# Patient Record
Sex: MALE | Race: WHITE | Employment: OTHER | URBAN - METROPOLITAN AREA
[De-identification: names, ages, dates, MRNs, and addresses within clinical notes are randomized per-mention and may not be internally consistent; named-entity substitution may affect disease eponyms.]

---

## 2017-02-10 RX ORDER — LORAZEPAM 0.5 MG/1
TABLET ORAL
Qty: 30 TABLET | Refills: 5 | OUTPATIENT
Start: 2017-02-10 | End: 2017-08-09

## 2017-02-10 NOTE — TELEPHONE ENCOUNTER
Noted. Patient has been on this medication for a while. Please call in a refill for this patient's medication as prescribed. It is prescribed as Ativan/lorazepam 0.5 mg  #30  Sig-1 tablet orally twice a day as necessary. 5 refills.   Will route this to

## 2017-03-01 ENCOUNTER — TELEPHONE (OUTPATIENT)
Dept: INTERNAL MEDICINE CLINIC | Facility: CLINIC | Age: 77
End: 2017-03-01

## 2017-03-01 ENCOUNTER — OFFICE VISIT (OUTPATIENT)
Dept: INTERNAL MEDICINE CLINIC | Facility: CLINIC | Age: 77
End: 2017-03-01

## 2017-03-01 VITALS
SYSTOLIC BLOOD PRESSURE: 110 MMHG | TEMPERATURE: 98 F | BODY MASS INDEX: 32.05 KG/M2 | HEART RATE: 64 BPM | DIASTOLIC BLOOD PRESSURE: 60 MMHG | WEIGHT: 201.81 LBS | HEIGHT: 66.5 IN | OXYGEN SATURATION: 96 %

## 2017-03-01 DIAGNOSIS — I10 ESSENTIAL HYPERTENSION: ICD-10-CM

## 2017-03-01 DIAGNOSIS — J40 BRONCHITIS: ICD-10-CM

## 2017-03-01 DIAGNOSIS — J01.90 ACUTE NON-RECURRENT SINUSITIS, UNSPECIFIED LOCATION: Primary | ICD-10-CM

## 2017-03-01 PROCEDURE — G0463 HOSPITAL OUTPT CLINIC VISIT: HCPCS | Performed by: INTERNAL MEDICINE

## 2017-03-01 PROCEDURE — 99213 OFFICE O/P EST LOW 20 MIN: CPT | Performed by: INTERNAL MEDICINE

## 2017-03-01 RX ORDER — AZITHROMYCIN 250 MG/1
TABLET, FILM COATED ORAL
Qty: 6 TABLET | Refills: 1 | Status: SHIPPED | OUTPATIENT
Start: 2017-03-01 | End: 2017-03-06

## 2017-03-01 NOTE — PROGRESS NOTES
Gunnar Solano is a 67year old male. Patient presents with:  Cough: Symptoms first started last Thursday. Cough with difficulty bringing up mucus, headaches, fatigue, felt like he had fevers a couple nights ago.  Started taking tylenol yesterday and OTC d Take 1 tablet (100 mg total) by mouth once daily.  Disp: 90 tablet Rfl: 3   Pantoprazole Sodium (PROTONIX) 40 MG Oral Tab EC Take 1 tablet (40 mg total) by mouth every morning before breakfast. Disp: 90 tablet Rfl: 3   tamsulosin HCl (FLOMAX) 0.4 MG Oral Ca S1S2, without murmur   GI:Protuberant, BS are present, no organomegaly or palpable masses  EXTREMITIES: no edema  NEURO: alert and oriented  ASSESSMENT AND PLAN:   There are no diagnoses linked to this encounter.     Acute non-recurrent sinusitis, unspecifi

## 2017-03-01 NOTE — PATIENT INSTRUCTIONS
1.  Patient is to continue his current diet, medications and activity. 2.  Patient is to push fluids. 3.  Patient to take Zithromax as prescribed. He will be given one refill on the prescription to use as necessary.   4.  Patient can use Robitussin or Mu

## 2017-03-01 NOTE — TELEPHONE ENCOUNTER
Pt stopped in office, not feeling well  Congestion for about a week, seems to be getting worse  No fever  Can RX be called in for pt? Does he need to be seen?   Tasked to Dr Clay Ritchie nurse

## 2017-03-06 ENCOUNTER — TELEPHONE (OUTPATIENT)
Dept: INTERNAL MEDICINE CLINIC | Facility: CLINIC | Age: 77
End: 2017-03-06

## 2017-03-06 NOTE — TELEPHONE ENCOUNTER
Discussed with patient. Patient still has much head congestion and drainage. I have recommended to the patient that it is okay for him to refill his Z-Binh. However he should also  Zyrtec and take 1 tablet daily.   I feel that a lot of his head con

## 2017-03-06 NOTE — TELEPHONE ENCOUNTER
Please advise - called patient who stated he finished a z-jm , still runny nose, very congested , cough , specially at night , sometimes yellowish , denies fever. He wants to know if he should start the second z-jm he has at home - to DR. TATUM

## 2017-03-10 RX ORDER — CELECOXIB 200 MG/1
200 CAPSULE ORAL DAILY PRN
Qty: 90 CAPSULE | Refills: 1 | Status: SHIPPED | OUTPATIENT
Start: 2017-03-10 | End: 2017-08-17

## 2017-03-14 ENCOUNTER — APPOINTMENT (OUTPATIENT)
Dept: LAB | Age: 77
End: 2017-03-14
Attending: INTERNAL MEDICINE
Payer: MEDICARE

## 2017-03-14 DIAGNOSIS — E78.1 HYPERTRIGLYCERIDEMIA: ICD-10-CM

## 2017-03-14 DIAGNOSIS — E11.9 TYPE 2 DIABETES MELLITUS WITHOUT COMPLICATION, WITHOUT LONG-TERM CURRENT USE OF INSULIN (HCC): ICD-10-CM

## 2017-03-14 DIAGNOSIS — E78.00 HYPERCHOLESTEREMIA: ICD-10-CM

## 2017-03-14 LAB
ALT SERPL-CCNC: 27 U/L (ref 17–63)
AST SERPL-CCNC: 25 U/L (ref 15–41)
CHOLEST SERPL-MCNC: 144 MG/DL (ref 110–200)
GLUCOSE SERPL-MCNC: 173 MG/DL (ref 70–99)
HBA1C MFR BLD: 7.5 % (ref 4–6)
HDLC SERPL-MCNC: 23 MG/DL
LDLC SERPL CALC-MCNC: 98 MG/DL (ref 0–99)
NONHDLC SERPL-MCNC: 121 MG/DL
TRIGL SERPL-MCNC: 116 MG/DL (ref 1–149)

## 2017-03-14 PROCEDURE — 83036 HEMOGLOBIN GLYCOSYLATED A1C: CPT

## 2017-03-14 PROCEDURE — 80061 LIPID PANEL: CPT

## 2017-03-14 PROCEDURE — 84450 TRANSFERASE (AST) (SGOT): CPT

## 2017-03-14 PROCEDURE — 84460 ALANINE AMINO (ALT) (SGPT): CPT

## 2017-03-14 PROCEDURE — 82947 ASSAY GLUCOSE BLOOD QUANT: CPT

## 2017-03-14 PROCEDURE — 36415 COLL VENOUS BLD VENIPUNCTURE: CPT

## 2017-03-20 ENCOUNTER — OFFICE VISIT (OUTPATIENT)
Dept: INTERNAL MEDICINE CLINIC | Facility: CLINIC | Age: 77
End: 2017-03-20

## 2017-03-20 VITALS
WEIGHT: 202 LBS | TEMPERATURE: 98 F | HEART RATE: 60 BPM | SYSTOLIC BLOOD PRESSURE: 100 MMHG | HEIGHT: 66.5 IN | DIASTOLIC BLOOD PRESSURE: 70 MMHG | BODY MASS INDEX: 32.08 KG/M2

## 2017-03-20 DIAGNOSIS — K21.9 GASTROESOPHAGEAL REFLUX DISEASE, ESOPHAGITIS PRESENCE NOT SPECIFIED: ICD-10-CM

## 2017-03-20 DIAGNOSIS — I25.10 ASHD (ARTERIOSCLEROTIC HEART DISEASE): Primary | ICD-10-CM

## 2017-03-20 DIAGNOSIS — K58.9 IRRITABLE BOWEL SYNDROME WITHOUT DIARRHEA: ICD-10-CM

## 2017-03-20 DIAGNOSIS — E11.9 TYPE 2 DIABETES MELLITUS WITHOUT COMPLICATION, WITHOUT LONG-TERM CURRENT USE OF INSULIN (HCC): ICD-10-CM

## 2017-03-20 DIAGNOSIS — E78.00 HYPERCHOLESTEREMIA: ICD-10-CM

## 2017-03-20 DIAGNOSIS — G62.9 PERIPHERAL POLYNEUROPATHY: ICD-10-CM

## 2017-03-20 DIAGNOSIS — N40.1 BENIGN NON-NODULAR PROSTATIC HYPERPLASIA WITH LOWER URINARY TRACT SYMPTOMS: ICD-10-CM

## 2017-03-20 DIAGNOSIS — E78.1 HYPERTRIGLYCERIDEMIA: ICD-10-CM

## 2017-03-20 DIAGNOSIS — R97.20 ELEVATED PSA: ICD-10-CM

## 2017-03-20 DIAGNOSIS — R53.83 FATIGUE, UNSPECIFIED TYPE: ICD-10-CM

## 2017-03-20 DIAGNOSIS — I10 ESSENTIAL HYPERTENSION: ICD-10-CM

## 2017-03-20 DIAGNOSIS — M15.9 PRIMARY OSTEOARTHRITIS INVOLVING MULTIPLE JOINTS: ICD-10-CM

## 2017-03-20 PROCEDURE — 99214 OFFICE O/P EST MOD 30 MIN: CPT | Performed by: INTERNAL MEDICINE

## 2017-03-20 PROCEDURE — G0463 HOSPITAL OUTPT CLINIC VISIT: HCPCS | Performed by: INTERNAL MEDICINE

## 2017-03-20 RX ORDER — GLIMEPIRIDE 2 MG/1
2 TABLET ORAL
Qty: 90 TABLET | Refills: 3 | Status: SHIPPED | OUTPATIENT
Start: 2017-03-20 | End: 2018-02-26

## 2017-03-20 NOTE — PATIENT INSTRUCTIONS
1.  Patient is to continue his current diet, medications and activity. 2.  I will start the patient on glimepiride 2 mg orally daily in addition to his metformin to help control his diabetes mellitus.   3.  I will plan to see the patient back in 3 months w

## 2017-03-20 NOTE — PROGRESS NOTES
Orelia Osgood is a 67year old male. Patient presents with:  Checkup: Patient is here today for a 3 month checkup. In general he has been feeling good lately.    Ashd  Hypertension  Diabetes  Hyperlipidemia  Arthritis    HPI:   Patient presents with:  Irais Acharya Disp:  Rfl:    Dutasteride (AVODART) 0.5 MG Oral Cap Take 0.5 mg by mouth daily. Disp:  Rfl:    aspirin (ASPIRIN ADULT LOW STRENGTH) 81 MG Oral Tab EC Take 1 tablet by mouth daily.  Disp:  Rfl:    Psyllium (METAMUCIL) 0.52 G Oral Cap Take 1 packet by mouth current diet, medications and activity. I will see him back in 3 months of blood tests which will include an FBS, hemoglobin A1c, lipid panel, AST and ALT. I will see the patient back sooner as necessary.     2. Essential hypertension  Doing well.  CPM.

## 2017-03-21 RX ORDER — ZOLPIDEM TARTRATE 10 MG/1
TABLET ORAL
Qty: 90 TABLET | Refills: 1 | OUTPATIENT
Start: 2017-03-21 | End: 2017-09-14

## 2017-03-21 NOTE — TELEPHONE ENCOUNTER
Noted.  Chart reviewed. Patient has been on this medication for the past few years. I have renewed the patient's prescription with 1 refill. Please call the prescription into the patient's pharmacy as requested. I will route this to nursing.   Thank you

## 2017-04-01 RX ORDER — PANTOPRAZOLE SODIUM 40 MG/1
TABLET, DELAYED RELEASE ORAL
Qty: 90 TABLET | Refills: 3 | Status: SHIPPED | OUTPATIENT
Start: 2017-04-01 | End: 2017-10-25 | Stop reason: ALTCHOICE

## 2017-04-19 RX ORDER — TAMSULOSIN HYDROCHLORIDE 0.4 MG/1
0.4 CAPSULE ORAL DAILY
Qty: 90 CAPSULE | Refills: 3 | Status: SHIPPED | OUTPATIENT
Start: 2017-04-19 | End: 2018-04-04

## 2017-04-19 RX ORDER — DUTASTERIDE 0.5 MG/1
0.5 CAPSULE, LIQUID FILLED ORAL DAILY
Qty: 90 CAPSULE | Refills: 3 | Status: SHIPPED | OUTPATIENT
Start: 2017-04-19 | End: 2018-04-04

## 2017-05-18 ENCOUNTER — OFFICE VISIT (OUTPATIENT)
Dept: OPHTHALMOLOGY | Facility: CLINIC | Age: 77
End: 2017-05-18

## 2017-05-18 DIAGNOSIS — H43.392 FLOATER, VITREOUS, LEFT: ICD-10-CM

## 2017-05-18 DIAGNOSIS — H25.13 AGE-RELATED NUCLEAR CATARACT OF BOTH EYES: ICD-10-CM

## 2017-05-18 DIAGNOSIS — E11.9 DIABETES MELLITUS TYPE 2 WITHOUT RETINOPATHY (HCC): Primary | ICD-10-CM

## 2017-05-18 PROBLEM — H43.399 FLOATER, VITREOUS: Status: ACTIVE | Noted: 2017-05-18

## 2017-05-18 PROCEDURE — 92004 COMPRE OPH EXAM NEW PT 1/>: CPT | Performed by: OPHTHALMOLOGY

## 2017-05-18 NOTE — PATIENT INSTRUCTIONS
Diabetes mellitus type 2 without retinopathy ([facility] [address]  Diabetes type II: no background of retinopathy, no signs of neovascularization noted. Discussed ocular and systemic benefits of blood sugar control.   Diagnosis and treatment discussed in detail with clive

## 2017-05-18 NOTE — ASSESSMENT & PLAN NOTE
Discussed diagnosis of cataracts in detail with patient. Cataract surgery not indicated at this time due to vision level. Will continue to monitor yearly. Patient will call sooner than 1 year recall if they notice a change in vision.

## 2017-05-18 NOTE — PROGRESS NOTES
Fausto Peyton is a 67year old male.     HPI:     HPI     Diabetic Eye Exam    Additional comments: Pt has been a diabetic for 12 years  12 years on pills/  0 years on Insulin   Pt does not check his BS at home  Last HA1C was 7.5 on 3/14/17           Com Rfl: 3   ZOLPIDEM TARTRATE 10 MG Oral Tab TAKE ONE TABLET BY MOUTH AT BEDTIME AS NEEDED Disp: 90 tablet Rfl: 1   glimepiride 2 MG Oral Tab Take 1 tablet (2 mg total) by mouth daily with breakfast. Disp: 90 tablet Rfl: 3   celecoxib 200 MG Oral Cap Take 1 c Dermatochalasis, Meibomian gland dysfunction Dermatochalasis, Meibomian gland dysfunction    Conjunctiva/Sclera Normal Normal    Cornea Clear Clear    Anterior Chamber Deep and quiet Deep and quiet    Iris Normal Normal    Lens 2+ Nuclear sclerosis 2+ Nucl (3) no apparent problem

## 2017-06-12 ENCOUNTER — PRIOR ORIGINAL RECORDS (OUTPATIENT)
Dept: OTHER | Age: 77
End: 2017-06-12

## 2017-06-12 ENCOUNTER — APPOINTMENT (OUTPATIENT)
Dept: LAB | Age: 77
End: 2017-06-12
Attending: INTERNAL MEDICINE
Payer: MEDICARE

## 2017-06-12 DIAGNOSIS — E78.00 HYPERCHOLESTEREMIA: ICD-10-CM

## 2017-06-12 DIAGNOSIS — E11.9 TYPE 2 DIABETES MELLITUS WITHOUT COMPLICATION, WITHOUT LONG-TERM CURRENT USE OF INSULIN (HCC): ICD-10-CM

## 2017-06-12 DIAGNOSIS — E78.1 HYPERTRIGLYCERIDEMIA: ICD-10-CM

## 2017-06-12 PROCEDURE — 83036 HEMOGLOBIN GLYCOSYLATED A1C: CPT

## 2017-06-12 PROCEDURE — 80061 LIPID PANEL: CPT

## 2017-06-12 PROCEDURE — 82947 ASSAY GLUCOSE BLOOD QUANT: CPT

## 2017-06-12 PROCEDURE — 36415 COLL VENOUS BLD VENIPUNCTURE: CPT

## 2017-06-12 PROCEDURE — 84460 ALANINE AMINO (ALT) (SGPT): CPT

## 2017-06-12 PROCEDURE — 84450 TRANSFERASE (AST) (SGOT): CPT

## 2017-06-20 LAB
ALT (SGPT): 28 U/L
AST (SGOT): 27 U/L
CHOLESTEROL, TOTAL: 132 MG/DL
GLUCOSE: 104 MG/DL
HDL CHOLESTEROL: 26 MG/DL
LDL CHOLESTEROL: 91 MG/DL
TRIGLYCERIDES: 73 MG/DL

## 2017-06-21 ENCOUNTER — PRIOR ORIGINAL RECORDS (OUTPATIENT)
Dept: OTHER | Age: 77
End: 2017-06-21

## 2017-06-22 ENCOUNTER — OFFICE VISIT (OUTPATIENT)
Dept: INTERNAL MEDICINE CLINIC | Facility: CLINIC | Age: 77
End: 2017-06-22

## 2017-06-22 VITALS
TEMPERATURE: 98 F | DIASTOLIC BLOOD PRESSURE: 70 MMHG | BODY MASS INDEX: 32.24 KG/M2 | WEIGHT: 203 LBS | SYSTOLIC BLOOD PRESSURE: 116 MMHG | HEART RATE: 64 BPM | HEIGHT: 66.5 IN

## 2017-06-22 DIAGNOSIS — I25.10 ASHD (ARTERIOSCLEROTIC HEART DISEASE): Primary | ICD-10-CM

## 2017-06-22 DIAGNOSIS — I10 ESSENTIAL HYPERTENSION: ICD-10-CM

## 2017-06-22 DIAGNOSIS — R53.83 FATIGUE, UNSPECIFIED TYPE: ICD-10-CM

## 2017-06-22 DIAGNOSIS — M15.9 PRIMARY OSTEOARTHRITIS INVOLVING MULTIPLE JOINTS: ICD-10-CM

## 2017-06-22 DIAGNOSIS — Z00.00 ANNUAL PHYSICAL EXAM: ICD-10-CM

## 2017-06-22 DIAGNOSIS — E78.1 HYPERTRIGLYCERIDEMIA: ICD-10-CM

## 2017-06-22 DIAGNOSIS — E11.9 TYPE 2 DIABETES MELLITUS WITHOUT COMPLICATION, WITHOUT LONG-TERM CURRENT USE OF INSULIN (HCC): ICD-10-CM

## 2017-06-22 DIAGNOSIS — N40.1 BENIGN NON-NODULAR PROSTATIC HYPERPLASIA WITH LOWER URINARY TRACT SYMPTOMS: ICD-10-CM

## 2017-06-22 DIAGNOSIS — G62.9 PERIPHERAL POLYNEUROPATHY: ICD-10-CM

## 2017-06-22 DIAGNOSIS — K21.9 GASTROESOPHAGEAL REFLUX DISEASE, ESOPHAGITIS PRESENCE NOT SPECIFIED: ICD-10-CM

## 2017-06-22 DIAGNOSIS — L98.9 SKIN LESION: ICD-10-CM

## 2017-06-22 DIAGNOSIS — K58.9 IRRITABLE BOWEL SYNDROME WITHOUT DIARRHEA: ICD-10-CM

## 2017-06-22 DIAGNOSIS — R97.20 ELEVATED PSA: ICD-10-CM

## 2017-06-22 DIAGNOSIS — E78.00 HYPERCHOLESTEREMIA: ICD-10-CM

## 2017-06-22 PROCEDURE — 90471 IMMUNIZATION ADMIN: CPT | Performed by: INTERNAL MEDICINE

## 2017-06-22 PROCEDURE — 99214 OFFICE O/P EST MOD 30 MIN: CPT | Performed by: INTERNAL MEDICINE

## 2017-06-22 PROCEDURE — G0463 HOSPITAL OUTPT CLINIC VISIT: HCPCS | Performed by: INTERNAL MEDICINE

## 2017-06-22 PROCEDURE — 90715 TDAP VACCINE 7 YRS/> IM: CPT | Performed by: INTERNAL MEDICINE

## 2017-06-22 NOTE — PROGRESS NOTES
Ricka Collet is a 67year old male. Patient presents with:  Checkup: 3 months  Ashd  Hypertension  Diabetes  Hyperlipidemia  Arthritis    HPI:   Patient presents with:  Checkup: 3 months  Ashd  Hypertension  Diabetes  Hyperlipidemia  Arthritis    Pt fee Tablet 24 Hr TAKE 1 TABLET DAILY Disp: 90 tablet Rfl: 3   AMLODIPINE BESYLATE 10 MG Oral Tab TAKE 1 TABLET DAILY Disp: 90 tablet Rfl: 3   SPIRONOLACTONE 25 MG Oral Tab TAKE 1 TABLET DAILY Disp: 90 tablet Rfl: 3   Losartan Potassium 100 MG Oral Tab Take 1 t Patient has some small sores over his lower legs which appear clean but which have not healed. NEURO: alert and oriented  ASSESSMENT AND PLAN:   1. ASHD (arteriosclerotic heart disease)  Doing well.  CPM.  Patient to continue to be active.   He is going to the plan. The patient is asked to return in 3 months with blood tests and urinalysis for his annual physical examination. Vicky Linder MD  6/22/2017  10:22 AM

## 2017-06-22 NOTE — PATIENT INSTRUCTIONS
1.  Patient is to continue his current diet, medications and activity. 2.  I will plan to see the patient back in about 3 months with blood tests, urinalysis and EKG for his annual physical examination. .  I will see the patient back sooner as necessary.

## 2017-06-26 RX ORDER — LOSARTAN POTASSIUM 100 MG/1
TABLET ORAL
Qty: 90 TABLET | Refills: 3 | Status: SHIPPED | OUTPATIENT
Start: 2017-06-26 | End: 2017-11-20

## 2017-07-05 ENCOUNTER — TELEPHONE (OUTPATIENT)
Dept: INTERNAL MEDICINE CLINIC | Facility: CLINIC | Age: 77
End: 2017-07-05

## 2017-07-05 NOTE — TELEPHONE ENCOUNTER
PA needed for RX Celecoxib  Per [facility] PA is about to   Ph [phone]  No ID given  To Rx and in purple

## 2017-08-10 RX ORDER — LORAZEPAM 0.5 MG/1
TABLET ORAL
Qty: 30 TABLET | Refills: 5 | OUTPATIENT
Start: 2017-08-10 | End: 2017-11-20 | Stop reason: CLARIF

## 2017-08-10 NOTE — TELEPHONE ENCOUNTER
Noted.  I have approved the medication refill as requested. I have also given the patient 5 additional refills. Please call in this prescription and refills to the patient's pharmacy as requested. Also notify the patient this is been done.   I will route

## 2017-08-18 RX ORDER — CELECOXIB 200 MG/1
CAPSULE ORAL
Qty: 90 CAPSULE | Refills: 3 | Status: SHIPPED | OUTPATIENT
Start: 2017-08-18 | End: 2017-11-20 | Stop reason: DRUGHIGH

## 2017-09-05 ENCOUNTER — TELEPHONE (OUTPATIENT)
Dept: INTERNAL MEDICINE CLINIC | Facility: CLINIC | Age: 77
End: 2017-09-05

## 2017-09-05 ENCOUNTER — HOSPITAL ENCOUNTER (OUTPATIENT)
Dept: ULTRASOUND IMAGING | Facility: HOSPITAL | Age: 77
Discharge: HOME | End: 2017-09-05
Attending: INTERNAL MEDICINE | Admitting: INTERNAL MEDICINE
Payer: MEDICARE

## 2017-09-05 ENCOUNTER — OFFICE VISIT (OUTPATIENT)
Dept: INTERNAL MEDICINE CLINIC | Facility: CLINIC | Age: 77
End: 2017-09-05

## 2017-09-05 VITALS
DIASTOLIC BLOOD PRESSURE: 58 MMHG | TEMPERATURE: 98 F | OXYGEN SATURATION: 98 % | HEART RATE: 63 BPM | WEIGHT: 206.38 LBS | BODY MASS INDEX: 32.78 KG/M2 | SYSTOLIC BLOOD PRESSURE: 120 MMHG | HEIGHT: 66.5 IN

## 2017-09-05 DIAGNOSIS — M79.662 PAIN AND SWELLING OF LEFT LOWER LEG: Primary | ICD-10-CM

## 2017-09-05 DIAGNOSIS — I25.10 ASHD (ARTERIOSCLEROTIC HEART DISEASE): ICD-10-CM

## 2017-09-05 DIAGNOSIS — M79.89 PAIN AND SWELLING OF LEFT LOWER LEG: ICD-10-CM

## 2017-09-05 DIAGNOSIS — E11.9 TYPE 2 DIABETES MELLITUS WITHOUT COMPLICATION, WITHOUT LONG-TERM CURRENT USE OF INSULIN (HCC): ICD-10-CM

## 2017-09-05 DIAGNOSIS — I73.9 CLAUDICATION (HCC): Primary | ICD-10-CM

## 2017-09-05 DIAGNOSIS — I10 ESSENTIAL HYPERTENSION: ICD-10-CM

## 2017-09-05 DIAGNOSIS — M79.89 PAIN AND SWELLING OF LEFT LOWER LEG: Primary | ICD-10-CM

## 2017-09-05 DIAGNOSIS — M79.662 PAIN AND SWELLING OF LEFT LOWER LEG: ICD-10-CM

## 2017-09-05 PROCEDURE — G0463 HOSPITAL OUTPT CLINIC VISIT: HCPCS | Performed by: INTERNAL MEDICINE

## 2017-09-05 PROCEDURE — 93971 EXTREMITY STUDY: CPT | Performed by: INTERNAL MEDICINE

## 2017-09-05 PROCEDURE — 99214 OFFICE O/P EST MOD 30 MIN: CPT | Performed by: INTERNAL MEDICINE

## 2017-09-05 NOTE — PROGRESS NOTES
Sravan Magaña is a 65year old male   HPI:   Pt.presents for the following problems. For about 6 weeks patient has been having a painful left calf. No pain at rest but patient takes a few steps and he has sharp shooting pains left calf area.   He is TABLET EVERY MORNINGBEFORE BREAKFAST Disp: 90 tablet Rfl: 3   ZOLPIDEM TARTRATE 10 MG Oral Tab TAKE ONE TABLET BY MOUTH AT BEDTIME AS NEEDED Disp: 90 tablet Rfl: 1   glimepiride 2 MG Oral Tab Take 1 tablet (2 mg total) by mouth daily with breakfast. Disp: breath or cough  CARDIOVASCULAR :  denies chest pain or palpitations  GI:  denies abdominal pain, blood in stool or changes in bowel movements.   : denies blood in urine or changes in stream  MUSCULOSKELETAL: Patient does not have any acute knee pain or a dorsal pedal pulse. - US VENOUS DOPPLER LEG LEFT - DIAG IMG ([id number]); Future    2. Essential hypertension  Blood pressure under adequate control.     3. Type 2 diabetes mellitus without complication, without long-term current use of insulin ([facility] [address]  Azul

## 2017-09-05 NOTE — TELEPHONE ENCOUNTER
Please let patient know that his venous Doppler did not show any blood clot. He already got this information from ultrasound. I did place an order for his artery test that he can call and schedule.   This will make sure that his circulation is adequate go

## 2017-09-05 NOTE — TELEPHONE ENCOUNTER
Relayed MD message to patient. Phone number given for [employer]. Patient would like to know If he should continue to exersize or hold off.

## 2017-09-05 NOTE — TELEPHONE ENCOUNTER
Please notify patient I would hold off for now not knowing the exact reason for the pain. I am hoping that we will not find any narrowing of his arteries since that usually gives more of the ache in the calf rather than the sharp pain he is experiencing.

## 2017-09-05 NOTE — TELEPHONE ENCOUNTER
Called patient , spoke with spouse and relayed DR. CELIS message - she verbalized understanding and will relay message to her spouse

## 2017-09-08 ENCOUNTER — TELEPHONE (OUTPATIENT)
Dept: INTERNAL MEDICINE CLINIC | Facility: CLINIC | Age: 77
End: 2017-09-08

## 2017-09-08 ENCOUNTER — HOSPITAL ENCOUNTER (OUTPATIENT)
Dept: ULTRASOUND IMAGING | Facility: HOSPITAL | Age: 77
Discharge: HOME | End: 2017-09-08
Attending: INTERNAL MEDICINE
Payer: MEDICARE

## 2017-09-08 DIAGNOSIS — I73.9 CLAUDICATION (HCC): ICD-10-CM

## 2017-09-08 PROCEDURE — 93924 LWR XTR VASC STDY BILAT: CPT | Performed by: INTERNAL MEDICINE

## 2017-09-08 NOTE — TELEPHONE ENCOUNTER
Please notify patient that his peripheral arterial study is good. No evidence of peripheral arterial disease or circulation problems either in the veins or arteries. He can follow-up with Dr. Harpal Salazar.   Not quite sure of the pain he has in his calf with

## 2017-09-14 NOTE — TELEPHONE ENCOUNTER
Dr. Adam Boswell message relayed to pt who verbalized understanding. Pt states pain is getting better, every day better and better.   To Dr. Angelica Hart, [facility]

## 2017-09-15 RX ORDER — ZOLPIDEM TARTRATE 10 MG/1
TABLET ORAL
Qty: 90 TABLET | Refills: 1 | Status: SHIPPED
Start: 2017-09-15 | End: 2017-11-20 | Stop reason: CLARIF

## 2017-09-15 NOTE — TELEPHONE ENCOUNTER
To  (on call) to please advise in 's absence: The above refill request is for a controlled substance. Please indicate yes or no to refill 30 days supply plus one refill.   If more refills are appropriate, please indicate quantity

## 2017-09-19 ENCOUNTER — LAB ENCOUNTER (OUTPATIENT)
Dept: LAB | Age: 77
End: 2017-09-19
Attending: INTERNAL MEDICINE
Payer: MEDICARE

## 2017-09-19 ENCOUNTER — TELEPHONE (OUTPATIENT)
Dept: INTERNAL MEDICINE CLINIC | Facility: CLINIC | Age: 77
End: 2017-09-19

## 2017-09-19 ENCOUNTER — PRIOR ORIGINAL RECORDS (OUTPATIENT)
Dept: OTHER | Age: 77
End: 2017-09-19

## 2017-09-19 DIAGNOSIS — R53.83 FATIGUE, UNSPECIFIED TYPE: ICD-10-CM

## 2017-09-19 DIAGNOSIS — R97.20 ELEVATED PSA: ICD-10-CM

## 2017-09-19 DIAGNOSIS — E11.9 TYPE 2 DIABETES MELLITUS WITHOUT COMPLICATION, WITHOUT LONG-TERM CURRENT USE OF INSULIN (HCC): ICD-10-CM

## 2017-09-19 DIAGNOSIS — E78.00 HYPERCHOLESTEREMIA: ICD-10-CM

## 2017-09-19 DIAGNOSIS — Z00.00 ANNUAL PHYSICAL EXAM: ICD-10-CM

## 2017-09-19 LAB
ALBUMIN SERPL BCP-MCNC: 4.5 G/DL (ref 3.5–4.8)
ALBUMIN/GLOB SERPL: 1.8 {RATIO} (ref 1–2)
ALP SERPL-CCNC: 42 U/L (ref 32–100)
ALT SERPL-CCNC: 31 U/L (ref 17–63)
ANION GAP SERPL CALC-SCNC: 8 MMOL/L (ref 0–18)
AST SERPL-CCNC: 29 U/L (ref 15–41)
BACTERIA UR QL AUTO: NEGATIVE /HPF
BASOPHILS # BLD: 0 K/UL (ref 0–0.2)
BASOPHILS NFR BLD: 0 %
BILIRUB SERPL-MCNC: 0.9 MG/DL (ref 0.3–1.2)
BILIRUB UR QL: NEGATIVE
BUN SERPL-MCNC: 17 MG/DL (ref 8–20)
BUN/CREAT SERPL: 14.3 (ref 10–20)
CALCIUM SERPL-MCNC: 9.5 MG/DL (ref 8.5–10.5)
CHLORIDE SERPL-SCNC: 98 MMOL/L (ref 95–110)
CHOLEST SERPL-MCNC: 132 MG/DL (ref 110–200)
CLARITY UR: CLEAR
CO2 SERPL-SCNC: 25 MMOL/L (ref 22–32)
COLOR UR: YELLOW
CREAT SERPL-MCNC: 1.19 MG/DL (ref 0.5–1.5)
EOSINOPHIL # BLD: 0.2 K/UL (ref 0–0.7)
EOSINOPHIL NFR BLD: 2 %
ERYTHROCYTE [DISTWIDTH] IN BLOOD BY AUTOMATED COUNT: 13.3 % (ref 11–15)
GLOBULIN PLAS-MCNC: 2.5 G/DL (ref 2.5–3.7)
GLUCOSE SERPL-MCNC: 125 MG/DL (ref 70–99)
GLUCOSE UR-MCNC: NEGATIVE MG/DL
HBA1C MFR BLD: 6.4 % (ref 4–6)
HCT VFR BLD AUTO: 44.4 % (ref 41–52)
HDLC SERPL-MCNC: 29 MG/DL
HGB BLD-MCNC: 15.1 G/DL (ref 13.5–17.5)
HGB UR QL STRIP.AUTO: NEGATIVE
KETONES UR-MCNC: NEGATIVE MG/DL
LDLC SERPL CALC-MCNC: 83 MG/DL (ref 0–99)
LEUKOCYTE ESTERASE UR QL STRIP.AUTO: NEGATIVE
LYMPHOCYTES # BLD: 1.9 K/UL (ref 1–4)
LYMPHOCYTES NFR BLD: 25 %
MCH RBC QN AUTO: 31.1 PG (ref 27–32)
MCHC RBC AUTO-ENTMCNC: 34 G/DL (ref 32–37)
MCV RBC AUTO: 91.6 FL (ref 80–100)
MONOCYTES # BLD: 0.6 K/UL (ref 0–1)
MONOCYTES NFR BLD: 8 %
NEUTROPHILS # BLD AUTO: 5.1 K/UL (ref 1.8–7.7)
NEUTROPHILS NFR BLD: 65 %
NITRITE UR QL STRIP.AUTO: NEGATIVE
NONHDLC SERPL-MCNC: 103 MG/DL
OSMOLALITY UR CALC.SUM OF ELEC: 275 MOSM/KG (ref 275–295)
PH UR: 7 [PH] (ref 5–8)
PLATELET # BLD AUTO: 174 K/UL (ref 140–400)
PMV BLD AUTO: 9.6 FL (ref 7.4–10.3)
POTASSIUM SERPL-SCNC: 5.2 MMOL/L (ref 3.3–5.1)
PROT SERPL-MCNC: 7 G/DL (ref 5.9–8.4)
PROT UR-MCNC: NEGATIVE MG/DL
PSA SERPL-MCNC: 4 NG/ML (ref 0–4)
RBC # BLD AUTO: 4.85 M/UL (ref 4.5–5.9)
RBC #/AREA URNS AUTO: 0 /HPF
SODIUM SERPL-SCNC: 131 MMOL/L (ref 136–144)
SP GR UR STRIP: 1.01 (ref 1–1.03)
TRIGL SERPL-MCNC: 101 MG/DL (ref 1–149)
TSH SERPL-ACNC: 1.56 UIU/ML (ref 0.45–5.33)
UROBILINOGEN UR STRIP-ACNC: <2
VIT C UR-MCNC: 20 MG/DL
WBC # BLD AUTO: 7.8 K/UL (ref 4–11)
WBC #/AREA URNS AUTO: <1 /HPF

## 2017-09-19 PROCEDURE — 83036 HEMOGLOBIN GLYCOSYLATED A1C: CPT

## 2017-09-19 PROCEDURE — 84443 ASSAY THYROID STIM HORMONE: CPT

## 2017-09-19 PROCEDURE — 81003 URINALYSIS AUTO W/O SCOPE: CPT

## 2017-09-19 PROCEDURE — 80053 COMPREHEN METABOLIC PANEL: CPT

## 2017-09-19 PROCEDURE — 85025 COMPLETE CBC W/AUTO DIFF WBC: CPT

## 2017-09-19 PROCEDURE — 80061 LIPID PANEL: CPT

## 2017-09-19 PROCEDURE — 36415 COLL VENOUS BLD VENIPUNCTURE: CPT

## 2017-09-19 NOTE — TELEPHONE ENCOUNTER
I reviewed 9/19/17 labs–CBC CMP TSH lipid PSA A1c UA–results can wait for Dr. Edilma Marcano. This message routed to Dr. France Files.

## 2017-09-27 NOTE — TELEPHONE ENCOUNTER
Pt has OV tomorrow and last K+ level was 5.2 - to DR. TATUM to review/change at [address] [facility]

## 2017-09-28 ENCOUNTER — OFFICE VISIT (OUTPATIENT)
Dept: INTERNAL MEDICINE CLINIC | Facility: CLINIC | Age: 77
End: 2017-09-28

## 2017-09-28 VITALS
BODY MASS INDEX: 33.03 KG/M2 | TEMPERATURE: 98 F | OXYGEN SATURATION: 96 % | WEIGHT: 208 LBS | HEART RATE: 56 BPM | DIASTOLIC BLOOD PRESSURE: 70 MMHG | HEIGHT: 66.5 IN | SYSTOLIC BLOOD PRESSURE: 124 MMHG

## 2017-09-28 DIAGNOSIS — K21.9 GASTROESOPHAGEAL REFLUX DISEASE, ESOPHAGITIS PRESENCE NOT SPECIFIED: ICD-10-CM

## 2017-09-28 DIAGNOSIS — M15.9 PRIMARY OSTEOARTHRITIS INVOLVING MULTIPLE JOINTS: ICD-10-CM

## 2017-09-28 DIAGNOSIS — I10 ESSENTIAL HYPERTENSION: ICD-10-CM

## 2017-09-28 DIAGNOSIS — G62.9 PERIPHERAL POLYNEUROPATHY: ICD-10-CM

## 2017-09-28 DIAGNOSIS — I25.10 ASHD (ARTERIOSCLEROTIC HEART DISEASE): ICD-10-CM

## 2017-09-28 DIAGNOSIS — E78.00 HYPERCHOLESTEREMIA: ICD-10-CM

## 2017-09-28 DIAGNOSIS — Z00.00 ANNUAL PHYSICAL EXAM: Primary | ICD-10-CM

## 2017-09-28 DIAGNOSIS — K58.9 IRRITABLE BOWEL SYNDROME WITHOUT DIARRHEA: ICD-10-CM

## 2017-09-28 DIAGNOSIS — R97.20 ELEVATED PSA: ICD-10-CM

## 2017-09-28 DIAGNOSIS — E78.1 HYPERTRIGLYCERIDEMIA: ICD-10-CM

## 2017-09-28 DIAGNOSIS — R53.83 FATIGUE, UNSPECIFIED TYPE: ICD-10-CM

## 2017-09-28 DIAGNOSIS — N40.1 BENIGN NON-NODULAR PROSTATIC HYPERPLASIA WITH LOWER URINARY TRACT SYMPTOMS: ICD-10-CM

## 2017-09-28 DIAGNOSIS — R94.31 ABNORMAL EKG: ICD-10-CM

## 2017-09-28 DIAGNOSIS — I34.0 MITRAL VALVE INSUFFICIENCY, UNSPECIFIED ETIOLOGY: ICD-10-CM

## 2017-09-28 DIAGNOSIS — E11.9 TYPE 2 DIABETES MELLITUS WITHOUT COMPLICATION, WITHOUT LONG-TERM CURRENT USE OF INSULIN (HCC): ICD-10-CM

## 2017-09-28 DIAGNOSIS — L72.9 CYST OF SKIN: ICD-10-CM

## 2017-09-28 LAB
OCCULT BLOOD, STOOL 1: NEGATIVE
PERFORMANCE MONITORS CORRECT (YES/NO): YES YES/NO

## 2017-09-28 PROCEDURE — 93005 ELECTROCARDIOGRAM TRACING: CPT | Performed by: INTERNAL MEDICINE

## 2017-09-28 PROCEDURE — 99214 OFFICE O/P EST MOD 30 MIN: CPT | Performed by: INTERNAL MEDICINE

## 2017-09-28 PROCEDURE — 82272 OCCULT BLD FECES 1-3 TESTS: CPT | Performed by: INTERNAL MEDICINE

## 2017-09-28 PROCEDURE — G0439 PPPS, SUBSEQ VISIT: HCPCS | Performed by: INTERNAL MEDICINE

## 2017-09-28 PROCEDURE — 93010 ELECTROCARDIOGRAM REPORT: CPT | Performed by: INTERNAL MEDICINE

## 2017-09-28 NOTE — PATIENT INSTRUCTIONS
1.  Patient's continue his current diet, medications and activity. 2.  I will give the patient order to have a 2D echocardiogram to evaluate his heart murmur. 3.  Patient to see Dr. Hayden Palacio concerning the skin lesion on his right lower leg.   4.  Azul

## 2017-09-29 NOTE — PROGRESS NOTES
Loan Gonsalez is a 65year old male who presents for a complete physical exam.   HPI:   Mr. Aubrey Wilkinson is a 68-MGMM-HDV white male who was seen by me on September 28, 2017 for his Medicare annual physical examination.   At the time of the examination kg)  12/12/16 : 200 lb (90.7 kg)    Body mass index is 33.07 kg/m².        Current Outpatient Prescriptions:  ZOLPIDEM TARTRATE 10 MG Oral Tab TAKE ONE TABLET BY MOUTH AT BEDTIME AS NEEDED Disp: 90 tablet Rfl: 1   METFORMIN HCL 1000 MG Oral Tab TAKE 1 TABLE uncontrolled    • Unspecified essential hypertension 1988   • Vitreous floaters of both eyes 2014      Past Surgical History:  2011: ANGIOPLASTY (CORONARY)      Comment: + stent(s)   Family History   Problem Relation Age of Onset   • Heart Disease Father groin region. The area is not painful or red. RECTAL:  Stool is brown and is negative for Occult blood. Prostate is normal with no palpable nodules  MUSCULOSKELETAL: back is not tender. No pain or swelling of legs  EXTREMITIES:No edema.   All peripheral current medications. He is to continue his current diet and activity also. 4. Type 2 diabetes mellitus without complication, without long-term current use of insulin ([facility] [address]  Patient's diabetes mellitus is doing fairly well.   His recent FBS was noted to patient obtain a 2D echocardiogram.  I will plan to the patient follow-up with me in 1 month. - CARD ECHO 2D DOPPLER (CPT=93306); Future    16. Cyst of skin  Patient is a cyst just lateral to the scrotum in the right groin region.   It appears to be a cy 0-No    Do you have any difficulty walking or getting up?: 0-No    Do you have any tripping hazards?: 0-No    Are you on multiple medications?: 1-Yes    Does pain affect your day to day activities?: 0-No     Have you had any memory issues?: 0-No    Fall/Ri Value   09/19/2017 83   08/01/2016 109 (H)        EKG One Time Pt had his EKG today. Colorectal Cancer Screening      Colonoscopy Screen every 10 years There are no preventive care reminders to display for this patient.  Update Health Maintenance if appl No flowsheet data found. Diabetes      HgbA1C  Annually GLYCOHEMOGLOBIN (HgA1c) (L) (%)   Date Value   08/01/2016 7.7 (H)     Glycohemoglobin (HgA1c) (%)   Date Value   09/19/2017 6.4 (H)    No flowsheet data found.     Creat/alb ratio  Annually      LDL

## 2017-09-30 RX ORDER — SPIRONOLACTONE 25 MG/1
TABLET ORAL
Qty: 90 TABLET | Refills: 3 | Status: SHIPPED | OUTPATIENT
Start: 2017-09-30 | End: 2017-11-20 | Stop reason: CLARIF

## 2017-10-01 NOTE — TELEPHONE ENCOUNTER
NOted.  I have reviewed pt's chart, lab tests and meds. I have renewed pt's med as requested.   Thank you!!

## 2017-10-09 RX ORDER — AMLODIPINE BESYLATE 10 MG/1
TABLET ORAL
Qty: 90 TABLET | Refills: 3 | Status: SHIPPED | OUTPATIENT
Start: 2017-10-09 | End: 2017-11-20 | Stop reason: CLARIF

## 2017-10-15 RX ORDER — METOPROLOL SUCCINATE 100 MG/1
TABLET, EXTENDED RELEASE ORAL
Qty: 90 TABLET | Refills: 3 | Status: SHIPPED | OUTPATIENT
Start: 2017-10-15 | End: 2017-11-20

## 2017-10-17 ENCOUNTER — HOSPITAL ENCOUNTER (OUTPATIENT)
Dept: CV DIAGNOSTICS | Facility: HOSPITAL | Age: 77
Discharge: HOME | End: 2017-10-17
Attending: INTERNAL MEDICINE
Payer: MEDICARE

## 2017-10-17 DIAGNOSIS — I34.0 MITRAL VALVE INSUFFICIENCY, UNSPECIFIED ETIOLOGY: ICD-10-CM

## 2017-10-17 PROCEDURE — 93306 TTE W/DOPPLER COMPLETE: CPT | Performed by: INTERNAL MEDICINE

## 2017-10-25 ENCOUNTER — OFFICE VISIT (OUTPATIENT)
Dept: INTERNAL MEDICINE CLINIC | Facility: CLINIC | Age: 77
End: 2017-10-25

## 2017-10-25 VITALS
HEART RATE: 60 BPM | OXYGEN SATURATION: 97 % | WEIGHT: 206 LBS | SYSTOLIC BLOOD PRESSURE: 100 MMHG | DIASTOLIC BLOOD PRESSURE: 60 MMHG | TEMPERATURE: 98 F | BODY MASS INDEX: 32.72 KG/M2 | HEIGHT: 66.5 IN

## 2017-10-25 DIAGNOSIS — I10 ESSENTIAL HYPERTENSION: ICD-10-CM

## 2017-10-25 DIAGNOSIS — I25.10 ASHD (ARTERIOSCLEROTIC HEART DISEASE): Primary | ICD-10-CM

## 2017-10-25 DIAGNOSIS — E11.9 TYPE 2 DIABETES MELLITUS WITHOUT COMPLICATION, WITHOUT LONG-TERM CURRENT USE OF INSULIN (HCC): ICD-10-CM

## 2017-10-25 DIAGNOSIS — E78.00 HYPERCHOLESTEROLEMIA: ICD-10-CM

## 2017-10-25 DIAGNOSIS — E78.1 HYPERTRIGLYCERIDEMIA: ICD-10-CM

## 2017-10-25 DIAGNOSIS — I34.0 MITRAL VALVE INSUFFICIENCY, UNSPECIFIED ETIOLOGY: ICD-10-CM

## 2017-10-25 PROCEDURE — G0463 HOSPITAL OUTPT CLINIC VISIT: HCPCS | Performed by: INTERNAL MEDICINE

## 2017-10-25 PROCEDURE — 99214 OFFICE O/P EST MOD 30 MIN: CPT | Performed by: INTERNAL MEDICINE

## 2017-10-25 RX ORDER — NICOTINE POLACRILEX 4 MG/1
20 GUM, CHEWING ORAL
Qty: 90 TABLET | Refills: 3 | Status: ON HOLD | OUTPATIENT
Start: 2017-10-25 | End: 2017-11-21 | Stop reason: CLARIF

## 2017-10-25 NOTE — PROGRESS NOTES
Dai Ware is a 65year old male. Patient presents with:  Checkup: 1 mo f/u  Ashd  Hypertension  Heart Murmur    HPI:   Patient presents with:  Checkup: 1 mo f/u  Ashd  Hypertension  Heart Murmur    Patient feels well.   Patient notes that he still get mouth daily.  Disp:  Rfl:       Past Medical History:   Diagnosis Date   • Allergic rhinitis    • Cataract of both eyes 2014   • Cholelithiasis    • Depression    • Elevated PSA    • Hemorrhoids    • High triglycerides    • IBS (irritable bowel syndrome) unspecified etiology  Patient had a 2D echocardiogram which is shown him to have moderate mitral insufficiency. Patient has been complaining of fatigue and shortness of breath. He has had no complaints of chest pain.   He notes the fatigue is caused him t

## 2017-10-25 NOTE — PATIENT INSTRUCTIONS
1.  Patient is to continue his current diet, medications and activity. 2.  I will refer the patient see Dr. Viry Parkinson, patient's cardiologist, for evaluation of his mitral insufficiency.   3.  I will plan to see the patient back in 3 months with blood evelyn

## 2017-11-07 ENCOUNTER — PRIOR ORIGINAL RECORDS (OUTPATIENT)
Dept: OTHER | Age: 77
End: 2017-11-07

## 2017-11-09 ENCOUNTER — LAB ENCOUNTER (OUTPATIENT)
Dept: LAB | Age: 77
End: 2017-11-09
Attending: INTERNAL MEDICINE
Payer: MEDICARE

## 2017-11-09 ENCOUNTER — PRIOR ORIGINAL RECORDS (OUTPATIENT)
Dept: OTHER | Age: 77
End: 2017-11-09

## 2017-11-09 ENCOUNTER — HOSPITAL ENCOUNTER (OUTPATIENT)
Dept: GENERAL RADIOLOGY | Age: 77
Discharge: HOME | End: 2017-11-09
Attending: INTERNAL MEDICINE
Payer: MEDICARE

## 2017-11-09 DIAGNOSIS — R07.9 CHEST PAIN: ICD-10-CM

## 2017-11-09 DIAGNOSIS — I25.10 CAD (CORONARY ARTERY DISEASE): Primary | ICD-10-CM

## 2017-11-09 PROCEDURE — 71020 XR CHEST PA + LAT CHEST (CPT=71020): CPT | Performed by: INTERNAL MEDICINE

## 2017-11-09 PROCEDURE — 80048 BASIC METABOLIC PNL TOTAL CA: CPT

## 2017-11-09 PROCEDURE — 36415 COLL VENOUS BLD VENIPUNCTURE: CPT

## 2017-11-09 PROCEDURE — 85025 COMPLETE CBC W/AUTO DIFF WBC: CPT

## 2017-11-15 ENCOUNTER — TELEPHONE (OUTPATIENT)
Dept: INTERNAL MEDICINE CLINIC | Facility: CLINIC | Age: 77
End: 2017-11-15

## 2017-11-15 NOTE — TELEPHONE ENCOUNTER
Pt stopped in office, he rec'd letter from [facility]  Pantoprazole will not be covered in 2018 under pt plan. Pt changed to Pantoprazole when he was told Omeprazole would no longer be covered.   Pt is NOT short on medication, this is just an Larry Platt  Tasked

## 2017-11-15 NOTE — TELEPHONE ENCOUNTER
Pt stopped in office  FYI for Dr Steph Beebe, pt is scheduled for angioplasty on 11/21/17 with Dr Bella Pandey  Tasked to Dr Steph Beebe

## 2017-11-16 NOTE — TELEPHONE ENCOUNTER
Telephone call to patient. Discussed with patient. I told him that I anticipate that if the pantoprazole is not covered that omeprazole will be covered. Patient insists that omeprazole will not be covered.   Patient will drop me off a copy of the letter

## 2017-11-17 RX ORDER — PRAVASTATIN SODIUM 40 MG
TABLET ORAL
Qty: 90 TABLET | Refills: 3 | Status: SHIPPED | OUTPATIENT
Start: 2017-11-17 | End: 2017-11-20 | Stop reason: CLARIF

## 2017-11-20 RX ORDER — CELECOXIB 200 MG/1
200 CAPSULE ORAL DAILY
COMMUNITY
End: 2018-09-09

## 2017-11-20 RX ORDER — SODIUM CHLORIDE 9 MG/ML
INJECTION, SOLUTION INTRAVENOUS ONCE
Status: COMPLETED | OUTPATIENT
Start: 2017-11-21 | End: 2017-11-21

## 2017-11-20 RX ORDER — PANTOPRAZOLE SODIUM 40 MG/1
40 TABLET, DELAYED RELEASE ORAL
COMMUNITY
End: 2019-11-22 | Stop reason: ALTCHOICE

## 2017-11-20 RX ORDER — AMLODIPINE BESYLATE 10 MG/1
10 TABLET ORAL DAILY
COMMUNITY
End: 2018-11-08

## 2017-11-20 RX ORDER — LORAZEPAM 0.5 MG/1
0.5 TABLET ORAL 2 TIMES DAILY PRN
COMMUNITY
End: 2018-02-07

## 2017-11-20 RX ORDER — SPIRONOLACTONE 25 MG/1
25 TABLET ORAL DAILY
COMMUNITY
End: 2018-11-08

## 2017-11-20 RX ORDER — METOPROLOL SUCCINATE 100 MG/1
100 TABLET, EXTENDED RELEASE ORAL DAILY
COMMUNITY
End: 2018-10-31

## 2017-11-20 RX ORDER — LOSARTAN POTASSIUM 100 MG/1
100 TABLET ORAL DAILY
COMMUNITY
End: 2018-07-09

## 2017-11-20 RX ORDER — ZOLPIDEM TARTRATE 10 MG/1
10 TABLET ORAL NIGHTLY PRN
COMMUNITY
End: 2018-03-12

## 2017-11-20 RX ORDER — PRAVASTATIN SODIUM 20 MG
20 TABLET ORAL NIGHTLY
COMMUNITY
End: 2018-08-08 | Stop reason: DRUGHIGH

## 2017-11-21 ENCOUNTER — HOSPITAL ENCOUNTER (OUTPATIENT)
Dept: INTERVENTIONAL RADIOLOGY/VASCULAR | Facility: HOSPITAL | Age: 77
Discharge: HOME | End: 2017-11-21
Attending: INTERNAL MEDICINE | Admitting: INTERNAL MEDICINE
Payer: MEDICARE

## 2017-11-21 VITALS
HEART RATE: 69 BPM | OXYGEN SATURATION: 95 % | RESPIRATION RATE: 20 BRPM | WEIGHT: 205 LBS | DIASTOLIC BLOOD PRESSURE: 68 MMHG | BODY MASS INDEX: 33 KG/M2 | SYSTOLIC BLOOD PRESSURE: 125 MMHG

## 2017-11-21 DIAGNOSIS — I25.10 CAD (CORONARY ARTERY DISEASE): ICD-10-CM

## 2017-11-21 PROCEDURE — 99152 MOD SED SAME PHYS/QHP 5/>YRS: CPT

## 2017-11-21 PROCEDURE — 99153 MOD SED SAME PHYS/QHP EA: CPT

## 2017-11-21 PROCEDURE — 93571 IV DOP VEL&/PRESS C FLO 1ST: CPT

## 2017-11-21 PROCEDURE — 4A033BC MEASUREMENT OF ARTERIAL PRESSURE, CORONARY, PERCUTANEOUS APPROACH: ICD-10-PCS | Performed by: INTERNAL MEDICINE

## 2017-11-21 PROCEDURE — B2111ZZ FLUOROSCOPY OF MULTIPLE CORONARY ARTERIES USING LOW OSMOLAR CONTRAST: ICD-10-PCS | Performed by: INTERNAL MEDICINE

## 2017-11-21 PROCEDURE — 4A023N7 MEASUREMENT OF CARDIAC SAMPLING AND PRESSURE, LEFT HEART, PERCUTANEOUS APPROACH: ICD-10-PCS | Performed by: INTERNAL MEDICINE

## 2017-11-21 PROCEDURE — B2151ZZ FLUOROSCOPY OF LEFT HEART USING LOW OSMOLAR CONTRAST: ICD-10-PCS | Performed by: INTERNAL MEDICINE

## 2017-11-21 PROCEDURE — 93458 L HRT ARTERY/VENTRICLE ANGIO: CPT

## 2017-11-21 RX ORDER — HEPARIN SODIUM 1000 [USP'U]/ML
INJECTION, SOLUTION INTRAVENOUS; SUBCUTANEOUS
Status: COMPLETED
Start: 2017-11-21 | End: 2017-11-21

## 2017-11-21 RX ORDER — SODIUM CHLORIDE 9 MG/ML
INJECTION, SOLUTION INTRAVENOUS
Status: DISCONTINUED
Start: 2017-11-21 | End: 2017-11-21

## 2017-11-21 RX ORDER — LIDOCAINE HYDROCHLORIDE 20 MG/ML
INJECTION, SOLUTION EPIDURAL; INFILTRATION; INTRACAUDAL; PERINEURAL
Status: COMPLETED
Start: 2017-11-21 | End: 2017-11-21

## 2017-11-21 RX ORDER — MIDAZOLAM HYDROCHLORIDE 1 MG/ML
INJECTION INTRAMUSCULAR; INTRAVENOUS
Status: COMPLETED
Start: 2017-11-21 | End: 2017-11-21

## 2017-11-21 RX ORDER — SODIUM CHLORIDE 9 MG/ML
INJECTION, SOLUTION INTRAVENOUS CONTINUOUS
Status: ACTIVE | OUTPATIENT
Start: 2017-11-21 | End: 2017-11-21

## 2017-11-21 RX ORDER — ASPIRIN 81 MG/1
TABLET, CHEWABLE ORAL
Status: DISCONTINUED
Start: 2017-11-21 | End: 2017-11-21

## 2017-11-21 RX ORDER — FUROSEMIDE 40 MG/1
40 TABLET ORAL DAILY
Status: DISCONTINUED | OUTPATIENT
Start: 2017-11-22 | End: 2017-11-21

## 2017-11-21 RX ORDER — ASPIRIN 81 MG/1
324 TABLET, CHEWABLE ORAL ONCE
Status: COMPLETED | OUTPATIENT
Start: 2017-11-21 | End: 2017-11-21

## 2017-11-21 RX ORDER — FUROSEMIDE 40 MG/1
40 TABLET ORAL DAILY
Qty: 30 TABLET | Refills: 0 | Status: SHIPPED | OUTPATIENT
Start: 2017-11-22 | End: 2018-08-08 | Stop reason: DRUGHIGH

## 2017-11-21 RX ADMIN — ASPIRIN 243 MG: 81 TABLET, CHEWABLE ORAL at 10:00:00

## 2017-11-21 RX ADMIN — SODIUM CHLORIDE 21 ML/HR: 9 INJECTION, SOLUTION INTRAVENOUS at 10:14:00

## 2017-11-21 NOTE — PROCEDURES
[facility]    PATIENT'S NAME: Eh Vinicio   ATTENDING PHYSICIAN: Isaac Coyne MD   OPERATING PHYSICIAN: Babita Coyne MD   PATIENT ACCOUNT#:   [id number]    LOCATION:  [address]  MEDICAL RECORD #:   [id number] ventricular contractility with elevated end-diastolic pressure and moderate mitral regurgitation. Dictated By Abbey Foster MD  d: 11/21/2017 12:45:18  t: 11/21/2017 12:56:18  [facility] [id number]  LAB/

## 2017-11-21 NOTE — PROGRESS NOTES
[facility]    Brief Cardiac Cath Note  Alda Cavanaugh Patient Status:  Outpatient in a Bed    1940 MRN [id number]   Location [facility] Attending Martha Lazcano, [address] [facility] Day # 0 PCP Nano Cole

## 2017-11-26 NOTE — TELEPHONE ENCOUNTER
I showed this to [address] [place] [address] her t check on this. I will forward this to Yana Macias to let me know if she has an information about this.   Thank you!!

## 2017-11-28 ENCOUNTER — APPOINTMENT (OUTPATIENT)
Dept: LAB | Age: 77
End: 2017-11-28
Attending: INTERNAL MEDICINE
Payer: MEDICARE

## 2017-11-28 ENCOUNTER — PRIOR ORIGINAL RECORDS (OUTPATIENT)
Dept: OTHER | Age: 77
End: 2017-11-28

## 2017-11-28 DIAGNOSIS — I50.20 SYSTOLIC HEART FAILURE, UNSPECIFIED HEART FAILURE CHRONICITY: ICD-10-CM

## 2017-11-28 PROCEDURE — 36415 COLL VENOUS BLD VENIPUNCTURE: CPT

## 2017-11-28 PROCEDURE — 80048 BASIC METABOLIC PNL TOTAL CA: CPT

## 2017-11-28 PROCEDURE — 83880 ASSAY OF NATRIURETIC PEPTIDE: CPT

## 2017-11-29 LAB
BNP: 14 PMOL/L
BUN: 35 MG/DL
CALCIUM: 9.8 MG/DL
CHLORIDE: 100 MEQ/L
CREATININE, SERUM: 1.7 MG/DL
GLUCOSE: 137 MG/DL
POTASSIUM, SERUM: 4.4 MEQ/L
SODIUM: 136 MEQ/L

## 2017-11-30 ENCOUNTER — TELEPHONE (OUTPATIENT)
Dept: INTERNAL MEDICINE CLINIC | Facility: CLINIC | Age: 77
End: 2017-11-30

## 2017-11-30 LAB
CHOLESTEROL, TOTAL: 132 MG/DL
HDL CHOLESTEROL: 29 MG/DL
HEMATOCRIT: 44.3 %
HEMOGLOBIN: 15.2 G/DL
LDL CHOLESTEROL: 83 MG/DL
NON-HDL CHOLESTEROL: 103 MG/DL
PLATELETS: 170 K/UL
RED BLOOD COUNT: 4.86 X 10-6/U
TRIGLYCERIDES: 101 MG/DL
WHITE BLOOD COUNT: 9 X 10-3/U

## 2017-11-30 NOTE — TELEPHONE ENCOUNTER
Prior authorization form received for Celebrex 200 mg cap  In purple folder    Please send completed form to fax# [phone]

## 2017-11-30 NOTE — TELEPHONE ENCOUNTER
sPoke to pt - --we discussed pt has decided to purchase OTC as insurance does not cover any PPI - we talked about he can start at Omeprazole 20 mg daily and increase to 40 mg daily if symptoms are not controlled; Pt repeated;    He will not be starting th

## 2017-12-01 ENCOUNTER — PRIOR ORIGINAL RECORDS (OUTPATIENT)
Dept: OTHER | Age: 77
End: 2017-12-01

## 2017-12-08 ENCOUNTER — LAB ENCOUNTER (OUTPATIENT)
Dept: LAB | Age: 77
End: 2017-12-08
Attending: INTERNAL MEDICINE
Payer: MEDICARE

## 2017-12-08 ENCOUNTER — PRIOR ORIGINAL RECORDS (OUTPATIENT)
Dept: OTHER | Age: 77
End: 2017-12-08

## 2017-12-08 DIAGNOSIS — I10 ESSENTIAL HYPERTENSION, MALIGNANT: Primary | ICD-10-CM

## 2017-12-08 PROCEDURE — 80048 BASIC METABOLIC PNL TOTAL CA: CPT

## 2017-12-08 PROCEDURE — 83880 ASSAY OF NATRIURETIC PEPTIDE: CPT

## 2017-12-08 PROCEDURE — 36415 COLL VENOUS BLD VENIPUNCTURE: CPT

## 2017-12-11 ENCOUNTER — TELEPHONE (OUTPATIENT)
Dept: INTERNAL MEDICINE CLINIC | Facility: CLINIC | Age: 77
End: 2017-12-11

## 2017-12-11 ENCOUNTER — PRIOR ORIGINAL RECORDS (OUTPATIENT)
Dept: OTHER | Age: 77
End: 2017-12-11

## 2017-12-11 LAB
BNP: 28 PMOL/L
BUN: 25 MG/DL
CALCIUM: 9.4 MG/DL
CHLORIDE: 104 MEQ/L
CREATININE, SERUM: 1.22 MG/DL
GLUCOSE: 130 MG/DL
POTASSIUM, SERUM: 4.6 MEQ/L
SODIUM: 136 MEQ/L

## 2017-12-11 NOTE — TELEPHONE ENCOUNTER
Patient called back. Symptoms started 3-4 months ago. Was at Dr Lisa River office about a month ago and was recommended to see his heart doctor right away, which he did. He had angiogram done about 3 weeks ago.   Angiogram did not show any problems wi

## 2017-12-11 NOTE — TELEPHONE ENCOUNTER
[phone] Sherrill Holstein spouse  Both legs are swollen and they would like to speak to Dr JEFRY conner?    To clinical

## 2017-12-13 ENCOUNTER — PRIOR ORIGINAL RECORDS (OUTPATIENT)
Dept: OTHER | Age: 77
End: 2017-12-13

## 2017-12-15 NOTE — TELEPHONE ENCOUNTER
As FYI to DR. TATUM - called patient who stateshe is feeling ok, still same SX . With any activity he gets SOB.  Started Lasix 20 mg 2 days ago and is also on spironolactone

## 2017-12-15 NOTE — TELEPHONE ENCOUNTER
Telephone call to patient and message left. I reviewed the lab data for the last few weeks that is here with Dr. Virgene Hashimoto. The patient's recent BMP was improved from before and the kidney function had returned almost to where it had been before.   The patient

## 2017-12-22 NOTE — TELEPHONE ENCOUNTER
Telephone call to patient and discussed with patient. Patient feels that he is slowly improving. He has an appointment to see me in January which she will keep.

## 2017-12-28 ENCOUNTER — OFFICE VISIT (OUTPATIENT)
Dept: INTERNAL MEDICINE CLINIC | Facility: CLINIC | Age: 77
End: 2017-12-28

## 2017-12-28 VITALS
DIASTOLIC BLOOD PRESSURE: 70 MMHG | BODY MASS INDEX: 32.24 KG/M2 | WEIGHT: 203 LBS | TEMPERATURE: 98 F | SYSTOLIC BLOOD PRESSURE: 124 MMHG | OXYGEN SATURATION: 93 % | HEART RATE: 71 BPM | HEIGHT: 66.5 IN

## 2017-12-28 DIAGNOSIS — J06.9 URI, ACUTE: Primary | ICD-10-CM

## 2017-12-28 DIAGNOSIS — I34.0 MITRAL VALVE INSUFFICIENCY, UNSPECIFIED ETIOLOGY: ICD-10-CM

## 2017-12-28 DIAGNOSIS — I10 ESSENTIAL HYPERTENSION: ICD-10-CM

## 2017-12-28 DIAGNOSIS — E11.9 TYPE 2 DIABETES MELLITUS WITHOUT COMPLICATION, WITHOUT LONG-TERM CURRENT USE OF INSULIN (HCC): ICD-10-CM

## 2017-12-28 DIAGNOSIS — E78.00 HYPERCHOLESTEROLEMIA: ICD-10-CM

## 2017-12-28 DIAGNOSIS — I25.10 ASHD (ARTERIOSCLEROTIC HEART DISEASE): ICD-10-CM

## 2017-12-28 PROCEDURE — G0463 HOSPITAL OUTPT CLINIC VISIT: HCPCS | Performed by: INTERNAL MEDICINE

## 2017-12-28 PROCEDURE — 99214 OFFICE O/P EST MOD 30 MIN: CPT | Performed by: INTERNAL MEDICINE

## 2017-12-28 RX ORDER — AZITHROMYCIN 250 MG/1
TABLET, FILM COATED ORAL
Qty: 6 TABLET | Refills: 1 | Status: SHIPPED | OUTPATIENT
Start: 2017-12-28 | End: 2018-01-24 | Stop reason: ALTCHOICE

## 2017-12-28 NOTE — PROGRESS NOTES
Loan Gonsalez is a 65year old male.     HPI:   Patient presents with:  Checkup: Colds, cough, headache, stuffiness x 1 week; had fever at the onset of symptoms; generalized body weakness; SOB       64 y/o M with 5 d h/o +cough; +sinus and nasal congesti Take 0.5 mg by mouth 2 (two) times daily as needed for Anxiety. Disp:  Rfl:    losartan 100 MG Oral Tab Take 100 mg by mouth daily. Disp:  Rfl:    MetFORMIN HCl 1000 MG Oral Tab Take 1,000 mg by mouth 2 (two) times daily with meals.  Disp:  Rfl:    Metoprol non-tender and non-distended  Extremities: no clubbing, cyanosis or edema           ASSESSMENT/PLAN:   URI  Zithromax (Z-jm) as directed x5d with 1RF if needed    CAD  CATH on 11/21/17 with Dr Jarred Doan showed widely patent stents in the mid LAD and mid and [facility]

## 2018-01-04 ENCOUNTER — PRIOR ORIGINAL RECORDS (OUTPATIENT)
Dept: OTHER | Age: 78
End: 2018-01-04

## 2018-01-04 ENCOUNTER — LAB ENCOUNTER (OUTPATIENT)
Dept: LAB | Age: 78
End: 2018-01-04
Attending: INTERNAL MEDICINE
Payer: MEDICARE

## 2018-01-04 DIAGNOSIS — I50.20 SYSTOLIC HEART FAILURE (HCC): Primary | ICD-10-CM

## 2018-01-04 LAB
ANION GAP SERPL CALC-SCNC: 8 MMOL/L (ref 0–18)
BNP SERPL-MCNC: 32 PG/ML (ref 0–100)
BUN SERPL-MCNC: 26 MG/DL (ref 8–20)
BUN/CREAT SERPL: 18.7 (ref 10–20)
CALCIUM SERPL-MCNC: 9 MG/DL (ref 8.5–10.5)
CHLORIDE SERPL-SCNC: 104 MMOL/L (ref 95–110)
CO2 SERPL-SCNC: 22 MMOL/L (ref 22–32)
CREAT SERPL-MCNC: 1.39 MG/DL (ref 0.5–1.5)
GLUCOSE SERPL-MCNC: 249 MG/DL (ref 70–99)
OSMOLALITY UR CALC.SUM OF ELEC: 291 MOSM/KG (ref 275–295)
POTASSIUM SERPL-SCNC: 4.3 MMOL/L (ref 3.3–5.1)
SODIUM SERPL-SCNC: 134 MMOL/L (ref 136–144)

## 2018-01-04 PROCEDURE — 83880 ASSAY OF NATRIURETIC PEPTIDE: CPT

## 2018-01-04 PROCEDURE — 36415 COLL VENOUS BLD VENIPUNCTURE: CPT

## 2018-01-04 PROCEDURE — 80048 BASIC METABOLIC PNL TOTAL CA: CPT

## 2018-01-05 LAB
BNP: 32 PMOL/L
BUN: 26 MG/DL
CALCIUM: 9 MG/DL
CHLORIDE: 104 MEQ/L
CREATININE, SERUM: 1.39 MG/DL
GLUCOSE: 249 MG/DL
POTASSIUM, SERUM: 4.3 MEQ/L
SODIUM: 134 MEQ/L

## 2018-01-08 ENCOUNTER — PRIOR ORIGINAL RECORDS (OUTPATIENT)
Dept: OTHER | Age: 78
End: 2018-01-08

## 2018-01-17 ENCOUNTER — APPOINTMENT (OUTPATIENT)
Dept: LAB | Age: 78
End: 2018-01-17
Attending: INTERNAL MEDICINE
Payer: MEDICARE

## 2018-01-17 ENCOUNTER — PRIOR ORIGINAL RECORDS (OUTPATIENT)
Dept: OTHER | Age: 78
End: 2018-01-17

## 2018-01-17 DIAGNOSIS — E78.00 HYPERCHOLESTEROLEMIA: ICD-10-CM

## 2018-01-17 DIAGNOSIS — E78.1 HYPERTRIGLYCERIDEMIA: ICD-10-CM

## 2018-01-17 DIAGNOSIS — E11.9 TYPE 2 DIABETES MELLITUS WITHOUT COMPLICATION, WITHOUT LONG-TERM CURRENT USE OF INSULIN (HCC): ICD-10-CM

## 2018-01-17 LAB
ALT SERPL-CCNC: 27 U/L (ref 17–63)
ANION GAP SERPL CALC-SCNC: 10 MMOL/L (ref 0–18)
AST SERPL-CCNC: 27 U/L (ref 15–41)
BUN SERPL-MCNC: 24 MG/DL (ref 8–20)
BUN/CREAT SERPL: 18.2 (ref 10–20)
CALCIUM SERPL-MCNC: 9.4 MG/DL (ref 8.5–10.5)
CHLORIDE SERPL-SCNC: 100 MMOL/L (ref 95–110)
CHOLEST SERPL-MCNC: 115 MG/DL (ref 110–200)
CO2 SERPL-SCNC: 23 MMOL/L (ref 22–32)
CREAT SERPL-MCNC: 1.32 MG/DL (ref 0.5–1.5)
GLUCOSE SERPL-MCNC: 124 MG/DL (ref 70–99)
HBA1C MFR BLD: 6.6 % (ref 4–6)
HDLC SERPL-MCNC: 22 MG/DL
LDLC SERPL CALC-MCNC: 71 MG/DL (ref 0–99)
NONHDLC SERPL-MCNC: 93 MG/DL
OSMOLALITY UR CALC.SUM OF ELEC: 281 MOSM/KG (ref 275–295)
POTASSIUM SERPL-SCNC: 4.6 MMOL/L (ref 3.3–5.1)
SODIUM SERPL-SCNC: 133 MMOL/L (ref 136–144)
TRIGL SERPL-MCNC: 111 MG/DL (ref 1–149)

## 2018-01-17 PROCEDURE — 83036 HEMOGLOBIN GLYCOSYLATED A1C: CPT

## 2018-01-17 PROCEDURE — 84450 TRANSFERASE (AST) (SGOT): CPT

## 2018-01-17 PROCEDURE — 80061 LIPID PANEL: CPT

## 2018-01-17 PROCEDURE — 36415 COLL VENOUS BLD VENIPUNCTURE: CPT

## 2018-01-17 PROCEDURE — 80048 BASIC METABOLIC PNL TOTAL CA: CPT

## 2018-01-17 PROCEDURE — 84460 ALANINE AMINO (ALT) (SGPT): CPT

## 2018-01-24 ENCOUNTER — OFFICE VISIT (OUTPATIENT)
Dept: INTERNAL MEDICINE CLINIC | Facility: CLINIC | Age: 78
End: 2018-01-24

## 2018-01-24 ENCOUNTER — HOSPITAL ENCOUNTER (OUTPATIENT)
Dept: GENERAL RADIOLOGY | Age: 78
Discharge: HOME | End: 2018-01-24
Attending: INTERNAL MEDICINE | Admitting: INTERNAL MEDICINE
Payer: MEDICARE

## 2018-01-24 VITALS
HEART RATE: 60 BPM | TEMPERATURE: 98 F | DIASTOLIC BLOOD PRESSURE: 60 MMHG | HEIGHT: 66.5 IN | OXYGEN SATURATION: 97 % | SYSTOLIC BLOOD PRESSURE: 100 MMHG | BODY MASS INDEX: 32.21 KG/M2 | WEIGHT: 202.81 LBS

## 2018-01-24 DIAGNOSIS — E78.00 HYPERCHOLESTEREMIA: ICD-10-CM

## 2018-01-24 DIAGNOSIS — I10 ESSENTIAL HYPERTENSION: ICD-10-CM

## 2018-01-24 DIAGNOSIS — R53.83 FATIGUE, UNSPECIFIED TYPE: ICD-10-CM

## 2018-01-24 DIAGNOSIS — R06.02 SOB (SHORTNESS OF BREATH): ICD-10-CM

## 2018-01-24 DIAGNOSIS — N40.1 BENIGN NON-NODULAR PROSTATIC HYPERPLASIA WITH LOWER URINARY TRACT SYMPTOMS: ICD-10-CM

## 2018-01-24 DIAGNOSIS — I34.0 MITRAL VALVE INSUFFICIENCY, UNSPECIFIED ETIOLOGY: ICD-10-CM

## 2018-01-24 DIAGNOSIS — R06.00 DOE (DYSPNEA ON EXERTION): ICD-10-CM

## 2018-01-24 DIAGNOSIS — I25.10 ASHD (ARTERIOSCLEROTIC HEART DISEASE): Primary | ICD-10-CM

## 2018-01-24 DIAGNOSIS — K58.9 IRRITABLE BOWEL SYNDROME WITHOUT DIARRHEA: ICD-10-CM

## 2018-01-24 DIAGNOSIS — E78.1 HYPERTRIGLYCERIDEMIA: ICD-10-CM

## 2018-01-24 DIAGNOSIS — E11.9 TYPE 2 DIABETES MELLITUS WITHOUT COMPLICATION, WITHOUT LONG-TERM CURRENT USE OF INSULIN (HCC): ICD-10-CM

## 2018-01-24 PROCEDURE — 99214 OFFICE O/P EST MOD 30 MIN: CPT | Performed by: INTERNAL MEDICINE

## 2018-01-24 PROCEDURE — 71046 X-RAY EXAM CHEST 2 VIEWS: CPT | Performed by: INTERNAL MEDICINE

## 2018-01-24 PROCEDURE — G0463 HOSPITAL OUTPT CLINIC VISIT: HCPCS | Performed by: INTERNAL MEDICINE

## 2018-01-24 NOTE — PROGRESS NOTES
Dai Ware is a 65year old male. Patient presents with:  Checkup: 3 month. Ashd  Hypertension    HPI:   Patient presents with:  Checkup: 3 month. Ashd  Hypertension    Patient feels okay.   Patient notes that he still gets short of breath with exe 1 capsule (0.4 mg total) by mouth daily.  Disp: 90 capsule Rfl: 3   glimepiride 2 MG Oral Tab Take 1 tablet (2 mg total) by mouth daily with breakfast. Disp: 90 tablet Rfl: 3   aspirin (ASPIRIN ADULT LOW STRENGTH) 81 MG Oral Tab EC Take 1 tablet by mouth da axilla. GI:Protuberant, BS are present, no organomegaly or palpable masses  EXTREMITIES: no edema  NEURO: alert and oriented  ASSESSMENT AND PLAN:   1. ASHD (arteriosclerotic heart disease)  Stable.   CPM.  I will plan see the patient back in 3 months with noted above. I will discuss the results of the patient's chest x-ray and pulmonary function studies when they become available.     Timothy Zhang MD  1/24/2018  10:47 AM

## 2018-01-31 ENCOUNTER — HOSPITAL ENCOUNTER (OUTPATIENT)
Dept: RESPIRATORY THERAPY | Facility: HOSPITAL | Age: 78
Discharge: HOME | End: 2018-01-31
Attending: INTERNAL MEDICINE
Payer: MEDICARE

## 2018-01-31 DIAGNOSIS — R06.00 DOE (DYSPNEA ON EXERTION): ICD-10-CM

## 2018-01-31 DIAGNOSIS — R06.02 SOB (SHORTNESS OF BREATH): ICD-10-CM

## 2018-01-31 PROCEDURE — 94726 PLETHYSMOGRAPHY LUNG VOLUMES: CPT

## 2018-01-31 PROCEDURE — 94060 EVALUATION OF WHEEZING: CPT

## 2018-01-31 PROCEDURE — 94729 DIFFUSING CAPACITY: CPT

## 2018-01-31 NOTE — PROCEDURES
Pulmonary Function Test Results     Impression: Mild obstructive ventilatory defect based on flow volume loop. There is a bronchodilator response (FEV1 2.85L --> 3.24L, +14%). There is air trapping and hyperinflation.  The DLCO is normal.

## 2018-02-01 ENCOUNTER — TELEPHONE (OUTPATIENT)
Dept: INTERNAL MEDICINE CLINIC | Facility: CLINIC | Age: 78
End: 2018-02-01

## 2018-02-02 NOTE — TELEPHONE ENCOUNTER
Telephone call to patient and recent test results reviewed. Patient's chest x-ray had some mild scarring in the right lower lung. There also is some elevation of the right hemidiaphragm.   The chest x-ray is otherwise normal.  Heart size is normal and cory

## 2018-02-08 RX ORDER — LORAZEPAM 0.5 MG/1
TABLET ORAL
Qty: 30 TABLET | Refills: 5 | OUTPATIENT
Start: 2018-02-08 | End: 2018-02-09

## 2018-02-09 RX ORDER — LORAZEPAM 0.5 MG/1
TABLET ORAL
Qty: 30 TABLET | Refills: 5 | COMMUNITY
Start: 2018-02-09 | End: 2018-08-22

## 2018-02-09 NOTE — TELEPHONE ENCOUNTER
Noted.  I have approved a refill of this patient's medication with 5 additional refills. Please call the refill into the patient's pharmacy as requested. I will route this to nursing.   Thank you!!

## 2018-02-26 RX ORDER — GLIMEPIRIDE 2 MG/1
TABLET ORAL
Qty: 90 TABLET | Refills: 3 | Status: SHIPPED | OUTPATIENT
Start: 2018-02-26 | End: 2019-02-25

## 2018-03-13 NOTE — TELEPHONE ENCOUNTER
To MD:  The above refill request is for a controlled substance. Please indicate yes or no to refill 30 days supply plus one refill.   If more refills are appropriate, please indicate quantity    Last refilled 09/15/2017 #90/1

## 2018-03-14 LAB
ALT (SGPT): 27 U/L
AST (SGOT): 27 U/L
BUN: 24 MG/DL
CALCIUM: 9.4 MG/DL
CHLORIDE: 100 MEQ/L
CHOLESTEROL, TOTAL: 115 MG/DL
CREATININE, SERUM: 1.32 MG/DL
GLUCOSE: 124 MG/DL
GLUCOSE: 124 MG/DL
HDL CHOLESTEROL: 22 MG/DL
LDL CHOLESTEROL: 71 MG/DL
NON-HDL CHOLESTEROL: 93 MG/DL
POTASSIUM, SERUM: 4.6 MEQ/L
SODIUM: 133 MEQ/L
TRIGLYCERIDES: 111 MG/DL

## 2018-03-14 RX ORDER — ZOLPIDEM TARTRATE 10 MG/1
TABLET ORAL
Qty: 90 TABLET | Refills: 1 | OUTPATIENT
Start: 2018-03-14 | End: 2018-03-14

## 2018-03-14 RX ORDER — ZOLPIDEM TARTRATE 10 MG/1
TABLET ORAL
Qty: 90 TABLET | Refills: 1 | OUTPATIENT
Start: 2018-03-14 | End: 2018-09-11

## 2018-03-14 RX ORDER — ZOLPIDEM TARTRATE 10 MG/1
TABLET ORAL
Qty: 90 TABLET | Refills: 1 | Status: SHIPPED | OUTPATIENT
Start: 2018-03-14 | End: 2018-03-14

## 2018-03-14 NOTE — TELEPHONE ENCOUNTER
Noted.  I have approved the patient's refill for his medication as requested. Please phoned in the prescription to his pharmacy with 1 refill as requested. I will route this to nursing.   Thank you!!

## 2018-03-28 ENCOUNTER — PRIOR ORIGINAL RECORDS (OUTPATIENT)
Dept: OTHER | Age: 78
End: 2018-03-28

## 2018-04-05 RX ORDER — DUTASTERIDE 0.5 MG/1
CAPSULE, LIQUID FILLED ORAL
Qty: 90 CAPSULE | Refills: 3 | Status: SHIPPED | OUTPATIENT
Start: 2018-04-05 | End: 2019-04-02

## 2018-04-05 RX ORDER — TAMSULOSIN HYDROCHLORIDE 0.4 MG/1
CAPSULE ORAL
Qty: 90 CAPSULE | Refills: 3 | Status: SHIPPED | OUTPATIENT
Start: 2018-04-05 | End: 2019-04-02

## 2018-04-11 ENCOUNTER — MYAURORA ACCOUNT LINK (OUTPATIENT)
Dept: OTHER | Age: 78
End: 2018-04-11

## 2018-04-11 ENCOUNTER — PRIOR ORIGINAL RECORDS (OUTPATIENT)
Dept: OTHER | Age: 78
End: 2018-04-11

## 2018-04-18 RX ORDER — PANTOPRAZOLE SODIUM 40 MG/1
TABLET, DELAYED RELEASE ORAL
Qty: 90 TABLET | Refills: 3 | OUTPATIENT
Start: 2018-04-18

## 2018-05-02 ENCOUNTER — TELEPHONE (OUTPATIENT)
Dept: INTERNAL MEDICINE CLINIC | Facility: CLINIC | Age: 78
End: 2018-05-02

## 2018-05-02 ENCOUNTER — LAB ENCOUNTER (OUTPATIENT)
Dept: LAB | Age: 78
End: 2018-05-02
Attending: INTERNAL MEDICINE
Payer: MEDICARE

## 2018-05-02 DIAGNOSIS — E11.9 TYPE 2 DIABETES MELLITUS WITHOUT COMPLICATION, WITHOUT LONG-TERM CURRENT USE OF INSULIN (HCC): ICD-10-CM

## 2018-05-02 DIAGNOSIS — E78.1 HYPERTRIGLYCERIDEMIA: ICD-10-CM

## 2018-05-02 DIAGNOSIS — R53.83 FATIGUE, UNSPECIFIED TYPE: ICD-10-CM

## 2018-05-02 DIAGNOSIS — E78.00 HYPERCHOLESTEREMIA: ICD-10-CM

## 2018-05-02 PROCEDURE — 80048 BASIC METABOLIC PNL TOTAL CA: CPT

## 2018-05-02 PROCEDURE — 36415 COLL VENOUS BLD VENIPUNCTURE: CPT

## 2018-05-02 PROCEDURE — 84450 TRANSFERASE (AST) (SGOT): CPT

## 2018-05-02 PROCEDURE — 84460 ALANINE AMINO (ALT) (SGPT): CPT

## 2018-05-02 PROCEDURE — 85025 COMPLETE CBC W/AUTO DIFF WBC: CPT

## 2018-05-02 PROCEDURE — 83036 HEMOGLOBIN GLYCOSYLATED A1C: CPT

## 2018-05-02 PROCEDURE — 80061 LIPID PANEL: CPT

## 2018-05-02 NOTE — TELEPHONE ENCOUNTER
Please call pt, having minor/out patient surgery on his leg Tuesday, 5/8/18  Pt wondering if Dr Shannon Torres would want to start him on antibiotic prior to that  Please call pt to advise  [facility] uses [facility] [place] as pharmacy  Tasked to nursing

## 2018-05-02 NOTE — TELEPHONE ENCOUNTER
Called and spoke to patient's spouse per hipaa. She states that on Tuesday, Shalonda Dad is supposed to have a procedure done by Dr Jose M Regan to stitch up an open area on his shin. She states he has basal cell to his shin.  The area looks like a bad scrape that is

## 2018-05-07 ENCOUNTER — OFFICE VISIT (OUTPATIENT)
Dept: INTERNAL MEDICINE CLINIC | Facility: CLINIC | Age: 78
End: 2018-05-07

## 2018-05-07 VITALS
HEART RATE: 80 BPM | WEIGHT: 207.81 LBS | TEMPERATURE: 98 F | HEIGHT: 66.5 IN | SYSTOLIC BLOOD PRESSURE: 130 MMHG | OXYGEN SATURATION: 97 % | DIASTOLIC BLOOD PRESSURE: 66 MMHG | BODY MASS INDEX: 33 KG/M2

## 2018-05-07 DIAGNOSIS — I10 ESSENTIAL HYPERTENSION: ICD-10-CM

## 2018-05-07 DIAGNOSIS — E78.00 HYPERCHOLESTEREMIA: ICD-10-CM

## 2018-05-07 DIAGNOSIS — E11.9 TYPE 2 DIABETES MELLITUS WITHOUT COMPLICATION, WITHOUT LONG-TERM CURRENT USE OF INSULIN (HCC): ICD-10-CM

## 2018-05-07 DIAGNOSIS — E78.1 HYPERTRIGLYCERIDEMIA: ICD-10-CM

## 2018-05-07 DIAGNOSIS — I34.0 MITRAL VALVE INSUFFICIENCY, UNSPECIFIED ETIOLOGY: ICD-10-CM

## 2018-05-07 DIAGNOSIS — N18.30 STAGE 3 CHRONIC KIDNEY DISEASE (HCC): ICD-10-CM

## 2018-05-07 DIAGNOSIS — I25.10 ASHD (ARTERIOSCLEROTIC HEART DISEASE): Primary | ICD-10-CM

## 2018-05-07 DIAGNOSIS — K58.9 IRRITABLE BOWEL SYNDROME WITHOUT DIARRHEA: ICD-10-CM

## 2018-05-07 DIAGNOSIS — R53.83 FATIGUE, UNSPECIFIED TYPE: ICD-10-CM

## 2018-05-07 DIAGNOSIS — R06.00 DOE (DYSPNEA ON EXERTION): ICD-10-CM

## 2018-05-07 DIAGNOSIS — N40.1 BENIGN NON-NODULAR PROSTATIC HYPERPLASIA WITH LOWER URINARY TRACT SYMPTOMS: ICD-10-CM

## 2018-05-07 PROCEDURE — 99214 OFFICE O/P EST MOD 30 MIN: CPT | Performed by: INTERNAL MEDICINE

## 2018-05-07 PROCEDURE — G0463 HOSPITAL OUTPT CLINIC VISIT: HCPCS | Performed by: INTERNAL MEDICINE

## 2018-05-07 RX ORDER — FUROSEMIDE 20 MG/1
20 TABLET ORAL EVERY OTHER DAY
Refills: 3 | COMMUNITY
Start: 2018-03-29 | End: 2018-11-08

## 2018-05-07 NOTE — PROGRESS NOTES
Héctor Shields is a 65year old male. Patient presents with:  Checkup: 3 month. Patient scheduled for derm procedure tomorrow with .  site: [facility]     Medication Follow-Up: patient would like to have lasix d/c. c/o too much urinary freq. states he had have a mild COPD with some reversibility noted. Patient complains of the Lasix gets too much urinary frequency for about 2 or 3 hours every day. Patient is also cut back on his dose of metoprolol to 50 mg orally daily due to bradycardia.   He did this und triglycerides    • IBS (irritable bowel syndrome)    • No diabetic retinopathy OU 2014   • Osteoarthrosis, unspecified whether generalized or localized, unspecified site    • Spinal stenosis    • Type II or unspecified type diabetes mellitus without mentio catheterization and angiogram turned out well in November of last year. They were done by Dr. Armand Elizabeth. I will plan to see the patient back in 3 months with blood tests which will include a CBC, BMP, hemoglobin A1c, lipid panel, AST and ALT.   Patient will co monitor this. The patient indicates understanding of these issues and agrees to the plan. The patient is asked to return in 3 months with blood tests as noted above. Michael Fraire MD  5/7/2018  10:43 AM

## 2018-05-07 NOTE — PATIENT INSTRUCTIONS
1.  Patient is to continue his current diet, medication and activity. 2.  I will refer the patient to see either Dr. Joshua Massey or Dr. Filomena Wray for a pulmonary evaluation concerning his dyspnea on exertion.   3.  Patient is to continue to follow-up with Dr. Radha Quinonez

## 2018-06-20 ENCOUNTER — PRIOR ORIGINAL RECORDS (OUTPATIENT)
Dept: OTHER | Age: 78
End: 2018-06-20

## 2018-07-09 RX ORDER — LOSARTAN POTASSIUM 100 MG/1
TABLET ORAL
Qty: 90 TABLET | Refills: 3 | Status: SHIPPED | OUTPATIENT
Start: 2018-07-09 | End: 2019-07-01

## 2018-08-03 ENCOUNTER — LAB ENCOUNTER (OUTPATIENT)
Dept: LAB | Age: 78
End: 2018-08-03
Attending: INTERNAL MEDICINE
Payer: MEDICARE

## 2018-08-03 DIAGNOSIS — E78.1 HYPERTRIGLYCERIDEMIA: ICD-10-CM

## 2018-08-03 DIAGNOSIS — N18.30 STAGE 3 CHRONIC KIDNEY DISEASE (HCC): ICD-10-CM

## 2018-08-03 DIAGNOSIS — R53.83 FATIGUE, UNSPECIFIED TYPE: ICD-10-CM

## 2018-08-03 DIAGNOSIS — E11.9 TYPE 2 DIABETES MELLITUS WITHOUT COMPLICATION, WITHOUT LONG-TERM CURRENT USE OF INSULIN (HCC): ICD-10-CM

## 2018-08-03 DIAGNOSIS — E78.00 HYPERCHOLESTEREMIA: ICD-10-CM

## 2018-08-03 LAB
ALT SERPL-CCNC: 32 U/L (ref 17–63)
ANION GAP SERPL CALC-SCNC: 8 MMOL/L (ref 0–18)
AST SERPL-CCNC: 30 U/L (ref 15–41)
BASOPHILS # BLD: 0 K/UL (ref 0–0.2)
BASOPHILS NFR BLD: 0 %
BUN SERPL-MCNC: 21 MG/DL (ref 8–20)
BUN/CREAT SERPL: 19.1 (ref 10–20)
CALCIUM SERPL-MCNC: 9.5 MG/DL (ref 8.5–10.5)
CHLORIDE SERPL-SCNC: 101 MMOL/L (ref 95–110)
CHOLEST SERPL-MCNC: 145 MG/DL (ref 110–200)
CO2 SERPL-SCNC: 25 MMOL/L (ref 22–32)
CREAT SERPL-MCNC: 1.1 MG/DL (ref 0.5–1.5)
EOSINOPHIL # BLD: 0.2 K/UL (ref 0–0.7)
EOSINOPHIL NFR BLD: 2 %
ERYTHROCYTE [DISTWIDTH] IN BLOOD BY AUTOMATED COUNT: 13.2 % (ref 11–15)
GLUCOSE SERPL-MCNC: 140 MG/DL (ref 70–99)
HBA1C MFR BLD: 6.4 % (ref 4–6)
HCT VFR BLD AUTO: 43.5 % (ref 41–52)
HDLC SERPL-MCNC: 25 MG/DL
HGB BLD-MCNC: 14.7 G/DL (ref 13.5–17.5)
LDLC SERPL CALC-MCNC: 97 MG/DL (ref 0–99)
LYMPHOCYTES # BLD: 1.8 K/UL (ref 1–4)
LYMPHOCYTES NFR BLD: 23 %
MCH RBC QN AUTO: 31 PG (ref 27–32)
MCHC RBC AUTO-ENTMCNC: 33.8 G/DL (ref 32–37)
MCV RBC AUTO: 91.8 FL (ref 80–100)
MONOCYTES # BLD: 0.6 K/UL (ref 0–1)
MONOCYTES NFR BLD: 8 %
NEUTROPHILS # BLD AUTO: 5 K/UL (ref 1.8–7.7)
NEUTROPHILS NFR BLD: 66 %
NONHDLC SERPL-MCNC: 120 MG/DL
OSMOLALITY UR CALC.SUM OF ELEC: 283 MOSM/KG (ref 275–295)
PLATELET # BLD AUTO: 180 K/UL (ref 140–400)
PMV BLD AUTO: 8.4 FL (ref 7.4–10.3)
POTASSIUM SERPL-SCNC: 4.5 MMOL/L (ref 3.3–5.1)
RBC # BLD AUTO: 4.73 M/UL (ref 4.5–5.9)
SODIUM SERPL-SCNC: 134 MMOL/L (ref 136–144)
TRIGL SERPL-MCNC: 115 MG/DL (ref 1–149)
WBC # BLD AUTO: 7.6 K/UL (ref 4–11)

## 2018-08-03 PROCEDURE — 83036 HEMOGLOBIN GLYCOSYLATED A1C: CPT

## 2018-08-03 PROCEDURE — 84450 TRANSFERASE (AST) (SGOT): CPT

## 2018-08-03 PROCEDURE — 84460 ALANINE AMINO (ALT) (SGPT): CPT

## 2018-08-03 PROCEDURE — 80061 LIPID PANEL: CPT

## 2018-08-03 PROCEDURE — 85025 COMPLETE CBC W/AUTO DIFF WBC: CPT

## 2018-08-03 PROCEDURE — 80048 BASIC METABOLIC PNL TOTAL CA: CPT

## 2018-08-03 PROCEDURE — 36415 COLL VENOUS BLD VENIPUNCTURE: CPT

## 2018-08-08 ENCOUNTER — OFFICE VISIT (OUTPATIENT)
Dept: INTERNAL MEDICINE CLINIC | Facility: CLINIC | Age: 78
End: 2018-08-08
Payer: MEDICARE

## 2018-08-08 VITALS
WEIGHT: 203 LBS | HEART RATE: 68 BPM | SYSTOLIC BLOOD PRESSURE: 120 MMHG | DIASTOLIC BLOOD PRESSURE: 60 MMHG | BODY MASS INDEX: 32 KG/M2 | OXYGEN SATURATION: 95 % | TEMPERATURE: 98 F

## 2018-08-08 DIAGNOSIS — Z00.00 ANNUAL PHYSICAL EXAM: ICD-10-CM

## 2018-08-08 DIAGNOSIS — E78.1 HYPERTRIGLYCERIDEMIA: ICD-10-CM

## 2018-08-08 DIAGNOSIS — R53.83 FATIGUE, UNSPECIFIED TYPE: ICD-10-CM

## 2018-08-08 DIAGNOSIS — I10 ESSENTIAL HYPERTENSION: ICD-10-CM

## 2018-08-08 DIAGNOSIS — Z12.5 PROSTATE CANCER SCREENING: ICD-10-CM

## 2018-08-08 DIAGNOSIS — R06.00 DOE (DYSPNEA ON EXERTION): ICD-10-CM

## 2018-08-08 DIAGNOSIS — K58.9 IRRITABLE BOWEL SYNDROME WITHOUT DIARRHEA: ICD-10-CM

## 2018-08-08 DIAGNOSIS — I34.0 MITRAL VALVE INSUFFICIENCY, UNSPECIFIED ETIOLOGY: ICD-10-CM

## 2018-08-08 DIAGNOSIS — N40.1 BENIGN NON-NODULAR PROSTATIC HYPERPLASIA WITH LOWER URINARY TRACT SYMPTOMS: ICD-10-CM

## 2018-08-08 DIAGNOSIS — N18.30 STAGE 3 CHRONIC KIDNEY DISEASE (HCC): ICD-10-CM

## 2018-08-08 DIAGNOSIS — I25.10 ASHD (ARTERIOSCLEROTIC HEART DISEASE): Primary | ICD-10-CM

## 2018-08-08 DIAGNOSIS — E78.00 HYPERCHOLESTEREMIA: ICD-10-CM

## 2018-08-08 DIAGNOSIS — E11.9 TYPE 2 DIABETES MELLITUS WITHOUT COMPLICATION, WITHOUT LONG-TERM CURRENT USE OF INSULIN (HCC): ICD-10-CM

## 2018-08-08 PROCEDURE — G0463 HOSPITAL OUTPT CLINIC VISIT: HCPCS | Performed by: INTERNAL MEDICINE

## 2018-08-08 PROCEDURE — 99214 OFFICE O/P EST MOD 30 MIN: CPT | Performed by: INTERNAL MEDICINE

## 2018-08-08 RX ORDER — PRAVASTATIN SODIUM 40 MG
40 TABLET ORAL NIGHTLY
Qty: 90 TABLET | Refills: 3 | Status: SHIPPED | OUTPATIENT
Start: 2018-08-08 | End: 2019-10-03

## 2018-08-08 RX ORDER — UBIDECARENONE 75 MG
1 CAPSULE ORAL DAILY
COMMUNITY

## 2018-08-08 NOTE — PROGRESS NOTES
Jun Trivedi is a 77year old male. Patient presents with:  Checkup: 3 mo f/u  Ashd  Hypertension  Hyperlipidemia  Diabetes    HPI:   Patient presents with:  Checkup: 3 mo f/u  Ashd  Hypertension  Hyperlipidemia  Diabetes    Patient feels well.   Patient Metoprolol Succinate  MG Oral Tablet 24 Hr Take 100 mg by mouth daily. Disp:  Rfl:    spironolactone 25 MG Oral Tab Take 25 mg by mouth daily. Disp:  Rfl:    aspirin (ASPIRIN ADULT LOW STRENGTH) 81 MG Oral Tab EC Take 1 tablet by mouth daily.  Disp: oriented  ASSESSMENT AND PLAN:   1. ASHD (arteriosclerotic heart disease)  Doing well.  CPM.  Patient to continue his current diet, medication and activity. I will increase the patient's Pravachol to 40 mg orally daily.   I discussed the patient's Lasix wi greater than 60. CPM.    11. MUSE (dyspnea on exertion)  Stable. CPM.  As above. Patient's dyspnea on exertion appears to be due to his weight and deconditioning. CPM.      The patient indicates understanding of these issues and agrees to the plan.   The

## 2018-08-08 NOTE — PATIENT INSTRUCTIONS
1.  Patient is to continue his current diet, medication and activity. 2.  Patient is to watch his diet and exercise and attempt to lose weight. Patient should attempt to lose at least 10 pounds.   3.  I will plan to see the patient back in 3 months with david

## 2018-08-24 RX ORDER — LORAZEPAM 0.5 MG/1
0.5 TABLET ORAL 2 TIMES DAILY PRN
Qty: 30 TABLET | Refills: 5 | OUTPATIENT
Start: 2018-08-24 | End: 2019-02-18

## 2018-08-24 NOTE — TELEPHONE ENCOUNTER
Noted.  I have approved a refill of this patient's medication as requested +5 additional refills. Please call this prescription into the patient's pharmacy as requested. I will route this to nursing.   Thank you!!

## 2018-09-09 RX ORDER — CELECOXIB 200 MG/1
CAPSULE ORAL
Qty: 90 CAPSULE | Refills: 3 | OUTPATIENT
Start: 2018-09-09

## 2018-09-09 RX ORDER — CELECOXIB 200 MG/1
200 CAPSULE ORAL DAILY
Qty: 90 CAPSULE | Refills: 3 | Status: SHIPPED | OUTPATIENT
Start: 2018-09-09 | End: 2019-09-05

## 2018-09-09 NOTE — TELEPHONE ENCOUNTER
Dr TATUM please review as it appears we were not original prescriber, will need your approval, thanks

## 2018-09-11 NOTE — TELEPHONE ENCOUNTER
To MD:  The above refill request is for a controlled substance. Please indicate yes or no to refill 30 days supply plus one refill.   If more refills are appropriate, please indicate quantity       Last refilled - 3/14/2018 #90/1

## 2018-09-13 RX ORDER — ZOLPIDEM TARTRATE 10 MG/1
TABLET ORAL
Qty: 90 TABLET | Refills: 1 | Status: SHIPPED
Start: 2018-09-13 | End: 2019-03-08

## 2018-09-14 NOTE — TELEPHONE ENCOUNTER
Nurse faxed prescription to pharmacy at [address] [place] [address] in [place] at [phone] as indicated by MD in message below.

## 2018-09-14 NOTE — TELEPHONE ENCOUNTER
Noted..  I have printed out and signed a prescription that can be either called in or faxed to the patient's pharmacist.  I have left the prescription on my nurses desk.   The prescription for zolpidem 10 mg #90 which can be taken as follows 1 tablet at bed

## 2018-09-19 NOTE — TELEPHONE ENCOUNTER
Pharmacist called back regarding Celebrex RX - never received it - was sent 9/9 /2018 for # 90 with 3 refills - take 1 capsule daily - verbalized understanding

## 2018-09-25 RX ORDER — SPIRONOLACTONE 25 MG/1
TABLET ORAL
Qty: 90 TABLET | Refills: 3 | Status: SHIPPED | OUTPATIENT
Start: 2018-09-25 | End: 2019-08-15

## 2018-09-25 RX ORDER — AMLODIPINE BESYLATE 10 MG/1
TABLET ORAL
Qty: 90 TABLET | Refills: 3 | Status: SHIPPED | OUTPATIENT
Start: 2018-09-25 | End: 2019-09-29

## 2018-11-01 RX ORDER — METOPROLOL SUCCINATE 100 MG/1
TABLET, EXTENDED RELEASE ORAL
Qty: 90 TABLET | Refills: 3 | Status: SHIPPED | OUTPATIENT
Start: 2018-11-01 | End: 2018-11-08

## 2018-11-05 ENCOUNTER — LAB ENCOUNTER (OUTPATIENT)
Dept: LAB | Age: 78
End: 2018-11-05
Attending: INTERNAL MEDICINE
Payer: MEDICARE

## 2018-11-05 DIAGNOSIS — E78.1 HYPERTRIGLYCERIDEMIA: ICD-10-CM

## 2018-11-05 DIAGNOSIS — E11.9 TYPE 2 DIABETES MELLITUS WITHOUT COMPLICATION, WITHOUT LONG-TERM CURRENT USE OF INSULIN (HCC): ICD-10-CM

## 2018-11-05 DIAGNOSIS — Z00.00 ANNUAL PHYSICAL EXAM: ICD-10-CM

## 2018-11-05 DIAGNOSIS — Z12.5 PROSTATE CANCER SCREENING: ICD-10-CM

## 2018-11-05 DIAGNOSIS — E78.00 HYPERCHOLESTEREMIA: ICD-10-CM

## 2018-11-05 DIAGNOSIS — R53.83 FATIGUE, UNSPECIFIED TYPE: ICD-10-CM

## 2018-11-05 PROCEDURE — 85025 COMPLETE CBC W/AUTO DIFF WBC: CPT

## 2018-11-05 PROCEDURE — 83036 HEMOGLOBIN GLYCOSYLATED A1C: CPT

## 2018-11-05 PROCEDURE — 80053 COMPREHEN METABOLIC PANEL: CPT

## 2018-11-05 PROCEDURE — 36415 COLL VENOUS BLD VENIPUNCTURE: CPT

## 2018-11-05 PROCEDURE — 81003 URINALYSIS AUTO W/O SCOPE: CPT

## 2018-11-05 PROCEDURE — 84443 ASSAY THYROID STIM HORMONE: CPT

## 2018-11-05 PROCEDURE — 80061 LIPID PANEL: CPT

## 2018-11-08 ENCOUNTER — OFFICE VISIT (OUTPATIENT)
Dept: INTERNAL MEDICINE CLINIC | Facility: CLINIC | Age: 78
End: 2018-11-08
Payer: MEDICARE

## 2018-11-08 VITALS
TEMPERATURE: 98 F | BODY MASS INDEX: 32.56 KG/M2 | SYSTOLIC BLOOD PRESSURE: 124 MMHG | OXYGEN SATURATION: 96 % | HEIGHT: 66.5 IN | DIASTOLIC BLOOD PRESSURE: 70 MMHG | HEART RATE: 60 BPM | WEIGHT: 205 LBS

## 2018-11-08 DIAGNOSIS — K58.9 IRRITABLE BOWEL SYNDROME WITHOUT DIARRHEA: ICD-10-CM

## 2018-11-08 DIAGNOSIS — E78.1 HYPERTRIGLYCERIDEMIA: ICD-10-CM

## 2018-11-08 DIAGNOSIS — N18.30 STAGE 3 CHRONIC KIDNEY DISEASE (HCC): ICD-10-CM

## 2018-11-08 DIAGNOSIS — I10 ESSENTIAL HYPERTENSION: ICD-10-CM

## 2018-11-08 DIAGNOSIS — I34.0 MITRAL VALVE INSUFFICIENCY, UNSPECIFIED ETIOLOGY: ICD-10-CM

## 2018-11-08 DIAGNOSIS — M54.50 ACUTE BILATERAL LOW BACK PAIN WITHOUT SCIATICA: ICD-10-CM

## 2018-11-08 DIAGNOSIS — Z00.00 ANNUAL PHYSICAL EXAM: Primary | ICD-10-CM

## 2018-11-08 DIAGNOSIS — E78.00 HYPERCHOLESTEREMIA: ICD-10-CM

## 2018-11-08 DIAGNOSIS — G62.9 PERIPHERAL POLYNEUROPATHY: ICD-10-CM

## 2018-11-08 DIAGNOSIS — R53.83 FATIGUE, UNSPECIFIED TYPE: ICD-10-CM

## 2018-11-08 DIAGNOSIS — R97.20 ELEVATED PSA: ICD-10-CM

## 2018-11-08 DIAGNOSIS — M15.9 PRIMARY OSTEOARTHRITIS INVOLVING MULTIPLE JOINTS: ICD-10-CM

## 2018-11-08 DIAGNOSIS — I25.10 ASHD (ARTERIOSCLEROTIC HEART DISEASE): ICD-10-CM

## 2018-11-08 DIAGNOSIS — E11.9 TYPE 2 DIABETES MELLITUS WITHOUT COMPLICATION, WITHOUT LONG-TERM CURRENT USE OF INSULIN (HCC): ICD-10-CM

## 2018-11-08 DIAGNOSIS — R94.31 ABNORMAL EKG: ICD-10-CM

## 2018-11-08 DIAGNOSIS — N40.1 BENIGN NON-NODULAR PROSTATIC HYPERPLASIA WITH LOWER URINARY TRACT SYMPTOMS: ICD-10-CM

## 2018-11-08 DIAGNOSIS — K21.9 GASTROESOPHAGEAL REFLUX DISEASE, ESOPHAGITIS PRESENCE NOT SPECIFIED: ICD-10-CM

## 2018-11-08 PROCEDURE — 93005 ELECTROCARDIOGRAM TRACING: CPT | Performed by: INTERNAL MEDICINE

## 2018-11-08 PROCEDURE — G0439 PPPS, SUBSEQ VISIT: HCPCS | Performed by: INTERNAL MEDICINE

## 2018-11-08 PROCEDURE — G0463 HOSPITAL OUTPT CLINIC VISIT: HCPCS | Performed by: INTERNAL MEDICINE

## 2018-11-08 PROCEDURE — 93010 ELECTROCARDIOGRAM REPORT: CPT | Performed by: INTERNAL MEDICINE

## 2018-11-08 PROCEDURE — 82272 OCCULT BLD FECES 1-3 TESTS: CPT | Performed by: INTERNAL MEDICINE

## 2018-11-08 PROCEDURE — 99214 OFFICE O/P EST MOD 30 MIN: CPT | Performed by: INTERNAL MEDICINE

## 2018-11-08 RX ORDER — METOPROLOL SUCCINATE 25 MG/1
25 TABLET, EXTENDED RELEASE ORAL
Refills: 3 | COMMUNITY
Start: 2018-09-18 | End: 2021-06-10

## 2018-11-08 NOTE — PATIENT INSTRUCTIONS
1.  Patient is to continue his current diet, medication and activity. 2.  Patient to follow-up with his orthopedic physician, Dr. Toma Pantoja, regarding his right shoulder pain as he is doing.   3.  I will place an order in the system for the patient get x-rays

## 2018-11-09 NOTE — PROGRESS NOTES
Mirta Robles is a 77year old male who presents for a complete physical exam.   HPI:   Mr. Odie Holstein causes a 50-year-old white male who was seen by me on November 8, 2018 for his Medicare annual physical examination.   At the time of the examination Mr. Chanda Vee MG Oral Tab TAKE 1 TABLET TWICE A DAY  WITH MEALS Disp: 180 tablet Rfl: 3   LORazepam 0.5 MG Oral Tab Take 1 tablet (0.5 mg total) by mouth 2 (two) times daily as needed for Anxiety.  Disp: 30 tablet Rfl: 5   Coenzyme Q10 (CO Q-10) 200 MG Oral Cap Take 1 ta Used    Alcohol use: Yes      Comment: 2-3 drinks/month    Drug use: No          REVIEW OF SYSTEMS:   GENERAL: feels well   EYES:denies blurred vision or double vision  HEENT: denies nasal congestion, sinus pain or ST  LUNGS: denies shortness of breath or Patient's lipid panel had a cholesterol 130, triglycerides are 73, HDL cholesterol was 26 and LDL cholesterol was 90. Patient's TSH was 1.59. His PSA was 4.4. ASSESSMENT AND PLAN:   1.  Annual physical exam  Patient appeared to be doing well other than Future  - ALT (SGPT); Future    8. Irritable bowel syndrome without diarrhea  Stable. CPM.    9. Gastroesophageal reflux disease, esophagitis presence not specified  Stable.   CPM.    10. Benign non-nodular prostatic hyperplasia with lower urinary tract sy 10 pounds without trying?: 2 - No    Has your appetite been poor?: No    Type of Diet: Low Salt(low carb)    How does the patient maintain a good energy level?: Other    How would you describe your daily physical activity?: Light    How would you describe Do you have a healthcare power of ?: Yes    Do you have a living will?: Yes     Hearing Assessment (Required for AWV/SWV)      Hearing Screening    Time taken:  11/8/2018  9:51 AM  Entry User:  David Arrington RN  Screening Method:  Tiffanie Mcdonough your insurance carrier before scheduling to verify coverage.     PREVENTATIVE SERVICES  INDICATIONS AND SCHEDULE Internal Lab or Procedure External Lab or Procedure   Diabetes Screening      HbgA1C   Annually HgbA1C (%)   Date Value   11/05/2018 6.8 (H) MONITORING Internal Lab or Procedure External Lab or Procedure   Annual Monitoring of Persistent     Medications (ACE/ARB, digoxin, diuretics)    Potassium  Annually Potassium (mmol/L)   Date Value   11/05/2018 4.5     POTASSIUM (P) (mmol/L)   Date Value

## 2018-11-14 ENCOUNTER — HOSPITAL ENCOUNTER (OUTPATIENT)
Dept: GENERAL RADIOLOGY | Age: 78
Discharge: HOME | End: 2018-11-14
Attending: INTERNAL MEDICINE
Payer: MEDICARE

## 2018-11-14 DIAGNOSIS — M54.50 ACUTE BILATERAL LOW BACK PAIN WITHOUT SCIATICA: ICD-10-CM

## 2018-11-14 PROCEDURE — 72110 X-RAY EXAM L-2 SPINE 4/>VWS: CPT | Performed by: INTERNAL MEDICINE

## 2018-11-14 PROCEDURE — 72170 X-RAY EXAM OF PELVIS: CPT | Performed by: INTERNAL MEDICINE

## 2018-11-27 ENCOUNTER — TELEPHONE (OUTPATIENT)
Dept: INTERNAL MEDICINE CLINIC | Facility: CLINIC | Age: 78
End: 2018-11-27

## 2018-11-27 NOTE — TELEPHONE ENCOUNTER
Pt spouse Shannon Coughlin calling for x-ray of spine & pelvis results done on 11/14 in lab downstairs, please call [phone]    Tasked to nursing

## 2018-11-30 NOTE — TELEPHONE ENCOUNTER
Telephone call to patient and situation discussed. Patient is x-rays of his lumbar spine show him to have much arthritis and degenerative changes of lumbar spine but no fracture.   Patient's pelvis fracture appeared normal.  I feel patient would benefit by

## 2018-11-30 NOTE — TELEPHONE ENCOUNTER
Called spouse per hipaa and gave address and # for DR. Stephanie Belcher [phone] and DR. Patel # [phone] at [facility] - verbalized understanding

## 2018-12-12 ENCOUNTER — MYAURORA ACCOUNT LINK (OUTPATIENT)
Dept: OTHER | Age: 78
End: 2018-12-12

## 2018-12-12 ENCOUNTER — PRIOR ORIGINAL RECORDS (OUTPATIENT)
Dept: OTHER | Age: 78
End: 2018-12-12

## 2018-12-17 ENCOUNTER — TELEPHONE (OUTPATIENT)
Dept: INTERNAL MEDICINE CLINIC | Facility: CLINIC | Age: 78
End: 2018-12-17

## 2018-12-17 NOTE — TELEPHONE ENCOUNTER
[facility] sent form requesting Prior Authorization for:  Celocoxib cap 200mg   Form placed in purple folder  Tasked to [employer]

## 2019-01-10 ENCOUNTER — TELEPHONE (OUTPATIENT)
Dept: NEUROLOGY | Facility: CLINIC | Age: 79
End: 2019-01-10

## 2019-01-10 RX ORDER — DULOXETIN HYDROCHLORIDE 30 MG/1
30 CAPSULE, DELAYED RELEASE ORAL
Qty: 30 CAPSULE | Refills: 0 | Status: SHIPPED | OUTPATIENT
Start: 2019-01-10 | End: 2019-01-14

## 2019-01-10 RX ORDER — DULOXETIN HYDROCHLORIDE 30 MG/1
30 CAPSULE, DELAYED RELEASE ORAL
Refills: 0 | COMMUNITY
Start: 2018-12-14 | End: 2019-01-10

## 2019-01-10 NOTE — TELEPHONE ENCOUNTER
Refill request for duloxetine 30 mg, take 1 cap daily, #30, no refills    Last refilled on 12/14/18 per pharmacy   NOV: 1/14/19

## 2019-01-14 ENCOUNTER — OFFICE VISIT (OUTPATIENT)
Dept: NEUROLOGY | Facility: CLINIC | Age: 79
End: 2019-01-14
Payer: MEDICARE

## 2019-01-14 VITALS
RESPIRATION RATE: 16 BRPM | HEIGHT: 68 IN | SYSTOLIC BLOOD PRESSURE: 110 MMHG | WEIGHT: 195 LBS | BODY MASS INDEX: 29.55 KG/M2 | HEART RATE: 84 BPM | DIASTOLIC BLOOD PRESSURE: 64 MMHG

## 2019-01-14 DIAGNOSIS — G60.9 HEREDITARY AND IDIOPATHIC PERIPHERAL NEUROPATHY: ICD-10-CM

## 2019-01-14 DIAGNOSIS — E11.9 DIABETES MELLITUS TYPE 2 WITHOUT RETINOPATHY (HCC): ICD-10-CM

## 2019-01-14 DIAGNOSIS — I25.10 ATHEROSCLEROSIS OF NATIVE CORONARY ARTERY OF NATIVE HEART WITHOUT ANGINA PECTORIS: ICD-10-CM

## 2019-01-14 DIAGNOSIS — K21.9 GASTROESOPHAGEAL REFLUX DISEASE, ESOPHAGITIS PRESENCE NOT SPECIFIED: ICD-10-CM

## 2019-01-14 DIAGNOSIS — M15.9 GENERALIZED OSTEOARTHROSIS: ICD-10-CM

## 2019-01-14 DIAGNOSIS — M96.1 POSTLAMINECTOMY SYNDROME OF LUMBAR REGION: Primary | ICD-10-CM

## 2019-01-14 PROCEDURE — 99214 OFFICE O/P EST MOD 30 MIN: CPT | Performed by: PHYSICAL MEDICINE & REHABILITATION

## 2019-01-14 RX ORDER — DULOXETIN HYDROCHLORIDE 60 MG/1
60 CAPSULE, DELAYED RELEASE ORAL
Qty: 30 CAPSULE | Refills: 0 | Status: SHIPPED | OUTPATIENT
Start: 2019-01-14 | End: 2019-02-11

## 2019-01-14 NOTE — PROGRESS NOTES
[address]  Progress Note    CHIEF COMPLAINT:  Patient presents with:  Low Back Pain: Patient presents for follow up on low back pain, has been doing well since last office visit on 12/14/18, still doing PT Occupational History      Not on file    Tobacco Use      Smoking status: Never Smoker      Smokeless tobacco: Never Used    Substance and Sexual Activity      Alcohol use: Yes        Comment: 2-3 drinks/month, Occasionally      Drug use: No      Sexual ac tablet by mouth daily.  Disp:  Rfl:    GLIMEPIRIDE 2 MG Oral Tab TAKE 1 TABLET DAILY WITH   BREAKFAST Disp: 90 tablet Rfl: 3       ALLERGIES:     Ace Inhibitors          Coughing    Comment:Other reaction(s): Cough    REVIEW OF SYSTEMS:   Review of Systems 133* 136 - 144 mmol/L Final   • Potassium 11/05/2018 4.5  3.3 - 5.1 mmol/L Final   • Chloride 11/05/2018 100  95 - 110 mmol/L Final   • CO2 11/05/2018 23  22 - 32 mmol/L Final   • BUN 11/05/2018 20  8 - 20 mg/dL Final   • Creatinine 11/05/2018 1.13  0.50 - Blood Urine 11/05/2018 Negative  Negative Final   • Nitrite Urine 11/05/2018 Negative  Negative Final   • Urobilinogen Urine 11/05/2018 <2.0  <2.0 Final   • Leukocyte Esterase Urine 11/05/2018 Negative  Negative Final   • Ascorbic Acid Urine 11/05/2018 40 diabetes    3. Atherosclerosis of native coronary artery of native heart without angina pectoris  No NSAIDs due to 2 CAD. 4. Hereditary and idiopathic peripheral neuropathy  Exhibited poor balance in the exam room today. Continue physical therapy    5.

## 2019-01-16 ENCOUNTER — MED REC SCAN ONLY (OUTPATIENT)
Dept: NEUROLOGY | Facility: CLINIC | Age: 79
End: 2019-01-16

## 2019-02-05 ENCOUNTER — LAB ENCOUNTER (OUTPATIENT)
Dept: LAB | Age: 79
End: 2019-02-05
Attending: INTERNAL MEDICINE
Payer: MEDICARE

## 2019-02-05 DIAGNOSIS — N18.30 STAGE 3 CHRONIC KIDNEY DISEASE (HCC): ICD-10-CM

## 2019-02-05 DIAGNOSIS — E78.00 HYPERCHOLESTEREMIA: ICD-10-CM

## 2019-02-05 DIAGNOSIS — E11.9 TYPE 2 DIABETES MELLITUS WITHOUT COMPLICATION, WITHOUT LONG-TERM CURRENT USE OF INSULIN (HCC): ICD-10-CM

## 2019-02-05 DIAGNOSIS — R53.83 FATIGUE, UNSPECIFIED TYPE: ICD-10-CM

## 2019-02-05 DIAGNOSIS — E78.1 HYPERTRIGLYCERIDEMIA: ICD-10-CM

## 2019-02-05 LAB
ALT SERPL-CCNC: 27 U/L (ref 17–63)
ANION GAP SERPL CALC-SCNC: 10 MMOL/L (ref 0–18)
AST SERPL-CCNC: 29 U/L (ref 15–41)
BASOPHILS # BLD AUTO: 0.01 X10(3) UL (ref 0–0.2)
BASOPHILS NFR BLD AUTO: 0.1 %
BUN SERPL-MCNC: 15 MG/DL (ref 8–20)
BUN/CREAT SERPL: 14 (ref 10–20)
CALCIUM SERPL-MCNC: 9.6 MG/DL (ref 8.5–10.5)
CHLORIDE SERPL-SCNC: 103 MMOL/L (ref 95–110)
CHOLEST SERPL-MCNC: 140 MG/DL (ref 110–200)
CO2 SERPL-SCNC: 26 MMOL/L (ref 22–32)
CREAT SERPL-MCNC: 1.07 MG/DL (ref 0.5–1.5)
DEPRECATED RDW RBC AUTO: 43.1 FL (ref 35.1–46.3)
EOSINOPHIL # BLD AUTO: 0.18 X10(3) UL (ref 0–0.7)
EOSINOPHIL NFR BLD AUTO: 2.7 %
ERYTHROCYTE [DISTWIDTH] IN BLOOD BY AUTOMATED COUNT: 13.2 % (ref 11–15)
EST. AVERAGE GLUCOSE BLD GHB EST-MCNC: 128 MG/DL (ref 68–126)
GLUCOSE SERPL-MCNC: 100 MG/DL (ref 70–99)
HBA1C MFR BLD HPLC: 6.1 % (ref ?–5.7)
HCT VFR BLD AUTO: 43.9 % (ref 39–53)
HDLC SERPL-MCNC: 26 MG/DL
HGB BLD-MCNC: 15.2 G/DL (ref 13–17.5)
IMM GRANULOCYTES # BLD AUTO: 0.02 X10(3) UL (ref 0–1)
IMM GRANULOCYTES NFR BLD: 0.3 %
LDLC SERPL CALC-MCNC: 98 MG/DL (ref 0–99)
LYMPHOCYTES # BLD AUTO: 1.83 X10(3) UL (ref 1–4)
LYMPHOCYTES NFR BLD AUTO: 27.4 %
MCH RBC QN AUTO: 31 PG (ref 26–34)
MCHC RBC AUTO-ENTMCNC: 34.6 G/DL (ref 31–37)
MCV RBC AUTO: 89.4 FL (ref 80–100)
MONOCYTES # BLD AUTO: 0.58 X10(3) UL (ref 0.1–1)
MONOCYTES NFR BLD AUTO: 8.7 %
NEUTROPHILS # BLD AUTO: 4.07 X10 (3) UL (ref 1.5–7.7)
NEUTROPHILS # BLD AUTO: 4.07 X10(3) UL (ref 1.5–7.7)
NEUTROPHILS NFR BLD AUTO: 60.8 %
NONHDLC SERPL-MCNC: 114 MG/DL
OSMOLALITY UR CALC.SUM OF ELEC: 289 MOSM/KG (ref 275–295)
PLATELET # BLD AUTO: 183 10(3)UL (ref 150–450)
POTASSIUM SERPL-SCNC: 4.1 MMOL/L (ref 3.3–5.1)
RBC # BLD AUTO: 4.91 X10(6)UL (ref 3.8–5.8)
SODIUM SERPL-SCNC: 139 MMOL/L (ref 136–144)
TRIGL SERPL-MCNC: 80 MG/DL (ref 1–149)
WBC # BLD AUTO: 6.7 X10(3) UL (ref 4–11)

## 2019-02-05 PROCEDURE — 84460 ALANINE AMINO (ALT) (SGPT): CPT

## 2019-02-05 PROCEDURE — 84450 TRANSFERASE (AST) (SGOT): CPT

## 2019-02-05 PROCEDURE — 80061 LIPID PANEL: CPT

## 2019-02-05 PROCEDURE — 36415 COLL VENOUS BLD VENIPUNCTURE: CPT

## 2019-02-05 PROCEDURE — 83036 HEMOGLOBIN GLYCOSYLATED A1C: CPT

## 2019-02-05 PROCEDURE — 80048 BASIC METABOLIC PNL TOTAL CA: CPT

## 2019-02-05 PROCEDURE — 85025 COMPLETE CBC W/AUTO DIFF WBC: CPT

## 2019-02-07 ENCOUNTER — OFFICE VISIT (OUTPATIENT)
Dept: INTERNAL MEDICINE CLINIC | Facility: CLINIC | Age: 79
End: 2019-02-07
Payer: MEDICARE

## 2019-02-07 VITALS
DIASTOLIC BLOOD PRESSURE: 64 MMHG | TEMPERATURE: 97 F | SYSTOLIC BLOOD PRESSURE: 130 MMHG | WEIGHT: 197.19 LBS | OXYGEN SATURATION: 95 % | BODY MASS INDEX: 30.23 KG/M2 | HEIGHT: 67.6 IN | HEART RATE: 72 BPM

## 2019-02-07 DIAGNOSIS — N18.30 STAGE 3 CHRONIC KIDNEY DISEASE (HCC): ICD-10-CM

## 2019-02-07 DIAGNOSIS — I25.10 ASHD (ARTERIOSCLEROTIC HEART DISEASE): Primary | ICD-10-CM

## 2019-02-07 DIAGNOSIS — I34.0 MITRAL VALVE INSUFFICIENCY, UNSPECIFIED ETIOLOGY: ICD-10-CM

## 2019-02-07 DIAGNOSIS — R53.83 FATIGUE, UNSPECIFIED TYPE: ICD-10-CM

## 2019-02-07 DIAGNOSIS — I10 ESSENTIAL HYPERTENSION: ICD-10-CM

## 2019-02-07 DIAGNOSIS — K58.9 IRRITABLE BOWEL SYNDROME WITHOUT DIARRHEA: ICD-10-CM

## 2019-02-07 DIAGNOSIS — G62.9 PERIPHERAL POLYNEUROPATHY: ICD-10-CM

## 2019-02-07 DIAGNOSIS — M15.9 PRIMARY OSTEOARTHRITIS INVOLVING MULTIPLE JOINTS: ICD-10-CM

## 2019-02-07 DIAGNOSIS — E78.00 HYPERCHOLESTEREMIA: ICD-10-CM

## 2019-02-07 DIAGNOSIS — N40.1 BENIGN NON-NODULAR PROSTATIC HYPERPLASIA WITH LOWER URINARY TRACT SYMPTOMS: ICD-10-CM

## 2019-02-07 DIAGNOSIS — E11.9 TYPE 2 DIABETES MELLITUS WITHOUT COMPLICATION, WITHOUT LONG-TERM CURRENT USE OF INSULIN (HCC): ICD-10-CM

## 2019-02-07 DIAGNOSIS — K21.9 GASTROESOPHAGEAL REFLUX DISEASE, ESOPHAGITIS PRESENCE NOT SPECIFIED: ICD-10-CM

## 2019-02-07 DIAGNOSIS — E78.1 HYPERTRIGLYCERIDEMIA: ICD-10-CM

## 2019-02-07 PROCEDURE — 99214 OFFICE O/P EST MOD 30 MIN: CPT | Performed by: INTERNAL MEDICINE

## 2019-02-07 PROCEDURE — G0463 HOSPITAL OUTPT CLINIC VISIT: HCPCS | Performed by: INTERNAL MEDICINE

## 2019-02-07 NOTE — PROGRESS NOTES
Orelia Osgood is a 77year old male. Patient presents with:  Checkup: 3 month - Pt has started taking Cymbalta prescribed by  Dr. Thelbert Apgar prescribed -  Pt is seeing Dr. Thelbert Apgar on Monday.    Ashd  Hypertension  Hyperlipidemia  Diabetes    HPI:   Patient prese Rfl: 3   METFORMIN HCL 1000 MG Oral Tab TAKE 1 TABLET TWICE A DAY  WITH MEALS Disp: 180 tablet Rfl: 3   LORazepam 0.5 MG Oral Tab Take 1 tablet (0.5 mg total) by mouth 2 (two) times daily as needed for Anxiety.  Disp: 30 tablet Rfl: 5   Coenzyme Q10 (CO Q-1 constipation  :No Urinary complaints  EXT:No complaints of pain or swelling in patient's legs    EXAM:   /64 (BP Location: Left arm, Patient Position: Sitting, Cuff Size: adult)   Pulse 72   Temp 97.3 °F (36.3 °C) (Oral)   Ht 5' 7.6\" (1.717 m)   W noted above. 7. Irritable bowel syndrome without diarrhea  Stable. CPM.    8. Gastroesophageal reflux disease, esophagitis presence not specified  Stable. CPM.    9. Stage 3 chronic kidney disease (HCC)  Stable.   CPM.  Patient's recent BUN was 15, cre

## 2019-02-07 NOTE — PATIENT INSTRUCTIONS
1.  Patient is to continue his current diet, medication and activity. 2.  Patient is to continue to follow-up with Dr. Author Harry as he is doing.   3.  I will plan see the patient back in 3 months with blood tests which will include a BMP, hemoglobin A1c,

## 2019-02-11 ENCOUNTER — TELEPHONE (OUTPATIENT)
Dept: NEUROLOGY | Facility: CLINIC | Age: 79
End: 2019-02-11

## 2019-02-11 ENCOUNTER — OFFICE VISIT (OUTPATIENT)
Dept: NEUROLOGY | Facility: CLINIC | Age: 79
End: 2019-02-11
Payer: MEDICARE

## 2019-02-11 VITALS
HEART RATE: 70 BPM | WEIGHT: 194 LBS | HEIGHT: 68 IN | SYSTOLIC BLOOD PRESSURE: 118 MMHG | BODY MASS INDEX: 29.4 KG/M2 | DIASTOLIC BLOOD PRESSURE: 66 MMHG

## 2019-02-11 DIAGNOSIS — I25.10 ATHEROSCLEROSIS OF NATIVE CORONARY ARTERY OF NATIVE HEART WITHOUT ANGINA PECTORIS: ICD-10-CM

## 2019-02-11 DIAGNOSIS — G60.9 HEREDITARY AND IDIOPATHIC PERIPHERAL NEUROPATHY: ICD-10-CM

## 2019-02-11 DIAGNOSIS — M96.1 POSTLAMINECTOMY SYNDROME OF LUMBAR REGION: Primary | ICD-10-CM

## 2019-02-11 DIAGNOSIS — M15.9 GENERALIZED OSTEOARTHROSIS: ICD-10-CM

## 2019-02-11 DIAGNOSIS — K21.9 GASTROESOPHAGEAL REFLUX DISEASE, ESOPHAGITIS PRESENCE NOT SPECIFIED: ICD-10-CM

## 2019-02-11 DIAGNOSIS — E11.9 DIABETES MELLITUS TYPE 2 WITHOUT RETINOPATHY (HCC): ICD-10-CM

## 2019-02-11 PROCEDURE — 99214 OFFICE O/P EST MOD 30 MIN: CPT | Performed by: PHYSICAL MEDICINE & REHABILITATION

## 2019-02-11 RX ORDER — DULOXETIN HYDROCHLORIDE 30 MG/1
30 CAPSULE, DELAYED RELEASE ORAL
Qty: 90 CAPSULE | Refills: 1 | Status: SHIPPED | OUTPATIENT
Start: 2019-02-11 | End: 2019-08-10

## 2019-02-11 NOTE — PROGRESS NOTES
[address]  Progress Note    CHIEF COMPLAINT:  Patient presents with:  Low Back Pain: LOV 1-14-19 pt is here to f/u lower back pain no pain today, pain is worsen went moving a lot.  currently taking Celebrex drinks/month, Occasionally      Drug use: No      Sexual activity: Not on file      FAMILY HISTORY:   Family History   Problem Relation Age of Onset   • Heart Disease Father    • Stroke Mother    • Pulmonary Disease Brother    • Diabetes Brother    • Solomon Palmer reaction(s): Cough    REVIEW OF SYSTEMS:   Review of Systems   Constitutional: Negative. Cardiovascular: Negative. Gastrointestinal: Negative. Genitourinary: Negative. Psychiatric/Behavioral: Negative.       All other systems reviewed and are 2019). Discharge Instructions were provided as documented in AVS summary. The patient was in agreement with the assessment and plan. All questions were answered. There were no barriers to learning.         Di Apgar, MD  Physical Medicine

## 2019-02-14 ENCOUNTER — MED REC SCAN ONLY (OUTPATIENT)
Dept: NEUROLOGY | Facility: CLINIC | Age: 79
End: 2019-02-14

## 2019-02-20 RX ORDER — LORAZEPAM 0.5 MG/1
TABLET ORAL
Qty: 30 TABLET | Refills: 5 | Status: SHIPPED
Start: 2019-02-20 | End: 2019-08-15

## 2019-02-20 NOTE — TELEPHONE ENCOUNTER
Noted.  I have printed out and signed a new prescription for the patient's medication which can be faxed to his pharmacy. Please fax the prescription to his pharmacy. I will route this to nursing.   Thank you!!

## 2019-02-25 RX ORDER — GLIMEPIRIDE 2 MG/1
TABLET ORAL
Qty: 90 TABLET | Refills: 3 | Status: SHIPPED | OUTPATIENT
Start: 2019-02-25 | End: 2020-02-11

## 2019-02-28 VITALS
WEIGHT: 203 LBS | HEIGHT: 67 IN | HEART RATE: 73 BPM | RESPIRATION RATE: 18 BRPM | SYSTOLIC BLOOD PRESSURE: 134 MMHG | BODY MASS INDEX: 31.86 KG/M2 | DIASTOLIC BLOOD PRESSURE: 70 MMHG

## 2019-02-28 VITALS
DIASTOLIC BLOOD PRESSURE: 56 MMHG | WEIGHT: 199 LBS | HEIGHT: 67 IN | HEART RATE: 56 BPM | OXYGEN SATURATION: 95 % | RESPIRATION RATE: 18 BRPM | BODY MASS INDEX: 31.23 KG/M2 | SYSTOLIC BLOOD PRESSURE: 122 MMHG

## 2019-02-28 VITALS
BODY MASS INDEX: 30.62 KG/M2 | RESPIRATION RATE: 18 BRPM | HEIGHT: 68 IN | HEART RATE: 59 BPM | SYSTOLIC BLOOD PRESSURE: 132 MMHG | DIASTOLIC BLOOD PRESSURE: 70 MMHG | WEIGHT: 202 LBS

## 2019-02-28 VITALS
HEART RATE: 63 BPM | SYSTOLIC BLOOD PRESSURE: 104 MMHG | DIASTOLIC BLOOD PRESSURE: 62 MMHG | WEIGHT: 201 LBS | BODY MASS INDEX: 31.55 KG/M2 | HEIGHT: 67 IN | RESPIRATION RATE: 18 BRPM

## 2019-02-28 VITALS
HEIGHT: 67 IN | WEIGHT: 204 LBS | BODY MASS INDEX: 32.02 KG/M2 | HEART RATE: 53 BPM | DIASTOLIC BLOOD PRESSURE: 68 MMHG | RESPIRATION RATE: 18 BRPM | SYSTOLIC BLOOD PRESSURE: 116 MMHG

## 2019-02-28 VITALS
BODY MASS INDEX: 31.07 KG/M2 | HEART RATE: 67 BPM | HEIGHT: 68 IN | SYSTOLIC BLOOD PRESSURE: 126 MMHG | WEIGHT: 205 LBS | RESPIRATION RATE: 18 BRPM | DIASTOLIC BLOOD PRESSURE: 56 MMHG

## 2019-02-28 VITALS
OXYGEN SATURATION: 97 % | BODY MASS INDEX: 32.18 KG/M2 | RESPIRATION RATE: 20 BRPM | DIASTOLIC BLOOD PRESSURE: 60 MMHG | HEART RATE: 49 BPM | SYSTOLIC BLOOD PRESSURE: 110 MMHG | HEIGHT: 67 IN | WEIGHT: 205 LBS

## 2019-02-28 VITALS
BODY MASS INDEX: 32.33 KG/M2 | WEIGHT: 206 LBS | HEART RATE: 65 BPM | DIASTOLIC BLOOD PRESSURE: 70 MMHG | SYSTOLIC BLOOD PRESSURE: 126 MMHG | HEIGHT: 67 IN | OXYGEN SATURATION: 94 %

## 2019-03-08 RX ORDER — ZOLPIDEM TARTRATE 10 MG/1
TABLET ORAL
Qty: 90 TABLET | Refills: 1 | Status: SHIPPED
Start: 2019-03-08 | End: 2019-09-05

## 2019-03-09 NOTE — TELEPHONE ENCOUNTER
Noted.  I have printed out and signed a refill prescription for the patient's medication as requested. This can be faxed to the patient's pharmacy on Monday. I will route this message to nursing who can fax us on Monday.   Thank you!!

## 2019-03-14 ENCOUNTER — OFFICE VISIT (OUTPATIENT)
Dept: OPHTHALMOLOGY | Facility: CLINIC | Age: 79
End: 2019-03-14
Payer: MEDICARE

## 2019-03-14 DIAGNOSIS — H25.13 AGE-RELATED NUCLEAR CATARACT OF BOTH EYES: ICD-10-CM

## 2019-03-14 DIAGNOSIS — E11.9 DIABETES MELLITUS TYPE 2 WITHOUT RETINOPATHY (HCC): Primary | ICD-10-CM

## 2019-03-14 DIAGNOSIS — H43.393 VITREOUS FLOATERS OF BOTH EYES: ICD-10-CM

## 2019-03-14 PROCEDURE — 92014 COMPRE OPH EXAM EST PT 1/>: CPT | Performed by: OPHTHALMOLOGY

## 2019-03-14 NOTE — PROGRESS NOTES
Isabel Paredes is a 77year old male.     HPI:     HPI     Diabetic Eye Exam      Additional comments: Pt has been a diabetic for 14 years  14 years on pills/  Never on Insulin   Pt does not check his BS at home  Pt's last blood sugar was 100 on 2/5/19  L MOUTH TWICE A DAY AS NEEDED FOR ANXIETY Disp: 30 tablet Rfl: 5   DULoxetine HCl 30 MG Oral Cap DR Particles Take 1 capsule (30 mg total) by mouth once daily.  Disp: 90 capsule Rfl: 1   Metoprolol Succinate ER 25 MG Oral Tablet 24 Hr Take 25 mg by mouth once Dermatochalasis, Meibomian gland dysfunction Dermatochalasis, Meibomian gland dysfunction    Conjunctiva/Sclera Normal Nasal pinguecula    Cornea Clear Clear    Anterior Chamber Deep and quiet Deep and quiet    Iris Normal Normal    Lens 2-3+ Nuclear scler or ordered in this encounter        Follow up instructions:  Return in about 1 year (around 3/14/2020) for Diabetic Eye Exam.    3/14/2019  Scribed by: Patsie Nageotte, MD

## 2019-03-14 NOTE — PATIENT INSTRUCTIONS
Diabetes mellitus type 2 without retinopathy ([facility] [address]  Diabetes type II: no background of retinopathy, no signs of neovascularization noted. Discussed ocular and systemic benefits of blood sugar control.   Diagnosis and treatment discussed in detail with clive

## 2019-03-29 ENCOUNTER — OFFICE VISIT (OUTPATIENT)
Dept: INTERNAL MEDICINE CLINIC | Facility: CLINIC | Age: 79
End: 2019-03-29
Payer: MEDICARE

## 2019-03-29 ENCOUNTER — TELEPHONE (OUTPATIENT)
Dept: INTERNAL MEDICINE CLINIC | Facility: CLINIC | Age: 79
End: 2019-03-29

## 2019-03-29 VITALS
TEMPERATURE: 98 F | WEIGHT: 196 LBS | HEART RATE: 76 BPM | BODY MASS INDEX: 29.7 KG/M2 | OXYGEN SATURATION: 99 % | HEIGHT: 68 IN | DIASTOLIC BLOOD PRESSURE: 60 MMHG | SYSTOLIC BLOOD PRESSURE: 130 MMHG

## 2019-03-29 DIAGNOSIS — I25.10 ASHD (ARTERIOSCLEROTIC HEART DISEASE): ICD-10-CM

## 2019-03-29 DIAGNOSIS — J06.9 UPPER RESPIRATORY TRACT INFECTION, UNSPECIFIED TYPE: ICD-10-CM

## 2019-03-29 DIAGNOSIS — E11.9 TYPE 2 DIABETES MELLITUS WITHOUT COMPLICATION, WITHOUT LONG-TERM CURRENT USE OF INSULIN (HCC): ICD-10-CM

## 2019-03-29 DIAGNOSIS — I10 ESSENTIAL HYPERTENSION: ICD-10-CM

## 2019-03-29 DIAGNOSIS — J01.90 ACUTE NON-RECURRENT SINUSITIS, UNSPECIFIED LOCATION: Primary | ICD-10-CM

## 2019-03-29 PROCEDURE — 99213 OFFICE O/P EST LOW 20 MIN: CPT | Performed by: INTERNAL MEDICINE

## 2019-03-29 PROCEDURE — G0463 HOSPITAL OUTPT CLINIC VISIT: HCPCS | Performed by: INTERNAL MEDICINE

## 2019-03-29 RX ORDER — AZITHROMYCIN 250 MG/1
TABLET, FILM COATED ORAL
Qty: 6 TABLET | Refills: 1 | Status: SHIPPED | OUTPATIENT
Start: 2019-03-29 | End: 2019-05-10 | Stop reason: ALTCHOICE

## 2019-03-29 NOTE — TELEPHONE ENCOUNTER
I spoke with patient's wife, OK per GERALDO. She says the patient has had a sore throat, runny nose, and heavy cough for two days. She is concerned about him going over the weekend with the cough. She says their daughter had a very bad cough recently.  Advise

## 2019-03-29 NOTE — TELEPHONE ENCOUNTER
Shaun Willis has a sore throat, dripping running nose, an cough starting yesterday she wants to know if he needs meds ph.  # [phone]  [facility] ph. # [phone]  Routed to clinical

## 2019-03-29 NOTE — PROGRESS NOTES
Fausto Todd is a 77year old male. Patient presents with:  Cough: Cough, sore throat and runny nose for two days. Feels very congested. HPI:   Patient presents with:  Cough: Cough, sore throat and runny nose for two days. Feels very congested. TAKE 1 CAPSULE DAILY Disp: 90 capsule Rfl: 3   Pantoprazole Sodium 40 MG Oral Tab EC Take 40 mg by mouth every morning before breakfast. Disp:  Rfl:    aspirin (ASPIRIN ADULT LOW STRENGTH) 81 MG Oral Tab EC Take 1 tablet by mouth daily.  Disp:  Rfl:       P oriented  ASSESSMENT AND PLAN:   There are no diagnoses linked to this encounter.     Acute non-recurrent sinusitis, unspecified location  (primary encounter diagnosis)  Upper respiratory tract infection, unspecified type  Essential hypertension  Ashd (domenic

## 2019-03-29 NOTE — PATIENT INSTRUCTIONS
1.  Patient is to continue his current diet, medication and activity. 2.  Patient to push fluids. 3.  Patient may use Zyrtec or Claritin as necessary for head congestion. 4.  Patient may use Tylenol for any aches or pains or low-grade fever.   5.  Sudhir

## 2019-04-03 RX ORDER — DUTASTERIDE 0.5 MG/1
CAPSULE, LIQUID FILLED ORAL
Qty: 90 CAPSULE | Refills: 3 | Status: SHIPPED | OUTPATIENT
Start: 2019-04-03 | End: 2020-03-16

## 2019-04-03 RX ORDER — TAMSULOSIN HYDROCHLORIDE 0.4 MG/1
CAPSULE ORAL
Qty: 90 CAPSULE | Refills: 3 | Status: SHIPPED | OUTPATIENT
Start: 2019-04-03 | End: 2020-03-16

## 2019-04-18 RX ORDER — CELECOXIB 200 MG/1
CAPSULE ORAL
COMMUNITY
Start: 2011-06-17

## 2019-04-18 RX ORDER — ZOLPIDEM TARTRATE 10 MG/1
TABLET ORAL
COMMUNITY
Start: 2011-05-18

## 2019-04-18 RX ORDER — SPIRONOLACTONE 25 MG/1
1 TABLET ORAL DAILY
COMMUNITY
Start: 2011-10-25 | End: 2019-12-04

## 2019-04-18 RX ORDER — DUTASTERIDE 0.5 MG/1
CAPSULE, LIQUID FILLED ORAL
COMMUNITY
Start: 2016-03-02

## 2019-04-18 RX ORDER — TAMSULOSIN HYDROCHLORIDE 0.4 MG/1
CAPSULE ORAL
COMMUNITY
Start: 2016-03-02

## 2019-04-18 RX ORDER — AMLODIPINE BESYLATE 10 MG/1
1 TABLET ORAL DAILY
COMMUNITY
Start: 2011-11-15

## 2019-04-18 RX ORDER — LOSARTAN POTASSIUM 100 MG/1
TABLET ORAL
COMMUNITY
Start: 2011-07-12

## 2019-04-18 RX ORDER — OMEPRAZOLE 20 MG/1
CAPSULE, DELAYED RELEASE ORAL
COMMUNITY
Start: 2018-06-20

## 2019-04-18 RX ORDER — PRAVASTATIN SODIUM 20 MG
TABLET ORAL
COMMUNITY
Start: 2016-03-02

## 2019-04-18 RX ORDER — LORAZEPAM 0.5 MG/1
TABLET ORAL
COMMUNITY
Start: 2016-03-02

## 2019-04-18 RX ORDER — METOPROLOL SUCCINATE 25 MG/1
1 TABLET, EXTENDED RELEASE ORAL DAILY
COMMUNITY
Start: 2018-06-20 | End: 2019-06-08 | Stop reason: SDUPTHER

## 2019-05-07 ENCOUNTER — APPOINTMENT (OUTPATIENT)
Dept: LAB | Age: 79
End: 2019-05-07
Attending: INTERNAL MEDICINE
Payer: MEDICARE

## 2019-05-07 DIAGNOSIS — E11.9 TYPE 2 DIABETES MELLITUS WITHOUT COMPLICATION, WITHOUT LONG-TERM CURRENT USE OF INSULIN (HCC): ICD-10-CM

## 2019-05-07 DIAGNOSIS — E78.00 HYPERCHOLESTEREMIA: ICD-10-CM

## 2019-05-07 DIAGNOSIS — R53.83 FATIGUE, UNSPECIFIED TYPE: ICD-10-CM

## 2019-05-07 DIAGNOSIS — N18.30 STAGE 3 CHRONIC KIDNEY DISEASE (HCC): ICD-10-CM

## 2019-05-07 DIAGNOSIS — E78.1 HYPERTRIGLYCERIDEMIA: ICD-10-CM

## 2019-05-07 PROCEDURE — 36415 COLL VENOUS BLD VENIPUNCTURE: CPT

## 2019-05-07 PROCEDURE — 80061 LIPID PANEL: CPT

## 2019-05-07 PROCEDURE — 84460 ALANINE AMINO (ALT) (SGPT): CPT

## 2019-05-07 PROCEDURE — 80048 BASIC METABOLIC PNL TOTAL CA: CPT

## 2019-05-07 PROCEDURE — 84450 TRANSFERASE (AST) (SGOT): CPT

## 2019-05-07 PROCEDURE — 83036 HEMOGLOBIN GLYCOSYLATED A1C: CPT

## 2019-05-10 ENCOUNTER — OFFICE VISIT (OUTPATIENT)
Dept: INTERNAL MEDICINE CLINIC | Facility: CLINIC | Age: 79
End: 2019-05-10
Payer: MEDICARE

## 2019-05-10 VITALS
TEMPERATURE: 98 F | OXYGEN SATURATION: 99 % | WEIGHT: 199.38 LBS | BODY MASS INDEX: 30.22 KG/M2 | DIASTOLIC BLOOD PRESSURE: 60 MMHG | SYSTOLIC BLOOD PRESSURE: 110 MMHG | HEIGHT: 68 IN | HEART RATE: 60 BPM

## 2019-05-10 DIAGNOSIS — E11.9 TYPE 2 DIABETES MELLITUS WITHOUT COMPLICATION, WITHOUT LONG-TERM CURRENT USE OF INSULIN (HCC): ICD-10-CM

## 2019-05-10 DIAGNOSIS — I10 ESSENTIAL HYPERTENSION: ICD-10-CM

## 2019-05-10 DIAGNOSIS — K21.9 GASTROESOPHAGEAL REFLUX DISEASE, ESOPHAGITIS PRESENCE NOT SPECIFIED: ICD-10-CM

## 2019-05-10 DIAGNOSIS — I34.0 MITRAL VALVE INSUFFICIENCY, UNSPECIFIED ETIOLOGY: ICD-10-CM

## 2019-05-10 DIAGNOSIS — E78.1 HYPERTRIGLYCERIDEMIA: ICD-10-CM

## 2019-05-10 DIAGNOSIS — E78.00 HYPERCHOLESTEREMIA: ICD-10-CM

## 2019-05-10 DIAGNOSIS — R53.83 FATIGUE, UNSPECIFIED TYPE: ICD-10-CM

## 2019-05-10 DIAGNOSIS — G62.9 PERIPHERAL POLYNEUROPATHY: ICD-10-CM

## 2019-05-10 DIAGNOSIS — M15.9 PRIMARY OSTEOARTHRITIS INVOLVING MULTIPLE JOINTS: ICD-10-CM

## 2019-05-10 DIAGNOSIS — K58.9 IRRITABLE BOWEL SYNDROME WITHOUT DIARRHEA: ICD-10-CM

## 2019-05-10 DIAGNOSIS — N40.1 BENIGN NON-NODULAR PROSTATIC HYPERPLASIA WITH LOWER URINARY TRACT SYMPTOMS: ICD-10-CM

## 2019-05-10 DIAGNOSIS — I25.10 ASHD (ARTERIOSCLEROTIC HEART DISEASE): Primary | ICD-10-CM

## 2019-05-10 DIAGNOSIS — N18.30 STAGE 3 CHRONIC KIDNEY DISEASE (HCC): ICD-10-CM

## 2019-05-10 PROCEDURE — G0463 HOSPITAL OUTPT CLINIC VISIT: HCPCS | Performed by: INTERNAL MEDICINE

## 2019-05-10 PROCEDURE — 99214 OFFICE O/P EST MOD 30 MIN: CPT | Performed by: INTERNAL MEDICINE

## 2019-05-10 NOTE — PROGRESS NOTES
Bridgette Moscoso is a 77year old male. Patient presents with:  Checkup: 3 month  Hypertension  Hyperlipidemia  Diabetes    HPI:     Pt feels OK. Pt still has pain in his right shoulder. Pt is now seeing Dr Radha Nava.   Pt was given a \"shot\" by Dr Fátima Cardona in t Disp: 90 tablet Rfl: 3   LOSARTAN 100 MG Oral Tab TAKE 1 TABLET ONCE DAILY Disp: 90 tablet Rfl: 3   Pantoprazole Sodium 40 MG Oral Tab EC Take 40 mg by mouth every morning before breakfast. Disp:  Rfl:    aspirin (ASPIRIN ADULT LOW STRENGTH) 81 MG Oral Tab normal C8P8. 2/6 holosystolic murmur noted patient's apex with radiation to his axilla.   GI:Protuberant, BS are present, no organomegaly or palpable masses  EXTREMITIES: no edema  NEURO: alert and oriented  ASSESSMENT AND PLAN:   1. ASHD (arteriosclerotic patient is asked to return in 3 months with blood tests as noted above.     Lenny Santoyo MD  5/10/2019  10:00 AM

## 2019-05-10 NOTE — PATIENT INSTRUCTIONS
1.  Patient is to continue his current diet, medication and activity. 2.  I will plan see the patient back in 3 months with blood tests as ordered. 3.  I will see the patient back sooner as necessary.   4.  Patient may continue to follow-up with Dr. Cameron King

## 2019-06-10 RX ORDER — METOPROLOL SUCCINATE 25 MG/1
TABLET, EXTENDED RELEASE ORAL
Qty: 90 TABLET | Refills: 3 | Status: SHIPPED | OUTPATIENT
Start: 2019-06-10 | End: 2020-06-02

## 2019-07-02 RX ORDER — LOSARTAN POTASSIUM 100 MG/1
100 TABLET ORAL DAILY
Qty: 90 TABLET | Refills: 3 | Status: SHIPPED | OUTPATIENT
Start: 2019-07-02 | End: 2020-06-25

## 2019-08-07 ENCOUNTER — OFFICE VISIT (OUTPATIENT)
Dept: NEUROLOGY | Facility: CLINIC | Age: 79
End: 2019-08-07
Payer: MEDICARE

## 2019-08-07 VITALS
BODY MASS INDEX: 29.4 KG/M2 | HEART RATE: 88 BPM | SYSTOLIC BLOOD PRESSURE: 124 MMHG | DIASTOLIC BLOOD PRESSURE: 70 MMHG | RESPIRATION RATE: 16 BRPM | WEIGHT: 194 LBS | HEIGHT: 68 IN

## 2019-08-07 DIAGNOSIS — I25.10 ATHEROSCLEROSIS OF NATIVE CORONARY ARTERY OF NATIVE HEART WITHOUT ANGINA PECTORIS: ICD-10-CM

## 2019-08-07 DIAGNOSIS — M15.9 GENERALIZED OSTEOARTHROSIS: ICD-10-CM

## 2019-08-07 DIAGNOSIS — K21.9 GASTROESOPHAGEAL REFLUX DISEASE, ESOPHAGITIS PRESENCE NOT SPECIFIED: ICD-10-CM

## 2019-08-07 DIAGNOSIS — G60.9 HEREDITARY AND IDIOPATHIC PERIPHERAL NEUROPATHY: ICD-10-CM

## 2019-08-07 DIAGNOSIS — M96.1 POSTLAMINECTOMY SYNDROME OF LUMBAR REGION: Primary | ICD-10-CM

## 2019-08-07 DIAGNOSIS — E11.9 DIABETES MELLITUS TYPE 2 WITHOUT RETINOPATHY (HCC): ICD-10-CM

## 2019-08-07 PROCEDURE — 99214 OFFICE O/P EST MOD 30 MIN: CPT | Performed by: PHYSICAL MEDICINE & REHABILITATION

## 2019-08-07 NOTE — PROGRESS NOTES
[address]  Progress Note    CHIEF COMPLAINT:  Patient presents with:  Low Back Pain: Patient presents for follow up on low back pain, LOV:2/11/19.  Patient states he still has low back pain, is currently antonio floaters of both eyes 2014       SURGICAL HISTORY:  Past Surgical History:   Procedure Laterality Date   • ANGIOPLASTY (CORONARY)  2011    + stent(s)       SOCIAL HISTORY:   Social History    Occupational History      Not on file    Tobacco Use      Smokin DAILY AS NEEDED FOR ANXIETY 30 tablet 5   • DULoxetine HCl 30 MG Oral Cap DR Particles Take 1 capsule (30 mg total) by mouth daily.  90 capsule 1       ALLERGIES:     Ace Inhibitors          Coughing    Comment:Other reaction(s): Cough    REVIEW OF SYSTEMS: continue on Cymbalta 30 mg indefinitely    2. Diabetes mellitus type 2 without retinopathy ([address]  Will cautiously avoid giving steroids in this diabetic patient due to the risk of hyperglycemia.     3. Atherosclerosis of native coronary artery of native hea

## 2019-08-09 ENCOUNTER — APPOINTMENT (OUTPATIENT)
Dept: LAB | Age: 79
End: 2019-08-09
Attending: INTERNAL MEDICINE
Payer: MEDICARE

## 2019-08-09 DIAGNOSIS — E11.9 TYPE 2 DIABETES MELLITUS WITHOUT COMPLICATION, WITHOUT LONG-TERM CURRENT USE OF INSULIN (HCC): ICD-10-CM

## 2019-08-09 DIAGNOSIS — N18.30 STAGE 3 CHRONIC KIDNEY DISEASE (HCC): ICD-10-CM

## 2019-08-09 DIAGNOSIS — E78.00 HYPERCHOLESTEREMIA: ICD-10-CM

## 2019-08-09 LAB
ALT SERPL-CCNC: 33 U/L (ref 16–61)
ANION GAP SERPL CALC-SCNC: 9 MMOL/L (ref 0–18)
AST SERPL-CCNC: 27 U/L (ref 15–37)
BUN BLD-MCNC: 18 MG/DL (ref 7–18)
BUN/CREAT SERPL: 16.8 (ref 10–20)
CALCIUM BLD-MCNC: 8.9 MG/DL (ref 8.5–10.1)
CHLORIDE SERPL-SCNC: 105 MMOL/L (ref 98–112)
CHOLEST SMN-MCNC: 134 MG/DL (ref ?–200)
CO2 SERPL-SCNC: 26 MMOL/L (ref 21–32)
CREAT BLD-MCNC: 1.07 MG/DL (ref 0.7–1.3)
EST. AVERAGE GLUCOSE BLD GHB EST-MCNC: 131 MG/DL (ref 68–126)
GLUCOSE BLD-MCNC: 116 MG/DL (ref 70–99)
HBA1C MFR BLD HPLC: 6.2 % (ref ?–5.7)
HDLC SERPL-MCNC: 32 MG/DL (ref 40–59)
LDLC SERPL CALC-MCNC: 83 MG/DL (ref ?–100)
NONHDLC SERPL-MCNC: 102 MG/DL (ref ?–130)
OSMOLALITY SERPL CALC.SUM OF ELEC: 293 MOSM/KG (ref 275–295)
PATIENT FASTING: YES
PATIENT FASTING: YES
POTASSIUM SERPL-SCNC: 4 MMOL/L (ref 3.5–5.1)
SODIUM SERPL-SCNC: 140 MMOL/L (ref 136–145)
TRIGL SERPL-MCNC: 97 MG/DL (ref 30–149)
VLDLC SERPL CALC-MCNC: 19 MG/DL (ref 0–30)

## 2019-08-09 PROCEDURE — 84450 TRANSFERASE (AST) (SGOT): CPT

## 2019-08-09 PROCEDURE — 83036 HEMOGLOBIN GLYCOSYLATED A1C: CPT

## 2019-08-09 PROCEDURE — 36415 COLL VENOUS BLD VENIPUNCTURE: CPT

## 2019-08-09 PROCEDURE — 84460 ALANINE AMINO (ALT) (SGPT): CPT

## 2019-08-09 PROCEDURE — 80048 BASIC METABOLIC PNL TOTAL CA: CPT

## 2019-08-09 PROCEDURE — 80061 LIPID PANEL: CPT

## 2019-08-12 RX ORDER — DULOXETIN HYDROCHLORIDE 30 MG/1
30 CAPSULE, DELAYED RELEASE ORAL DAILY
Qty: 90 CAPSULE | Refills: 1 | Status: SHIPPED | OUTPATIENT
Start: 2019-08-12 | End: 2020-02-17

## 2019-08-12 NOTE — TELEPHONE ENCOUNTER
Medication request: Duloxetine 30 mg, Take 1 capsule by mouth once daily.  Qt 90 Refills 0    LOV: 8/7/19  NOV: None    Last refill: 2/11/19

## 2019-08-15 ENCOUNTER — OFFICE VISIT (OUTPATIENT)
Dept: INTERNAL MEDICINE CLINIC | Facility: CLINIC | Age: 79
End: 2019-08-15
Payer: MEDICARE

## 2019-08-15 VITALS
HEIGHT: 68 IN | HEART RATE: 60 BPM | OXYGEN SATURATION: 99 % | BODY MASS INDEX: 29.7 KG/M2 | DIASTOLIC BLOOD PRESSURE: 80 MMHG | TEMPERATURE: 98 F | RESPIRATION RATE: 16 BRPM | WEIGHT: 196 LBS | SYSTOLIC BLOOD PRESSURE: 136 MMHG

## 2019-08-15 DIAGNOSIS — M15.9 PRIMARY OSTEOARTHRITIS INVOLVING MULTIPLE JOINTS: ICD-10-CM

## 2019-08-15 DIAGNOSIS — E78.1 HYPERTRIGLYCERIDEMIA: ICD-10-CM

## 2019-08-15 DIAGNOSIS — M54.42 CHRONIC BILATERAL LOW BACK PAIN WITH BILATERAL SCIATICA: ICD-10-CM

## 2019-08-15 DIAGNOSIS — E78.00 HYPERCHOLESTEREMIA: ICD-10-CM

## 2019-08-15 DIAGNOSIS — M54.50 CHRONIC BILATERAL LOW BACK PAIN, UNSPECIFIED WHETHER SCIATICA PRESENT: ICD-10-CM

## 2019-08-15 DIAGNOSIS — K58.9 IRRITABLE BOWEL SYNDROME WITHOUT DIARRHEA: ICD-10-CM

## 2019-08-15 DIAGNOSIS — R29.898 WEAKNESS OF BOTH LOWER EXTREMITIES: ICD-10-CM

## 2019-08-15 DIAGNOSIS — I34.0 MITRAL VALVE INSUFFICIENCY, UNSPECIFIED ETIOLOGY: ICD-10-CM

## 2019-08-15 DIAGNOSIS — N18.30 STAGE 3 CHRONIC KIDNEY DISEASE (HCC): ICD-10-CM

## 2019-08-15 DIAGNOSIS — M54.41 CHRONIC BILATERAL LOW BACK PAIN WITH BILATERAL SCIATICA: ICD-10-CM

## 2019-08-15 DIAGNOSIS — E11.9 TYPE 2 DIABETES MELLITUS WITHOUT COMPLICATION, WITHOUT LONG-TERM CURRENT USE OF INSULIN (HCC): ICD-10-CM

## 2019-08-15 DIAGNOSIS — G89.29 CHRONIC BILATERAL LOW BACK PAIN, UNSPECIFIED WHETHER SCIATICA PRESENT: ICD-10-CM

## 2019-08-15 DIAGNOSIS — R53.83 FATIGUE, UNSPECIFIED TYPE: ICD-10-CM

## 2019-08-15 DIAGNOSIS — G62.9 PERIPHERAL POLYNEUROPATHY: ICD-10-CM

## 2019-08-15 DIAGNOSIS — K21.9 GASTROESOPHAGEAL REFLUX DISEASE, ESOPHAGITIS PRESENCE NOT SPECIFIED: ICD-10-CM

## 2019-08-15 DIAGNOSIS — G89.29 CHRONIC BILATERAL LOW BACK PAIN WITH BILATERAL SCIATICA: ICD-10-CM

## 2019-08-15 DIAGNOSIS — I25.10 ASHD (ARTERIOSCLEROTIC HEART DISEASE): Primary | ICD-10-CM

## 2019-08-15 DIAGNOSIS — I10 ESSENTIAL HYPERTENSION: ICD-10-CM

## 2019-08-15 DIAGNOSIS — N40.1 BENIGN NON-NODULAR PROSTATIC HYPERPLASIA WITH LOWER URINARY TRACT SYMPTOMS: ICD-10-CM

## 2019-08-15 PROCEDURE — 99214 OFFICE O/P EST MOD 30 MIN: CPT | Performed by: INTERNAL MEDICINE

## 2019-08-15 PROCEDURE — G0463 HOSPITAL OUTPT CLINIC VISIT: HCPCS | Performed by: INTERNAL MEDICINE

## 2019-08-15 RX ORDER — LORAZEPAM 0.5 MG/1
TABLET ORAL
Qty: 30 TABLET | Refills: 5 | Status: SHIPPED
Start: 2019-08-15 | End: 2020-02-07

## 2019-08-15 NOTE — PATIENT INSTRUCTIONS
1.  Patient is to continue his current diet, medication and activity. 2.  I will obtain an MRI of the patient's lumbar spine to evaluate his low back pain and leg weakness. 3.  I will plan to see the patient back in 1 month.

## 2019-08-15 NOTE — PROGRESS NOTES
Prisca Santos is a [de-identified]year old male. Patient presents with:  Checkup: 3 month  Leg Pain: Patient c/o bilateral leg pain off/on that has been getting increasingly worse. Pain 3/10 to bilateral legs.    Hypertension  Hyperlipidemia  Diabetes    HPI:   Azul Cap Take 1 capsule (200 mg total) by mouth daily. Disp: 90 capsule Rfl: 3   METFORMIN HCL 1000 MG Oral Tab TAKE 1 TABLET TWICE A DAY  WITH MEALS Disp: 180 tablet Rfl: 3   Coenzyme Q10 (CO Q-10) 200 MG Oral Cap Take 1 tablet by mouth daily.  Disp:  Rfl:    P normal oropharynx, normal TM's. Ears are normal. Eyes are normal  NECK: supple,no lymphadenopathy or masses, no bruits  CHEST: Well-developed male.   LUNGS: clear to auscultation  CARDIO: RRR, normal S1S2, without murmur   GI:Protuberant, BS are present, no polyneuropathy  Stable. CPM.    13. Fatigue, unspecified type  Doing well.  CPM.    14.  Bilateral leg weakness. Patient's symptoms initially sounded as though the patient had claudication in his calves.   However the patient's peripheral pulses appear to

## 2019-08-16 NOTE — TELEPHONE ENCOUNTER
Noted.  I have printed out and signed a new prescription for the patient's medication as requested. I have left the prescription on my nurses desk. Please fax the prescription to the patient's pharmacy as requested.   Also please notify the patient that t

## 2019-08-16 NOTE — TELEPHONE ENCOUNTER
Faxed prescription to [facility] at [phone]. Fax confirmation received and sent to [facility]. I spoke with patient's wife, OK per HIPPA, and relayed Dr. Jackie Pastrana message. She verbalized understanding.

## 2019-08-27 ENCOUNTER — HOSPITAL ENCOUNTER (OUTPATIENT)
Dept: MRI IMAGING | Facility: HOSPITAL | Age: 79
Discharge: HOME | End: 2019-08-27
Attending: INTERNAL MEDICINE
Payer: MEDICARE

## 2019-08-27 DIAGNOSIS — R29.898 WEAKNESS OF BOTH LOWER EXTREMITIES: ICD-10-CM

## 2019-08-27 DIAGNOSIS — M54.41 CHRONIC BILATERAL LOW BACK PAIN WITH BILATERAL SCIATICA: ICD-10-CM

## 2019-08-27 DIAGNOSIS — M54.42 CHRONIC BILATERAL LOW BACK PAIN WITH BILATERAL SCIATICA: ICD-10-CM

## 2019-08-27 DIAGNOSIS — G89.29 CHRONIC BILATERAL LOW BACK PAIN, UNSPECIFIED WHETHER SCIATICA PRESENT: ICD-10-CM

## 2019-08-27 DIAGNOSIS — M15.9 PRIMARY OSTEOARTHRITIS INVOLVING MULTIPLE JOINTS: ICD-10-CM

## 2019-08-27 DIAGNOSIS — M54.50 CHRONIC BILATERAL LOW BACK PAIN, UNSPECIFIED WHETHER SCIATICA PRESENT: ICD-10-CM

## 2019-08-27 DIAGNOSIS — G89.29 CHRONIC BILATERAL LOW BACK PAIN WITH BILATERAL SCIATICA: ICD-10-CM

## 2019-08-27 PROCEDURE — 72148 MRI LUMBAR SPINE W/O DYE: CPT | Performed by: INTERNAL MEDICINE

## 2019-09-05 RX ORDER — ZOLPIDEM TARTRATE 10 MG/1
TABLET ORAL
Qty: 90 TABLET | Refills: 1 | Status: SHIPPED
Start: 2019-09-05 | End: 2020-03-03

## 2019-09-05 NOTE — TELEPHONE ENCOUNTER
I have printed out and signed a new prescription for the patient's medication as requested. This prescription can be faxed to patient's pharmacy. I have left the prescription on my nurse's desk. Please fax this prescription to the patient's pharmacy as requested. Also please notify the patient this is been done. I will route this to nursing.   Thank you!!

## 2019-09-05 NOTE — TELEPHONE ENCOUNTER
To MD:  The above refill request is for a controlled substance. Please review pended medication order. Print and sign for staff to fax to pharmacy. Last written 3/8/19 #90 with 1 refill. [place]  last dispensed 6/6/19. Refill pending with #90 with 1 refill as sent in last time. Spoke to patient who reports he would like the medication sent to [facility] in [place], pharmacy included in pended med.

## 2019-09-06 RX ORDER — CELECOXIB 200 MG/1
CAPSULE ORAL
Qty: 90 CAPSULE | Refills: 3 | Status: SHIPPED | OUTPATIENT
Start: 2019-09-06 | End: 2020-07-20

## 2019-09-10 ENCOUNTER — TELEPHONE (OUTPATIENT)
Dept: INTERNAL MEDICINE CLINIC | Facility: CLINIC | Age: 79
End: 2019-09-10

## 2019-09-10 NOTE — TELEPHONE ENCOUNTER
Telephone call to pt and message left concerning his recent MRI of his lumbar Spine which shows much arthritis and and degenerative changes. I will discuss further at pt's next visit next week.

## 2019-09-20 ENCOUNTER — OFFICE VISIT (OUTPATIENT)
Dept: INTERNAL MEDICINE CLINIC | Facility: CLINIC | Age: 79
End: 2019-09-20
Payer: MEDICARE

## 2019-09-20 VITALS
HEART RATE: 60 BPM | TEMPERATURE: 98 F | OXYGEN SATURATION: 98 % | BODY MASS INDEX: 30 KG/M2 | SYSTOLIC BLOOD PRESSURE: 130 MMHG | DIASTOLIC BLOOD PRESSURE: 66 MMHG | WEIGHT: 197 LBS

## 2019-09-20 DIAGNOSIS — K58.9 IRRITABLE BOWEL SYNDROME WITHOUT DIARRHEA: ICD-10-CM

## 2019-09-20 DIAGNOSIS — E78.1 HYPERTRIGLYCERIDEMIA: ICD-10-CM

## 2019-09-20 DIAGNOSIS — K21.9 GASTROESOPHAGEAL REFLUX DISEASE, ESOPHAGITIS PRESENCE NOT SPECIFIED: ICD-10-CM

## 2019-09-20 DIAGNOSIS — R29.898 WEAKNESS OF BOTH LOWER EXTREMITIES: Primary | ICD-10-CM

## 2019-09-20 DIAGNOSIS — E11.9 DIABETES MELLITUS TYPE 2 WITHOUT RETINOPATHY (HCC): ICD-10-CM

## 2019-09-20 DIAGNOSIS — I25.10 ASHD (ARTERIOSCLEROTIC HEART DISEASE): ICD-10-CM

## 2019-09-20 DIAGNOSIS — E11.9 TYPE 2 DIABETES MELLITUS WITHOUT COMPLICATION, WITHOUT LONG-TERM CURRENT USE OF INSULIN (HCC): ICD-10-CM

## 2019-09-20 DIAGNOSIS — Z00.00 ANNUAL PHYSICAL EXAM: ICD-10-CM

## 2019-09-20 DIAGNOSIS — E78.00 HYPERCHOLESTEREMIA: ICD-10-CM

## 2019-09-20 DIAGNOSIS — R53.83 FATIGUE, UNSPECIFIED TYPE: ICD-10-CM

## 2019-09-20 DIAGNOSIS — N18.30 STAGE 3 CHRONIC KIDNEY DISEASE (HCC): ICD-10-CM

## 2019-09-20 DIAGNOSIS — G62.9 PERIPHERAL POLYNEUROPATHY: ICD-10-CM

## 2019-09-20 DIAGNOSIS — I10 ESSENTIAL HYPERTENSION: ICD-10-CM

## 2019-09-20 DIAGNOSIS — M15.9 PRIMARY OSTEOARTHRITIS INVOLVING MULTIPLE JOINTS: ICD-10-CM

## 2019-09-20 DIAGNOSIS — M48.061 SPINAL STENOSIS OF LUMBAR REGION, UNSPECIFIED WHETHER NEUROGENIC CLAUDICATION PRESENT: ICD-10-CM

## 2019-09-20 PROCEDURE — G0463 HOSPITAL OUTPT CLINIC VISIT: HCPCS | Performed by: INTERNAL MEDICINE

## 2019-09-20 PROCEDURE — 99214 OFFICE O/P EST MOD 30 MIN: CPT | Performed by: INTERNAL MEDICINE

## 2019-09-20 NOTE — PATIENT INSTRUCTIONS
1.  Patient is to continue his current diet, medication and activity. 2.  I will forward a message and to, hopefully, a copy of my progress note to Dr. Tammy Rodriguez for his review concerning the patient's bilateral leg pain.   3.  Patient will also attempt

## 2019-09-20 NOTE — PROGRESS NOTES
Eyad Huntley is a [de-identified]year old male. Patient presents with:  Checkup: 1 mo f/u  Arthritis  Hypertension  Hyperlipidemia  Diabetes    HPI:   Patient presents with:  Checkup: 1 mo f/u  Arthritis  Hypertension  Hyperlipidemia  Diabetes    Pt feels the same. AMLODIPINE BESYLATE 10 MG Oral Tab TAKE 1 TABLET DAILY Disp: 90 tablet Rfl: 3   Coenzyme Q10 (CO Q-10) 200 MG Oral Cap Take 1 tablet by mouth daily.  Disp:  Rfl:    Pravastatin Sodium (PRAVACHOL) 40 MG Oral Tab Take 1 tablet (40 mg total) by mouth nightly male.  LUNGS: clear to auscultation  CARDIO: RRR, normal S1S2, without murmur   GI:Protuberant, BS are present, no organomegaly or palpable masses  EXTREMITIES: no edema. All peripheral pulses are intact.   NEURO: alert and oriented  ASSESSMENT AND PLAN: Hypercholesteremia  Stable. CPM.  As above. 9. Hypertriglyceridemia  Stable. CPM.  As above. 10. Peripheral polyneuropathy  Stable. CPM.    11. Irritable bowel syndrome without diarrhea  Stable.   CPM.    12. Gastroesophageal reflux disease, esopha

## 2019-09-29 RX ORDER — AMLODIPINE BESYLATE 10 MG/1
TABLET ORAL
Qty: 90 TABLET | Refills: 3 | Status: SHIPPED | OUTPATIENT
Start: 2019-09-29 | End: 2020-09-30

## 2019-10-03 NOTE — TELEPHONE ENCOUNTER
To Nneka Wiley- just wanted to check with you, mention of pain/weakness in legs in last few OVs. OK to fill?

## 2019-10-04 NOTE — TELEPHONE ENCOUNTER
Good question - pt was originally on Pravastatin 20 mg and increased to 40 mg in 2017; several months after pt had complaints with pain; With statins, ADR can happen anytime during therapy but also after a dose increase.   LMTCB to discuss with pt

## 2019-10-07 NOTE — TELEPHONE ENCOUNTER
Pt stopped by the office to discuss - inquired if pt had ever held the statin in the past for the pain he has developed. He reports he has not; We discussed a seven day hold to see if pain improves.   Pt would like to do this   I told pt to call office n

## 2019-10-08 RX ORDER — PRAVASTATIN SODIUM 40 MG
TABLET ORAL
Qty: 90 TABLET | Refills: 3 | Status: SHIPPED | OUTPATIENT
Start: 2019-10-08 | End: 2020-09-30

## 2019-10-11 RX ORDER — SPIRONOLACTONE 25 MG/1
TABLET ORAL
Qty: 90 TABLET | Refills: 3 | OUTPATIENT
Start: 2019-10-11

## 2019-10-11 NOTE — TELEPHONE ENCOUNTER
Refill request has failed the Ambulatory Medication Refill Standing Order and is routed to the primary physician to review the following:    Requested Prescriptions     Refused Prescriptions Disp Refills   • SPIRONOLACTONE 25 MG Oral Tab [Pharmacy Med Name

## 2019-10-14 ENCOUNTER — TELEPHONE (OUTPATIENT)
Dept: INTERNAL MEDICINE CLINIC | Facility: CLINIC | Age: 79
End: 2019-10-14

## 2019-10-14 NOTE — TELEPHONE ENCOUNTER
Please call pt   1) Metoprolol did not make a difference when he took in the evening, pt will switch back to morning unless Geraldine Bah advises otherwise  2) Pt was to stop Pravastatin for 7 days  After stopping didn't make a difference  Pt will restart unless

## 2019-10-15 NOTE — TELEPHONE ENCOUNTER
Annmarie Purcell           10/14/19 11:56 AM   Note      Please call pt   1) Metoprolol did not make a difference when he took in the evening, pt will switch back to morning unless Rosalba Sarmiento advises otherwise  2) Pt was to stop Pravastatin for 7 days  After stoppin

## 2019-10-15 NOTE — TELEPHONE ENCOUNTER
Spoke to pt - prior meds resumed as this did not change any of his current issues;  Pt did state when he moved his Metoprolol back to AM, his BP has been fine since  Pt has OV scheduled in Nov

## 2019-11-19 ENCOUNTER — LAB ENCOUNTER (OUTPATIENT)
Dept: LAB | Age: 79
End: 2019-11-19
Attending: INTERNAL MEDICINE
Payer: MEDICARE

## 2019-11-19 DIAGNOSIS — Z00.00 ANNUAL PHYSICAL EXAM: ICD-10-CM

## 2019-11-19 DIAGNOSIS — N18.30 STAGE 3 CHRONIC KIDNEY DISEASE (HCC): ICD-10-CM

## 2019-11-19 DIAGNOSIS — E78.00 HYPERCHOLESTEREMIA: ICD-10-CM

## 2019-11-19 DIAGNOSIS — E11.9 TYPE 2 DIABETES MELLITUS WITHOUT COMPLICATION, WITHOUT LONG-TERM CURRENT USE OF INSULIN (HCC): ICD-10-CM

## 2019-11-19 DIAGNOSIS — E11.9 DIABETES MELLITUS TYPE 2 WITHOUT RETINOPATHY (HCC): ICD-10-CM

## 2019-11-19 DIAGNOSIS — E78.1 HYPERTRIGLYCERIDEMIA: ICD-10-CM

## 2019-11-19 DIAGNOSIS — R53.83 FATIGUE, UNSPECIFIED TYPE: ICD-10-CM

## 2019-11-19 PROCEDURE — 81001 URINALYSIS AUTO W/SCOPE: CPT

## 2019-11-19 PROCEDURE — 85025 COMPLETE CBC W/AUTO DIFF WBC: CPT

## 2019-11-19 PROCEDURE — 83036 HEMOGLOBIN GLYCOSYLATED A1C: CPT

## 2019-11-19 PROCEDURE — 80053 COMPREHEN METABOLIC PANEL: CPT

## 2019-11-19 PROCEDURE — 36415 COLL VENOUS BLD VENIPUNCTURE: CPT

## 2019-11-19 PROCEDURE — 80061 LIPID PANEL: CPT

## 2019-11-19 PROCEDURE — 84443 ASSAY THYROID STIM HORMONE: CPT

## 2019-11-22 ENCOUNTER — OFFICE VISIT (OUTPATIENT)
Dept: INTERNAL MEDICINE CLINIC | Facility: CLINIC | Age: 79
End: 2019-11-22
Payer: MEDICARE

## 2019-11-22 VITALS
WEIGHT: 200 LBS | RESPIRATION RATE: 14 BRPM | OXYGEN SATURATION: 96 % | HEIGHT: 68 IN | SYSTOLIC BLOOD PRESSURE: 130 MMHG | TEMPERATURE: 98 F | BODY MASS INDEX: 30.31 KG/M2 | DIASTOLIC BLOOD PRESSURE: 66 MMHG | HEART RATE: 56 BPM

## 2019-11-22 DIAGNOSIS — M15.9 PRIMARY OSTEOARTHRITIS INVOLVING MULTIPLE JOINTS: ICD-10-CM

## 2019-11-22 DIAGNOSIS — I25.10 ASHD (ARTERIOSCLEROTIC HEART DISEASE): ICD-10-CM

## 2019-11-22 DIAGNOSIS — I34.0 MITRAL VALVE INSUFFICIENCY, UNSPECIFIED ETIOLOGY: ICD-10-CM

## 2019-11-22 DIAGNOSIS — N40.1 BENIGN NON-NODULAR PROSTATIC HYPERPLASIA WITH LOWER URINARY TRACT SYMPTOMS: ICD-10-CM

## 2019-11-22 DIAGNOSIS — R94.31 ABNORMAL EKG: ICD-10-CM

## 2019-11-22 DIAGNOSIS — R29.898 WEAKNESS OF BOTH LOWER EXTREMITIES: ICD-10-CM

## 2019-11-22 DIAGNOSIS — K58.9 IRRITABLE BOWEL SYNDROME WITHOUT DIARRHEA: ICD-10-CM

## 2019-11-22 DIAGNOSIS — M48.061 SPINAL STENOSIS OF LUMBAR REGION, UNSPECIFIED WHETHER NEUROGENIC CLAUDICATION PRESENT: ICD-10-CM

## 2019-11-22 DIAGNOSIS — G62.9 PERIPHERAL POLYNEUROPATHY: ICD-10-CM

## 2019-11-22 DIAGNOSIS — R53.83 FATIGUE, UNSPECIFIED TYPE: ICD-10-CM

## 2019-11-22 DIAGNOSIS — Z00.00 ANNUAL PHYSICAL EXAM: Primary | ICD-10-CM

## 2019-11-22 DIAGNOSIS — E11.9 TYPE 2 DIABETES MELLITUS WITHOUT COMPLICATION, WITHOUT LONG-TERM CURRENT USE OF INSULIN (HCC): ICD-10-CM

## 2019-11-22 DIAGNOSIS — N18.30 STAGE 3 CHRONIC KIDNEY DISEASE (HCC): ICD-10-CM

## 2019-11-22 DIAGNOSIS — E78.1 HYPERTRIGLYCERIDEMIA: ICD-10-CM

## 2019-11-22 DIAGNOSIS — K21.9 GASTROESOPHAGEAL REFLUX DISEASE, ESOPHAGITIS PRESENCE NOT SPECIFIED: ICD-10-CM

## 2019-11-22 DIAGNOSIS — I10 ESSENTIAL HYPERTENSION: ICD-10-CM

## 2019-11-22 DIAGNOSIS — E78.00 HYPERCHOLESTEREMIA: ICD-10-CM

## 2019-11-22 PROCEDURE — 99214 OFFICE O/P EST MOD 30 MIN: CPT | Performed by: INTERNAL MEDICINE

## 2019-11-22 PROCEDURE — 93005 ELECTROCARDIOGRAM TRACING: CPT | Performed by: INTERNAL MEDICINE

## 2019-11-22 PROCEDURE — G0439 PPPS, SUBSEQ VISIT: HCPCS | Performed by: INTERNAL MEDICINE

## 2019-11-22 PROCEDURE — G0463 HOSPITAL OUTPT CLINIC VISIT: HCPCS | Performed by: INTERNAL MEDICINE

## 2019-11-22 PROCEDURE — 93010 ELECTROCARDIOGRAM REPORT: CPT | Performed by: INTERNAL MEDICINE

## 2019-11-22 RX ORDER — FAMOTIDINE 20 MG/1
20 TABLET ORAL 2 TIMES DAILY
Qty: 180 TABLET | Refills: 3 | Status: SHIPPED | OUTPATIENT
Start: 2019-11-22 | End: 2020-01-20 | Stop reason: ALTCHOICE

## 2019-11-22 NOTE — PROGRESS NOTES
Héctor Shields is a [de-identified]year old male who presents for a complete physical exam.   HPI:   Mr. Nalini Vo is an 89-MAHD-SNZ white male who was seen by me on November 22, 2019 for his Medicare annual physical examination.   At the time of the examina 25 MG Oral Tablet 24 Hr Take 25 mg by mouth once daily. 3   • Coenzyme Q10 (CO Q-10) 200 MG Oral Cap Take 1 tablet by mouth daily. • aspirin (ASPIRIN ADULT LOW STRENGTH) 81 MG Oral Tab EC Take 1 tablet by mouth daily.         Past Medical History:   Michael Eubanks kg)   SpO2 96%   BMI 30.41 kg/m²   GENERAL: well developed, well nourished,in no acute distress  SKIN: no rashes,no suspicious lesions  HEENT: atraumatic, normocephalic, normal oropharynx, ears appear normal, normal TM's  EYES:PERRLA, EOMI, conjunctivae pi blood pressure is doing well.  CPM.  As above. 3. Hypercholesteremia  Doing well.  CPM.  Patient's recent lipid panel had a cholesterol 138, triglycerides 106, HDL cholesterol was 28 and LDL cholesterol was 89. Patient's AST was 26 and ALT was 33.   CPM doing.      12. Primary osteoarthritis involving multiple joints  Stable. CPM.    13. Spinal stenosis of lumbar region, unspecified whether neurogenic claudication present  Stable.   CPM.    14. Stage 3 chronic kidney disease ([address]  Doing well.  CPM.  Alicia Functional Status     Hearing Problems?: No    Vision Problems? : No    Difficulty walking?: No    Difficulty dressing or bathing?: No    Problems with daily activities? : No    Memory Problems?: No      Fall/Risk Assessment     Do you have 3 or more m room such as at a meeting or place of Hinduism:  No   Many people I talk to seem to mumble (or don't speak clearly):  No People get annoyed because I misunderstand what they say:  No   I misunderstand what others are saying and make inappropriate responses: Screening      Ophthalmology Visit Annually Pt see his Ophthalmologist.    Prostate Cancer Screening      PSA  Annually PSA due on 11/05/2019  Update Health Maintenance if applicable   Immunizations      Influenza No orders found for this or any previous v

## 2019-11-22 NOTE — PATIENT INSTRUCTIONS
1.  Patient is to continue his current diet, medication and activity. 2.  I have stopped the patient's omeprazole. 3.  Patient is to take Pepcid 20 mg orally twice daily as necessary for heartburn.   If patient does well with this he can decrease the Pepc

## 2019-12-01 PROBLEM — I25.10 ATHEROSCLEROSIS OF NATIVE CORONARY ARTERY OF NATIVE HEART WITHOUT ANGINA PECTORIS: Status: ACTIVE | Noted: 2019-02-07

## 2019-12-03 PROBLEM — I10 ESSENTIAL HYPERTENSION: Status: ACTIVE | Noted: 2019-12-03

## 2019-12-03 PROBLEM — E78.5 DYSLIPIDEMIA: Status: ACTIVE | Noted: 2019-12-03

## 2019-12-03 PROBLEM — E11.9 DIABETES (CMD): Status: ACTIVE | Noted: 2019-12-03

## 2019-12-04 ENCOUNTER — OFFICE VISIT (OUTPATIENT)
Dept: CARDIOLOGY | Age: 79
End: 2019-12-04

## 2019-12-04 VITALS
SYSTOLIC BLOOD PRESSURE: 136 MMHG | BODY MASS INDEX: 31.23 KG/M2 | HEART RATE: 57 BPM | OXYGEN SATURATION: 96 % | WEIGHT: 199 LBS | HEIGHT: 67 IN | RESPIRATION RATE: 18 BRPM | DIASTOLIC BLOOD PRESSURE: 70 MMHG

## 2019-12-04 DIAGNOSIS — E78.5 DYSLIPIDEMIA: ICD-10-CM

## 2019-12-04 DIAGNOSIS — I25.10 ATHEROSCLEROSIS OF NATIVE CORONARY ARTERY OF NATIVE HEART WITHOUT ANGINA PECTORIS: Primary | ICD-10-CM

## 2019-12-04 PROCEDURE — 99214 OFFICE O/P EST MOD 30 MIN: CPT | Performed by: INTERNAL MEDICINE

## 2019-12-04 ASSESSMENT — PATIENT HEALTH QUESTIONNAIRE - PHQ9
SUM OF ALL RESPONSES TO PHQ9 QUESTIONS 1 AND 2: 0
SUM OF ALL RESPONSES TO PHQ9 QUESTIONS 1 AND 2: 0
1. LITTLE INTEREST OR PLEASURE IN DOING THINGS: NOT AT ALL
2. FEELING DOWN, DEPRESSED OR HOPELESS: NOT AT ALL

## 2020-01-02 ENCOUNTER — TELEPHONE (OUTPATIENT)
Dept: INTERNAL MEDICINE CLINIC | Facility: CLINIC | Age: 80
End: 2020-01-02

## 2020-01-02 RX ORDER — AZITHROMYCIN 250 MG/1
TABLET, FILM COATED ORAL
Qty: 6 TABLET | Refills: 1 | Status: SHIPPED | OUTPATIENT
Start: 2020-01-02 | End: 2020-02-24 | Stop reason: ALTCHOICE

## 2020-01-02 NOTE — TELEPHONE ENCOUNTER
Patient wife Aidan Pitts calling for appointment today with Dr. Arianne Leach. Patient has not been feeling well since [place]. Started with sore throat, but has gone away. Is congested, dry cough and lethargic. Has clear phlegm. No fever.      [facility]

## 2020-01-02 NOTE — TELEPHONE ENCOUNTER
Discussed with patient. Patient complains of a dry cough with some head congestion. He has no complaints of fever. Patient does feel weak. Patient is to push fluids. Patient may use Zyrtec for head congestion.   He may use Robitussin for cough or chest

## 2020-01-02 NOTE — TELEPHONE ENCOUNTER
To Dr. Sujata Garnica - see below - some head congestion, pt taking zyrtec which resolves sx. Main complaint:  Dry cough, lethargy, not feeling well, low activity, occasional intermittent dizziness. Denies fever/chill/wheezing.   Pt thinks he only needs zpak

## 2020-01-20 ENCOUNTER — OFFICE VISIT (OUTPATIENT)
Dept: INTERNAL MEDICINE CLINIC | Facility: CLINIC | Age: 80
End: 2020-01-20
Payer: MEDICARE

## 2020-01-20 VITALS
SYSTOLIC BLOOD PRESSURE: 130 MMHG | WEIGHT: 201.38 LBS | OXYGEN SATURATION: 99 % | HEIGHT: 68 IN | DIASTOLIC BLOOD PRESSURE: 60 MMHG | TEMPERATURE: 98 F | HEART RATE: 72 BPM | BODY MASS INDEX: 30.52 KG/M2

## 2020-01-20 DIAGNOSIS — E11.9 TYPE 2 DIABETES MELLITUS WITHOUT COMPLICATION, WITHOUT LONG-TERM CURRENT USE OF INSULIN (HCC): ICD-10-CM

## 2020-01-20 DIAGNOSIS — K21.9 GASTROESOPHAGEAL REFLUX DISEASE, ESOPHAGITIS PRESENCE NOT SPECIFIED: ICD-10-CM

## 2020-01-20 DIAGNOSIS — M15.9 PRIMARY OSTEOARTHRITIS INVOLVING MULTIPLE JOINTS: ICD-10-CM

## 2020-01-20 DIAGNOSIS — I25.10 ASHD (ARTERIOSCLEROTIC HEART DISEASE): ICD-10-CM

## 2020-01-20 DIAGNOSIS — I10 ESSENTIAL HYPERTENSION: Primary | ICD-10-CM

## 2020-01-20 DIAGNOSIS — E78.1 HYPERTRIGLYCERIDEMIA: ICD-10-CM

## 2020-01-20 DIAGNOSIS — G62.9 PERIPHERAL POLYNEUROPATHY: ICD-10-CM

## 2020-01-20 DIAGNOSIS — K58.9 IRRITABLE BOWEL SYNDROME WITHOUT DIARRHEA: ICD-10-CM

## 2020-01-20 DIAGNOSIS — M48.061 SPINAL STENOSIS OF LUMBAR REGION, UNSPECIFIED WHETHER NEUROGENIC CLAUDICATION PRESENT: ICD-10-CM

## 2020-01-20 DIAGNOSIS — N40.1 BENIGN NON-NODULAR PROSTATIC HYPERPLASIA WITH LOWER URINARY TRACT SYMPTOMS: ICD-10-CM

## 2020-01-20 DIAGNOSIS — E78.00 HYPERCHOLESTEREMIA: ICD-10-CM

## 2020-01-20 DIAGNOSIS — R53.83 FATIGUE, UNSPECIFIED TYPE: ICD-10-CM

## 2020-01-20 DIAGNOSIS — I34.0 MITRAL VALVE INSUFFICIENCY, UNSPECIFIED ETIOLOGY: ICD-10-CM

## 2020-01-20 PROBLEM — N18.30 STAGE 3 CHRONIC KIDNEY DISEASE (HCC): Status: RESOLVED | Noted: 2018-05-07 | Resolved: 2020-01-20

## 2020-01-20 PROCEDURE — 99214 OFFICE O/P EST MOD 30 MIN: CPT | Performed by: INTERNAL MEDICINE

## 2020-01-20 RX ORDER — OMEPRAZOLE 20 MG/1
20 CAPSULE, DELAYED RELEASE ORAL
COMMUNITY
End: 2020-01-20

## 2020-01-20 RX ORDER — OMEPRAZOLE 20 MG/1
20 CAPSULE, DELAYED RELEASE ORAL
Qty: 90 CAPSULE | Refills: 3 | Status: SHIPPED | OUTPATIENT
Start: 2020-01-20 | End: 2020-11-24

## 2020-01-20 NOTE — PATIENT INSTRUCTIONS
1.  Patient is to continue his current diet, medication and activity. 2.  Patient is to stop his Pepcid and resume taking omeprazole 20 mg orally daily. 3.  Patient is to continue his other medications.   4.  I will see the patient back in 1 month with jesse

## 2020-01-20 NOTE — PROGRESS NOTES
Andrew Lund is a [de-identified]year old male. Patient presents with:  Dizziness: sx onset: 4weeks. reports dizziness and \"black outs\". Reports episodes of falling over when bending forward or urinating. Unsure if he lost consciouness.  Last episode was 7-10 days capsule (20 mg total) by mouth every morning before breakfast. 90 capsule 3   • azithromycin 250 MG Oral Tab Take two tablets by mouth today, then one daily.  6 tablet 1   • PRAVASTATIN SODIUM 40 MG Oral Tab TAKE 1 TABLET NIGHTLY 90 tablet 3   • AMLODIPINE 2014      Social History:  Social History    Tobacco Use      Smoking status: Never Smoker      Smokeless tobacco: Never Used    Alcohol use: Yes      Comment: 2-3 drinks/month, Occasionally    Drug use: No       REVIEW OF SYSTEMS:   GENERAL HEALTH: feels symptoms  Fatigue, unspecified type    Patient has had a difficult time over the past month with feeling weak and having dizziness. He has had some near syncopal episodes also. Patient is been feeling better for the past few days.   At this point the clive

## 2020-02-07 RX ORDER — LORAZEPAM 0.5 MG/1
0.5 TABLET ORAL 2 TIMES DAILY PRN
Qty: 30 TABLET | Refills: 5 | Status: SHIPPED
Start: 2020-02-07 | End: 2020-08-07

## 2020-02-07 NOTE — TELEPHONE ENCOUNTER
Current refill request refused due to refill is either a duplicate request or has active refills at the pharmacy. Check previous templates.     Last filled 11/2017 for 1 year
172

## 2020-02-08 NOTE — TELEPHONE ENCOUNTER
Noted.  I attempted to refill this patient's medication electronically but \"it would not go through\". As a result the prescription printed out in our office. I have signed the prescription and left it in an envelope with the patient's name on my nurse's desk. Please call the patient and notify him this is happened and that he will need to come to the office to  the prescription. I will route this to nursing.   Thank you!!

## 2020-02-11 ENCOUNTER — TELEPHONE (OUTPATIENT)
Dept: INTERNAL MEDICINE CLINIC | Facility: CLINIC | Age: 80
End: 2020-02-11

## 2020-02-11 RX ORDER — GLIMEPIRIDE 2 MG/1
TABLET ORAL
Qty: 90 TABLET | Refills: 3 | Status: SHIPPED | OUTPATIENT
Start: 2020-02-11 | End: 2021-02-09

## 2020-02-11 NOTE — TELEPHONE ENCOUNTER
Fax from [facility] for:    Glimepiride 2mg - 1 tablet with breakfast        Send to [facility] (Fax: [phone])

## 2020-02-17 ENCOUNTER — APPOINTMENT (OUTPATIENT)
Dept: LAB | Age: 80
End: 2020-02-17
Attending: INTERNAL MEDICINE
Payer: MEDICARE

## 2020-02-17 DIAGNOSIS — E11.9 TYPE 2 DIABETES MELLITUS WITHOUT COMPLICATION, WITHOUT LONG-TERM CURRENT USE OF INSULIN (HCC): ICD-10-CM

## 2020-02-17 DIAGNOSIS — R53.83 FATIGUE, UNSPECIFIED TYPE: ICD-10-CM

## 2020-02-17 DIAGNOSIS — N18.30 STAGE 3 CHRONIC KIDNEY DISEASE (HCC): ICD-10-CM

## 2020-02-17 DIAGNOSIS — E78.1 HYPERTRIGLYCERIDEMIA: ICD-10-CM

## 2020-02-17 DIAGNOSIS — E78.00 HYPERCHOLESTEREMIA: ICD-10-CM

## 2020-02-17 LAB
ALT SERPL-CCNC: 35 U/L (ref 16–61)
ANION GAP SERPL CALC-SCNC: 6 MMOL/L (ref 0–18)
AST SERPL-CCNC: 29 U/L (ref 15–37)
BUN BLD-MCNC: 21 MG/DL (ref 7–18)
BUN/CREAT SERPL: 18.6 (ref 10–20)
CALCIUM BLD-MCNC: 9.2 MG/DL (ref 8.5–10.1)
CHLORIDE SERPL-SCNC: 105 MMOL/L (ref 98–112)
CHOLEST SMN-MCNC: 147 MG/DL (ref ?–200)
CO2 SERPL-SCNC: 28 MMOL/L (ref 21–32)
CREAT BLD-MCNC: 1.13 MG/DL (ref 0.7–1.3)
EST. AVERAGE GLUCOSE BLD GHB EST-MCNC: 146 MG/DL (ref 68–126)
GLUCOSE BLD-MCNC: 138 MG/DL (ref 70–99)
HBA1C MFR BLD HPLC: 6.7 % (ref ?–5.7)
HDLC SERPL-MCNC: 31 MG/DL (ref 40–59)
LDLC SERPL CALC-MCNC: 99 MG/DL (ref ?–100)
NONHDLC SERPL-MCNC: 116 MG/DL (ref ?–130)
OSMOLALITY SERPL CALC.SUM OF ELEC: 293 MOSM/KG (ref 275–295)
PATIENT FASTING Y/N/NP: YES
PATIENT FASTING Y/N/NP: YES
POTASSIUM SERPL-SCNC: 3.8 MMOL/L (ref 3.5–5.1)
SODIUM SERPL-SCNC: 139 MMOL/L (ref 136–145)
TRIGL SERPL-MCNC: 86 MG/DL (ref 30–149)
VLDLC SERPL CALC-MCNC: 17 MG/DL (ref 0–30)

## 2020-02-17 PROCEDURE — 84450 TRANSFERASE (AST) (SGOT): CPT

## 2020-02-17 PROCEDURE — 80048 BASIC METABOLIC PNL TOTAL CA: CPT

## 2020-02-17 PROCEDURE — 36415 COLL VENOUS BLD VENIPUNCTURE: CPT

## 2020-02-17 PROCEDURE — 84460 ALANINE AMINO (ALT) (SGPT): CPT

## 2020-02-17 PROCEDURE — 80061 LIPID PANEL: CPT

## 2020-02-17 PROCEDURE — 83036 HEMOGLOBIN GLYCOSYLATED A1C: CPT

## 2020-02-17 RX ORDER — DULOXETIN HYDROCHLORIDE 30 MG/1
30 CAPSULE, DELAYED RELEASE ORAL DAILY
Qty: 90 CAPSULE | Refills: 1 | Status: SHIPPED | OUTPATIENT
Start: 2020-02-17 | End: 2020-08-17

## 2020-02-17 RX ORDER — DULOXETIN HYDROCHLORIDE 30 MG/1
30 CAPSULE, DELAYED RELEASE ORAL DAILY
Qty: 90 CAPSULE | Refills: 1 | OUTPATIENT
Start: 2020-02-17

## 2020-02-17 NOTE — TELEPHONE ENCOUNTER
Medication request: Duloxetine 30 mg, Take 1 capsule by mouth daily.  Qt 30 Refills 0    LOV:8/7/2019  NOV:None    Last refill:8/7/2019

## 2020-02-24 ENCOUNTER — OFFICE VISIT (OUTPATIENT)
Dept: INTERNAL MEDICINE CLINIC | Facility: CLINIC | Age: 80
End: 2020-02-24
Payer: MEDICARE

## 2020-02-24 VITALS
OXYGEN SATURATION: 96 % | BODY MASS INDEX: 30.71 KG/M2 | HEIGHT: 68 IN | TEMPERATURE: 98 F | DIASTOLIC BLOOD PRESSURE: 60 MMHG | SYSTOLIC BLOOD PRESSURE: 130 MMHG | WEIGHT: 202.63 LBS | HEART RATE: 76 BPM

## 2020-02-24 DIAGNOSIS — K21.9 GASTROESOPHAGEAL REFLUX DISEASE, ESOPHAGITIS PRESENCE NOT SPECIFIED: ICD-10-CM

## 2020-02-24 DIAGNOSIS — K58.9 IRRITABLE BOWEL SYNDROME WITHOUT DIARRHEA: ICD-10-CM

## 2020-02-24 DIAGNOSIS — E78.1 HYPERTRIGLYCERIDEMIA: ICD-10-CM

## 2020-02-24 DIAGNOSIS — I10 ESSENTIAL HYPERTENSION: Primary | ICD-10-CM

## 2020-02-24 DIAGNOSIS — N40.1 BENIGN NON-NODULAR PROSTATIC HYPERPLASIA WITH LOWER URINARY TRACT SYMPTOMS: ICD-10-CM

## 2020-02-24 DIAGNOSIS — I25.10 ASHD (ARTERIOSCLEROTIC HEART DISEASE): ICD-10-CM

## 2020-02-24 DIAGNOSIS — E78.00 HYPERCHOLESTEREMIA: ICD-10-CM

## 2020-02-24 DIAGNOSIS — M48.061 SPINAL STENOSIS OF LUMBAR REGION, UNSPECIFIED WHETHER NEUROGENIC CLAUDICATION PRESENT: ICD-10-CM

## 2020-02-24 DIAGNOSIS — R53.83 FATIGUE, UNSPECIFIED TYPE: ICD-10-CM

## 2020-02-24 DIAGNOSIS — E11.9 TYPE 2 DIABETES MELLITUS WITHOUT COMPLICATION, WITHOUT LONG-TERM CURRENT USE OF INSULIN (HCC): ICD-10-CM

## 2020-02-24 DIAGNOSIS — G62.9 PERIPHERAL POLYNEUROPATHY: ICD-10-CM

## 2020-02-24 DIAGNOSIS — I34.0 MITRAL VALVE INSUFFICIENCY, UNSPECIFIED ETIOLOGY: ICD-10-CM

## 2020-02-24 DIAGNOSIS — M15.9 PRIMARY OSTEOARTHRITIS INVOLVING MULTIPLE JOINTS: ICD-10-CM

## 2020-02-24 DIAGNOSIS — R29.898 WEAKNESS OF BOTH LOWER EXTREMITIES: ICD-10-CM

## 2020-02-24 PROCEDURE — 99214 OFFICE O/P EST MOD 30 MIN: CPT | Performed by: INTERNAL MEDICINE

## 2020-02-24 NOTE — PROGRESS NOTES
Héctor Shields is a [de-identified]year old male. Patient presents with:  Checkup: 3 month check up. He had a flu shot in October. In January, he felt cough, weakness and fatigue. Finished antibiotics. Felt he had to recover for several weeks. He feels well today.   H daily.  3   • Coenzyme Q10 (CO Q-10) 200 MG Oral Cap Take 1 tablet by mouth daily. • aspirin (ASPIRIN ADULT LOW STRENGTH) 81 MG Oral Tab EC Take 1 tablet by mouth daily.         Past Medical History:   Diagnosis Date   • Allergic rhinitis    • Cataract well today. CPM.  Patient is to continue his current diet, medications and activity. He is to watch his diet more closely regarding his sweets and his carbs.   I will see the patient back in 3 months with blood tests which will include a BMP, hemoglobin A

## 2020-02-24 NOTE — PATIENT INSTRUCTIONS
1.  Patient is to continue his current diet, medication and activity. 2.  Patient is to watch his diet more closely especially regarding his sweets and his carbs. 3.  I will see the patient back in 3 months with blood tests as ordered.   4.  I will see th

## 2020-02-28 ENCOUNTER — OFFICE VISIT (OUTPATIENT)
Dept: OTOLARYNGOLOGY | Facility: CLINIC | Age: 80
End: 2020-02-28
Payer: MEDICARE

## 2020-02-28 VITALS
DIASTOLIC BLOOD PRESSURE: 68 MMHG | SYSTOLIC BLOOD PRESSURE: 140 MMHG | WEIGHT: 198 LBS | BODY MASS INDEX: 31.08 KG/M2 | HEIGHT: 67 IN | TEMPERATURE: 98 F

## 2020-02-28 DIAGNOSIS — J39.8 TRACHEAL STENOSIS: Primary | ICD-10-CM

## 2020-02-28 DIAGNOSIS — H61.22 IMPACTED CERUMEN OF LEFT EAR: ICD-10-CM

## 2020-02-28 DIAGNOSIS — J30.0 VASOMOTOR RHINITIS: ICD-10-CM

## 2020-02-28 PROCEDURE — 92504 EAR MICROSCOPY EXAMINATION: CPT | Performed by: OTOLARYNGOLOGY

## 2020-02-28 PROCEDURE — 99203 OFFICE O/P NEW LOW 30 MIN: CPT | Performed by: OTOLARYNGOLOGY

## 2020-02-28 RX ORDER — IPRATROPIUM BROMIDE 42 UG/1
2 SPRAY, METERED NASAL 4 TIMES DAILY
Qty: 1 BOTTLE | Refills: 3 | Status: SHIPPED | OUTPATIENT
Start: 2020-02-28 | End: 2020-09-24

## 2020-02-28 NOTE — PROGRESS NOTES
Chacha Quintero is a [de-identified]year old male. Patient presents with:  Ear Problem: pt presents with ear wax removal   Nose Problem: nose becomes runny when eating     HPI:   He was noticed to have a large amount of wax in his left ear.   He feels that his hearing Metoprolol Succinate ER 25 MG Oral Tablet 24 Hr Take 25 mg by mouth once daily. 3   • Coenzyme Q10 (CO Q-10) 200 MG Oral Cap Take 1 tablet by mouth daily. • aspirin (ASPIRIN ADULT LOW STRENGTH) 81 MG Oral Tab EC Take 1 tablet by mouth daily.         Pa grossly intact. Neck Exam Normal Inspection - Normal. Palpation - Normal. Parotid gland - Normal. Thyroid gland - Normal.   Psychiatric Normal Orientation - Oriented to time, place, person & situation. Appropriate mood and affect.    Lymph Detail Normal S

## 2020-03-03 ENCOUNTER — TELEPHONE (OUTPATIENT)
Dept: INTERNAL MEDICINE CLINIC | Facility: CLINIC | Age: 80
End: 2020-03-03

## 2020-03-03 NOTE — TELEPHONE ENCOUNTER
To MD:  The above refill request is for a controlled substance. Please review pended medication order. Print and sign for staff to fax to pharmacy or prescribe electronically. Last written 9/5/19: #90 with 1 refill. Per [place]  last dispensed 12/3/19.

## 2020-03-04 RX ORDER — ZOLPIDEM TARTRATE 10 MG/1
TABLET ORAL
Qty: 90 TABLET | Refills: 1 | OUTPATIENT
Start: 2020-03-04

## 2020-03-04 RX ORDER — ZOLPIDEM TARTRATE 10 MG/1
10 TABLET ORAL NIGHTLY PRN
Qty: 90 TABLET | Refills: 1 | Status: SHIPPED | OUTPATIENT
Start: 2020-03-04 | End: 2020-08-29

## 2020-03-04 NOTE — TELEPHONE ENCOUNTER
To MD:  The above refill request is for a controlled substance. Please review pended medication order. Print and sign for staff to fax to pharmacy or prescribe electronically.     Guerrero Iverson  - 12/3/2019 #90/1     Last refilled -  9/5/2019 #90/1

## 2020-03-16 RX ORDER — TAMSULOSIN HYDROCHLORIDE 0.4 MG/1
CAPSULE ORAL
Qty: 90 CAPSULE | Refills: 3 | Status: SHIPPED | OUTPATIENT
Start: 2020-03-16 | End: 2021-03-23

## 2020-03-16 RX ORDER — DUTASTERIDE 0.5 MG/1
CAPSULE, LIQUID FILLED ORAL
Qty: 90 CAPSULE | Refills: 3 | Status: SHIPPED | OUTPATIENT
Start: 2020-03-16 | End: 2021-03-23

## 2020-03-27 ENCOUNTER — TELEPHONE (OUTPATIENT)
Dept: OTOLARYNGOLOGY | Facility: CLINIC | Age: 80
End: 2020-03-27

## 2020-06-02 RX ORDER — METOPROLOL SUCCINATE 25 MG/1
TABLET, EXTENDED RELEASE ORAL
Qty: 90 TABLET | Refills: 1 | Status: SHIPPED | OUTPATIENT
Start: 2020-06-02 | End: 2020-12-01

## 2020-06-16 ENCOUNTER — APPOINTMENT (OUTPATIENT)
Dept: LAB | Age: 80
End: 2020-06-16
Attending: INTERNAL MEDICINE
Payer: MEDICARE

## 2020-06-16 DIAGNOSIS — E78.1 HYPERTRIGLYCERIDEMIA: ICD-10-CM

## 2020-06-16 DIAGNOSIS — R53.83 FATIGUE, UNSPECIFIED TYPE: ICD-10-CM

## 2020-06-16 DIAGNOSIS — E11.9 TYPE 2 DIABETES MELLITUS WITHOUT COMPLICATION, WITHOUT LONG-TERM CURRENT USE OF INSULIN (HCC): ICD-10-CM

## 2020-06-16 DIAGNOSIS — E78.00 HYPERCHOLESTEREMIA: ICD-10-CM

## 2020-06-16 PROCEDURE — 36415 COLL VENOUS BLD VENIPUNCTURE: CPT

## 2020-06-16 PROCEDURE — 80048 BASIC METABOLIC PNL TOTAL CA: CPT

## 2020-06-16 PROCEDURE — 84450 TRANSFERASE (AST) (SGOT): CPT

## 2020-06-16 PROCEDURE — 84460 ALANINE AMINO (ALT) (SGPT): CPT

## 2020-06-16 PROCEDURE — 80061 LIPID PANEL: CPT

## 2020-06-16 PROCEDURE — 83036 HEMOGLOBIN GLYCOSYLATED A1C: CPT

## 2020-06-19 ENCOUNTER — OFFICE VISIT (OUTPATIENT)
Dept: INTERNAL MEDICINE CLINIC | Facility: CLINIC | Age: 80
End: 2020-06-19
Payer: MEDICARE

## 2020-06-19 VITALS
SYSTOLIC BLOOD PRESSURE: 140 MMHG | TEMPERATURE: 97 F | RESPIRATION RATE: 18 BRPM | OXYGEN SATURATION: 99 % | DIASTOLIC BLOOD PRESSURE: 70 MMHG | HEART RATE: 60 BPM | WEIGHT: 196 LBS | BODY MASS INDEX: 31 KG/M2

## 2020-06-19 DIAGNOSIS — I34.0 MITRAL VALVE INSUFFICIENCY, UNSPECIFIED ETIOLOGY: ICD-10-CM

## 2020-06-19 DIAGNOSIS — N18.30 STAGE 3 CHRONIC KIDNEY DISEASE (HCC): ICD-10-CM

## 2020-06-19 DIAGNOSIS — I25.10 ASHD (ARTERIOSCLEROTIC HEART DISEASE): ICD-10-CM

## 2020-06-19 DIAGNOSIS — R53.83 FATIGUE, UNSPECIFIED TYPE: ICD-10-CM

## 2020-06-19 DIAGNOSIS — I10 ESSENTIAL HYPERTENSION: Primary | ICD-10-CM

## 2020-06-19 DIAGNOSIS — M48.061 SPINAL STENOSIS OF LUMBAR REGION, UNSPECIFIED WHETHER NEUROGENIC CLAUDICATION PRESENT: ICD-10-CM

## 2020-06-19 DIAGNOSIS — M15.9 PRIMARY OSTEOARTHRITIS INVOLVING MULTIPLE JOINTS: ICD-10-CM

## 2020-06-19 DIAGNOSIS — K21.9 GASTROESOPHAGEAL REFLUX DISEASE, ESOPHAGITIS PRESENCE NOT SPECIFIED: ICD-10-CM

## 2020-06-19 DIAGNOSIS — N40.1 BENIGN NON-NODULAR PROSTATIC HYPERPLASIA WITH LOWER URINARY TRACT SYMPTOMS: ICD-10-CM

## 2020-06-19 DIAGNOSIS — G62.9 PERIPHERAL POLYNEUROPATHY: ICD-10-CM

## 2020-06-19 DIAGNOSIS — E11.9 TYPE 2 DIABETES MELLITUS WITHOUT COMPLICATION, WITHOUT LONG-TERM CURRENT USE OF INSULIN (HCC): ICD-10-CM

## 2020-06-19 DIAGNOSIS — E78.00 HYPERCHOLESTEREMIA: ICD-10-CM

## 2020-06-19 DIAGNOSIS — K58.9 IRRITABLE BOWEL SYNDROME WITHOUT DIARRHEA: ICD-10-CM

## 2020-06-19 DIAGNOSIS — H61.22 IMPACTED CERUMEN OF LEFT EAR: ICD-10-CM

## 2020-06-19 DIAGNOSIS — E78.1 HYPERTRIGLYCERIDEMIA: ICD-10-CM

## 2020-06-19 PROCEDURE — 99214 OFFICE O/P EST MOD 30 MIN: CPT | Performed by: INTERNAL MEDICINE

## 2020-06-19 PROCEDURE — 69210 REMOVE IMPACTED EAR WAX UNI: CPT | Performed by: INTERNAL MEDICINE

## 2020-06-19 NOTE — PROGRESS NOTES
Chacha Quintero is a [de-identified]year old male. Patient presents with:  Checkup: 3 month checkup. Pt reports he has had a tootheache x2 months. Hypertension  Hyperlipidemia  Arthritis  Diabetes    HPI:   Patient presents with:  Checkup: 3 month checkup.  Pt report mouth once daily. 3   • Coenzyme Q10 (CO Q-10) 200 MG Oral Cap Take 1 tablet by mouth daily. • aspirin (ASPIRIN ADULT LOW STRENGTH) 81 MG Oral Tab EC Take 1 tablet by mouth daily.         Past Medical History:   Diagnosis Date   • Allergic rhinitis clear to auscultation  CARDIO: RRR, normal S1S2, without murmur   GI:Protuberant, BS are present, no organomegaly or palpable masses  EXTREMITIES: no edema  NEURO: alert and oriented  ASSESSMENT AND PLAN:   1. Essential hypertension  Patient is doing well. found to have a wax impaction involving his left ear and left external auditory canal.  At the conclusion of the exam the patient has earwax was removed from the left external auditory canal with a combination of irrigation and use of an ear loop spatula.

## 2020-06-19 NOTE — PATIENT INSTRUCTIONS
1.  Patient is to continue his current diet, medication and activity. 2.  Patient to watch his diet more closely regarding his carbs and sweets. 3.  Patient is to continue his current dose of metformin and glimepiride.   4.  I will plan to see the patient

## 2020-06-25 RX ORDER — LOSARTAN POTASSIUM 100 MG/1
TABLET ORAL
Qty: 90 TABLET | Refills: 3 | Status: SHIPPED | OUTPATIENT
Start: 2020-06-25 | End: 2021-05-24

## 2020-07-22 RX ORDER — CELECOXIB 200 MG/1
200 CAPSULE ORAL DAILY
Qty: 90 CAPSULE | Refills: 3 | Status: SHIPPED | OUTPATIENT
Start: 2020-07-22 | End: 2021-08-15

## 2020-08-07 ENCOUNTER — TELEPHONE (OUTPATIENT)
Dept: INTERNAL MEDICINE CLINIC | Facility: CLINIC | Age: 80
End: 2020-08-07

## 2020-08-07 RX ORDER — LORAZEPAM 0.5 MG/1
TABLET ORAL
Qty: 30 TABLET | Refills: 5 | Status: SHIPPED | OUTPATIENT
Start: 2020-08-07

## 2020-08-10 RX ORDER — DULOXETIN HYDROCHLORIDE 30 MG/1
CAPSULE, DELAYED RELEASE ORAL
Qty: 90 CAPSULE | Refills: 1 | OUTPATIENT
Start: 2020-08-10

## 2020-08-17 RX ORDER — DULOXETIN HYDROCHLORIDE 30 MG/1
30 CAPSULE, DELAYED RELEASE ORAL DAILY
Qty: 90 CAPSULE | Refills: 1 | Status: SHIPPED | OUTPATIENT
Start: 2020-08-17 | End: 2021-02-09

## 2020-08-17 NOTE — TELEPHONE ENCOUNTER
Pt called, requesting RX renewal for:  Duloxetine 30MG  Pt is low on medication  Pt uses [facility] for this refill  Tasked to [employer]

## 2020-08-18 ENCOUNTER — TELEPHONE (OUTPATIENT)
Dept: OTOLARYNGOLOGY | Facility: CLINIC | Age: 80
End: 2020-08-18

## 2020-08-31 RX ORDER — ZOLPIDEM TARTRATE 10 MG/1
TABLET ORAL
Qty: 90 TABLET | Refills: 1 | Status: SHIPPED | OUTPATIENT
Start: 2020-08-31 | End: 2021-02-26

## 2020-08-31 NOTE — TELEPHONE ENCOUNTER
Noted.  I did refill patient's medication as requested. Thank you!!  I did notify the patient's wife that this is been done.

## 2020-09-17 ENCOUNTER — LAB ENCOUNTER (OUTPATIENT)
Dept: LAB | Age: 80
End: 2020-09-17
Attending: INTERNAL MEDICINE
Payer: MEDICARE

## 2020-09-17 DIAGNOSIS — E78.1 HYPERTRIGLYCERIDEMIA: ICD-10-CM

## 2020-09-17 DIAGNOSIS — E11.9 TYPE 2 DIABETES MELLITUS WITHOUT COMPLICATION, WITHOUT LONG-TERM CURRENT USE OF INSULIN (HCC): ICD-10-CM

## 2020-09-17 DIAGNOSIS — E78.00 HYPERCHOLESTEREMIA: ICD-10-CM

## 2020-09-17 LAB
ALT SERPL-CCNC: 39 U/L (ref 16–61)
ANION GAP SERPL CALC-SCNC: 4 MMOL/L (ref 0–18)
AST SERPL-CCNC: 32 U/L (ref 15–37)
BUN BLD-MCNC: 17 MG/DL (ref 7–18)
BUN/CREAT SERPL: 14.3 (ref 10–20)
CALCIUM BLD-MCNC: 9.5 MG/DL (ref 8.5–10.1)
CHLORIDE SERPL-SCNC: 105 MMOL/L (ref 98–112)
CHOLEST SMN-MCNC: 155 MG/DL (ref ?–200)
CO2 SERPL-SCNC: 30 MMOL/L (ref 21–32)
CREAT BLD-MCNC: 1.19 MG/DL (ref 0.7–1.3)
EST. AVERAGE GLUCOSE BLD GHB EST-MCNC: 151 MG/DL (ref 68–126)
GLUCOSE BLD-MCNC: 127 MG/DL (ref 70–99)
HBA1C MFR BLD HPLC: 6.9 % (ref ?–5.7)
HDLC SERPL-MCNC: 29 MG/DL (ref 40–59)
LDLC SERPL CALC-MCNC: 95 MG/DL (ref ?–100)
NONHDLC SERPL-MCNC: 126 MG/DL (ref ?–130)
OSMOLALITY SERPL CALC.SUM OF ELEC: 291 MOSM/KG (ref 275–295)
PATIENT FASTING Y/N/NP: YES
PATIENT FASTING Y/N/NP: YES
POTASSIUM SERPL-SCNC: 4 MMOL/L (ref 3.5–5.1)
SODIUM SERPL-SCNC: 139 MMOL/L (ref 136–145)
TRIGL SERPL-MCNC: 154 MG/DL (ref 30–149)
VLDLC SERPL CALC-MCNC: 31 MG/DL (ref 0–30)

## 2020-09-17 PROCEDURE — 80061 LIPID PANEL: CPT

## 2020-09-17 PROCEDURE — 36415 COLL VENOUS BLD VENIPUNCTURE: CPT

## 2020-09-17 PROCEDURE — 83036 HEMOGLOBIN GLYCOSYLATED A1C: CPT

## 2020-09-17 PROCEDURE — 84460 ALANINE AMINO (ALT) (SGPT): CPT

## 2020-09-17 PROCEDURE — 80048 BASIC METABOLIC PNL TOTAL CA: CPT

## 2020-09-17 PROCEDURE — 84450 TRANSFERASE (AST) (SGOT): CPT

## 2020-09-23 ENCOUNTER — OFFICE VISIT (OUTPATIENT)
Dept: INTERNAL MEDICINE CLINIC | Facility: CLINIC | Age: 80
End: 2020-09-23
Payer: MEDICARE

## 2020-09-23 VITALS
TEMPERATURE: 97 F | DIASTOLIC BLOOD PRESSURE: 64 MMHG | HEART RATE: 64 BPM | OXYGEN SATURATION: 97 % | SYSTOLIC BLOOD PRESSURE: 120 MMHG | BODY MASS INDEX: 31 KG/M2 | WEIGHT: 198 LBS

## 2020-09-23 DIAGNOSIS — N40.1 BENIGN NON-NODULAR PROSTATIC HYPERPLASIA WITH LOWER URINARY TRACT SYMPTOMS: ICD-10-CM

## 2020-09-23 DIAGNOSIS — N18.30 STAGE 3 CHRONIC KIDNEY DISEASE (HCC): ICD-10-CM

## 2020-09-23 DIAGNOSIS — M15.9 PRIMARY OSTEOARTHRITIS INVOLVING MULTIPLE JOINTS: ICD-10-CM

## 2020-09-23 DIAGNOSIS — R53.83 FATIGUE, UNSPECIFIED TYPE: ICD-10-CM

## 2020-09-23 DIAGNOSIS — I25.10 ASHD (ARTERIOSCLEROTIC HEART DISEASE): ICD-10-CM

## 2020-09-23 DIAGNOSIS — K58.9 IRRITABLE BOWEL SYNDROME WITHOUT DIARRHEA: ICD-10-CM

## 2020-09-23 DIAGNOSIS — G62.9 PERIPHERAL POLYNEUROPATHY: ICD-10-CM

## 2020-09-23 DIAGNOSIS — E78.00 HYPERCHOLESTEREMIA: ICD-10-CM

## 2020-09-23 DIAGNOSIS — M48.061 SPINAL STENOSIS OF LUMBAR REGION, UNSPECIFIED WHETHER NEUROGENIC CLAUDICATION PRESENT: ICD-10-CM

## 2020-09-23 DIAGNOSIS — I34.0 MITRAL VALVE INSUFFICIENCY, UNSPECIFIED ETIOLOGY: ICD-10-CM

## 2020-09-23 DIAGNOSIS — I10 ESSENTIAL HYPERTENSION: Primary | ICD-10-CM

## 2020-09-23 DIAGNOSIS — Z00.00 ANNUAL PHYSICAL EXAM: ICD-10-CM

## 2020-09-23 DIAGNOSIS — K21.9 GASTROESOPHAGEAL REFLUX DISEASE, ESOPHAGITIS PRESENCE NOT SPECIFIED: ICD-10-CM

## 2020-09-23 DIAGNOSIS — E11.9 TYPE 2 DIABETES MELLITUS WITHOUT COMPLICATION, WITHOUT LONG-TERM CURRENT USE OF INSULIN (HCC): ICD-10-CM

## 2020-09-23 DIAGNOSIS — E78.1 HYPERTRIGLYCERIDEMIA: ICD-10-CM

## 2020-09-23 PROCEDURE — 3074F SYST BP LT 130 MM HG: CPT | Performed by: INTERNAL MEDICINE

## 2020-09-23 PROCEDURE — 3078F DIAST BP <80 MM HG: CPT | Performed by: INTERNAL MEDICINE

## 2020-09-23 PROCEDURE — 99214 OFFICE O/P EST MOD 30 MIN: CPT | Performed by: INTERNAL MEDICINE

## 2020-09-23 NOTE — PROGRESS NOTES
Donte Ortiz is a 79year old male. Patient presents with:  Checkup: 3 mo f/u  Hypertension  Hyperlipidemia  Arthritis  Diabetes    HPI:   Patient presents with:  Checkup: 3 mo f/u  Hypertension  Hyperlipidemia  Arthritis  Diabetes    Pt feels well.   Wi Depression    • Elevated PSA    • Hemorrhoids    • High triglycerides    • IBS (irritable bowel syndrome)    • No diabetic retinopathy OU 2014   • Osteoarthrosis, unspecified whether generalized or localized, unspecified site    • Spinal stenosis    • Torn will include a CBC, CMP, hemoglobin A1c, lipid panel, and TSH. I will see the patient back sooner as necessary.     2. Hypercholesteremia  Doing well.  CPM.  Patient's recent lipid panel had a cholesterol 155, triglycerides 154, HDL cholesterol is 29 and L

## 2020-09-23 NOTE — PATIENT INSTRUCTIONS
1.  Patient is to continue his current diet, medication and activity. 2.  Patient be given a Pneumovax 23 booster today which is a pneumonia vaccine.   3.  I will see the patient back in 2 or 3 months for his annual physical examination with blood tests, u

## 2020-09-24 ENCOUNTER — TELEPHONE (OUTPATIENT)
Dept: INTERNAL MEDICINE CLINIC | Facility: CLINIC | Age: 80
End: 2020-09-24

## 2020-09-24 RX ORDER — IPRATROPIUM BROMIDE 42 UG/1
2 SPRAY, METERED NASAL 4 TIMES DAILY
Qty: 2 BOTTLE | Refills: 3 | Status: SHIPPED | OUTPATIENT
Start: 2020-09-24 | End: 2021-06-30

## 2020-09-24 NOTE — TELEPHONE ENCOUNTER
Discussion with wife. Refer to Dr John Booth for hearing evaluation. Refer to Dr Carolyn Thibodeaux for back pain.

## 2020-09-30 RX ORDER — OMEPRAZOLE 20 MG/1
CAPSULE, DELAYED RELEASE ORAL
Qty: 90 CAPSULE | Refills: 3 | OUTPATIENT
Start: 2020-09-30

## 2020-09-30 RX ORDER — PRAVASTATIN SODIUM 40 MG
TABLET ORAL
Qty: 90 TABLET | Refills: 3 | Status: SHIPPED | OUTPATIENT
Start: 2020-09-30 | End: 2021-08-15

## 2020-09-30 RX ORDER — AMLODIPINE BESYLATE 10 MG/1
TABLET ORAL
Qty: 90 TABLET | Refills: 3 | Status: SHIPPED | OUTPATIENT
Start: 2020-09-30 | End: 2021-08-15

## 2020-11-24 RX ORDER — OMEPRAZOLE 20 MG/1
CAPSULE, DELAYED RELEASE ORAL
Qty: 90 CAPSULE | Refills: 3 | Status: SHIPPED | OUTPATIENT
Start: 2020-11-24 | End: 2021-03-10 | Stop reason: ALTCHOICE

## 2020-12-01 ENCOUNTER — LAB ENCOUNTER (OUTPATIENT)
Dept: LAB | Age: 80
End: 2020-12-01
Attending: INTERNAL MEDICINE
Payer: MEDICARE

## 2020-12-01 DIAGNOSIS — E11.9 TYPE 2 DIABETES MELLITUS WITHOUT COMPLICATION, WITHOUT LONG-TERM CURRENT USE OF INSULIN (HCC): ICD-10-CM

## 2020-12-01 DIAGNOSIS — E78.1 HYPERTRIGLYCERIDEMIA: ICD-10-CM

## 2020-12-01 DIAGNOSIS — Z00.00 ANNUAL PHYSICAL EXAM: ICD-10-CM

## 2020-12-01 DIAGNOSIS — E78.00 HYPERCHOLESTEREMIA: ICD-10-CM

## 2020-12-01 DIAGNOSIS — R53.83 FATIGUE, UNSPECIFIED TYPE: ICD-10-CM

## 2020-12-01 LAB
ALBUMIN SERPL-MCNC: 4 G/DL
ALBUMIN/GLOB SERPL: 1.2 {RATIO}
ALP SERPL-CCNC: 64 U/L
ALT SERPL-CCNC: 35 UNITS/L
ANION GAP SERPL CALC-SCNC: 5 MMOL/L
AST SERPL-CCNC: 24 UNITS/L
BILIRUB SERPL-MCNC: 0.3 MG/DL
BUN SERPL-MCNC: 21 MG/DL
BUN/CREAT SERPL: 18.4
CALCIUM SERPL-MCNC: 9.5 MG/DL
CHLORIDE SERPL-SCNC: 109 MMOL/L
CHOLEST SERPL-MCNC: 135 MG/DL
CO2 SERPL-SCNC: 29 MMOL/L
CREAT SERPL-MCNC: 1.14 MG/DL
GLOBULIN SER-MCNC: 3.4 G/DL
GLUCOSE SERPL-MCNC: 77 MG/DL
HCT VFR BLD CALC: 42 %
HDLC SERPL-MCNC: 34 MG/DL
HGB BLD-MCNC: 14 G/DL
LDLC SERPL CALC-MCNC: 85 MG/DL
LENGTH OF FAST TIME PATIENT: YES H
LENGTH OF FAST TIME PATIENT: YES H
MCH RBC QN AUTO: 28.6 PG
MCHC RBC AUTO-ENTMCNC: 33.3 G/DL
MCV RBC AUTO: 85.9 FL
NONHDLC SERPL-MCNC: 101 MG/DL
PLATELET # BLD: 202 K/MCL
POTASSIUM SERPL-SCNC: 4.7 MMOL/L
PROT SERPL-MCNC: 7.4 G/DL
RBC # BLD: 4.89 10*6/UL
SODIUM SERPL-SCNC: 143 MMOL/L
TRIGL SERPL-MCNC: 82 MG/DL
TSH SERPL-ACNC: 1.59 MCUNITS/ML
VLDLC SERPL CALC-MCNC: 16 MG/DL
WBC # BLD: 7.9 K/MCL

## 2020-12-01 PROCEDURE — 83036 HEMOGLOBIN GLYCOSYLATED A1C: CPT

## 2020-12-01 PROCEDURE — 81001 URINALYSIS AUTO W/SCOPE: CPT | Performed by: INTERNAL MEDICINE

## 2020-12-01 PROCEDURE — 80061 LIPID PANEL: CPT

## 2020-12-01 PROCEDURE — 36415 COLL VENOUS BLD VENIPUNCTURE: CPT

## 2020-12-01 PROCEDURE — 84443 ASSAY THYROID STIM HORMONE: CPT

## 2020-12-01 PROCEDURE — 80053 COMPREHEN METABOLIC PANEL: CPT

## 2020-12-01 PROCEDURE — 85025 COMPLETE CBC W/AUTO DIFF WBC: CPT

## 2020-12-01 RX ORDER — METOPROLOL SUCCINATE 25 MG/1
TABLET, EXTENDED RELEASE ORAL
Qty: 90 TABLET | Refills: 0 | Status: SHIPPED | OUTPATIENT
Start: 2020-12-01

## 2020-12-09 ENCOUNTER — OFFICE VISIT (OUTPATIENT)
Dept: CARDIOLOGY | Age: 80
End: 2020-12-09

## 2020-12-09 VITALS
RESPIRATION RATE: 18 BRPM | HEIGHT: 68 IN | DIASTOLIC BLOOD PRESSURE: 66 MMHG | SYSTOLIC BLOOD PRESSURE: 134 MMHG | OXYGEN SATURATION: 96 % | HEART RATE: 76 BPM | BODY MASS INDEX: 30.31 KG/M2 | WEIGHT: 200 LBS

## 2020-12-09 DIAGNOSIS — E78.5 DYSLIPIDEMIA: ICD-10-CM

## 2020-12-09 DIAGNOSIS — I25.10 ATHEROSCLEROSIS OF NATIVE CORONARY ARTERY OF NATIVE HEART WITHOUT ANGINA PECTORIS: Primary | ICD-10-CM

## 2020-12-09 PROCEDURE — 99214 OFFICE O/P EST MOD 30 MIN: CPT | Performed by: INTERNAL MEDICINE

## 2020-12-09 PROCEDURE — 3075F SYST BP GE 130 - 139MM HG: CPT | Performed by: INTERNAL MEDICINE

## 2020-12-09 PROCEDURE — 3078F DIAST BP <80 MM HG: CPT | Performed by: INTERNAL MEDICINE

## 2020-12-09 ASSESSMENT — PATIENT HEALTH QUESTIONNAIRE - PHQ9
2. FEELING DOWN, DEPRESSED OR HOPELESS: NOT AT ALL
CLINICAL INTERPRETATION OF PHQ9 SCORE: NO FURTHER SCREENING NEEDED
1. LITTLE INTEREST OR PLEASURE IN DOING THINGS: NOT AT ALL
SUM OF ALL RESPONSES TO PHQ9 QUESTIONS 1 AND 2: 0
SUM OF ALL RESPONSES TO PHQ9 QUESTIONS 1 AND 2: 0
CLINICAL INTERPRETATION OF PHQ2 SCORE: NO FURTHER SCREENING NEEDED

## 2020-12-10 ENCOUNTER — OFFICE VISIT (OUTPATIENT)
Dept: INTERNAL MEDICINE CLINIC | Facility: CLINIC | Age: 80
End: 2020-12-10
Payer: MEDICARE

## 2020-12-10 VITALS
HEIGHT: 67.6 IN | HEART RATE: 72 BPM | WEIGHT: 199.63 LBS | BODY MASS INDEX: 30.61 KG/M2 | SYSTOLIC BLOOD PRESSURE: 140 MMHG | OXYGEN SATURATION: 97 % | DIASTOLIC BLOOD PRESSURE: 70 MMHG | TEMPERATURE: 98 F

## 2020-12-10 DIAGNOSIS — N18.31 STAGE 3A CHRONIC KIDNEY DISEASE (HCC): ICD-10-CM

## 2020-12-10 DIAGNOSIS — E78.00 HYPERCHOLESTEREMIA: ICD-10-CM

## 2020-12-10 DIAGNOSIS — I10 ESSENTIAL HYPERTENSION: ICD-10-CM

## 2020-12-10 DIAGNOSIS — E11.9 TYPE 2 DIABETES MELLITUS WITHOUT COMPLICATION, WITHOUT LONG-TERM CURRENT USE OF INSULIN (HCC): ICD-10-CM

## 2020-12-10 DIAGNOSIS — Z00.00 PHYSICAL EXAM: ICD-10-CM

## 2020-12-10 DIAGNOSIS — G62.9 PERIPHERAL POLYNEUROPATHY: ICD-10-CM

## 2020-12-10 DIAGNOSIS — Z00.00 ANNUAL PHYSICAL EXAM: Primary | ICD-10-CM

## 2020-12-10 DIAGNOSIS — K58.9 IRRITABLE BOWEL SYNDROME WITHOUT DIARRHEA: ICD-10-CM

## 2020-12-10 DIAGNOSIS — I25.10 ASHD (ARTERIOSCLEROTIC HEART DISEASE): ICD-10-CM

## 2020-12-10 DIAGNOSIS — N40.1 BENIGN NON-NODULAR PROSTATIC HYPERPLASIA WITH LOWER URINARY TRACT SYMPTOMS: ICD-10-CM

## 2020-12-10 DIAGNOSIS — I34.0 MITRAL VALVE INSUFFICIENCY, UNSPECIFIED ETIOLOGY: ICD-10-CM

## 2020-12-10 DIAGNOSIS — R41.3 MEMORY PROBLEM: ICD-10-CM

## 2020-12-10 DIAGNOSIS — R94.31 ABNORMAL EKG: ICD-10-CM

## 2020-12-10 DIAGNOSIS — R53.83 FATIGUE, UNSPECIFIED TYPE: ICD-10-CM

## 2020-12-10 DIAGNOSIS — E78.1 HYPERTRIGLYCERIDEMIA: ICD-10-CM

## 2020-12-10 DIAGNOSIS — K21.9 GASTROESOPHAGEAL REFLUX DISEASE, UNSPECIFIED WHETHER ESOPHAGITIS PRESENT: ICD-10-CM

## 2020-12-10 DIAGNOSIS — M48.061 SPINAL STENOSIS OF LUMBAR REGION, UNSPECIFIED WHETHER NEUROGENIC CLAUDICATION PRESENT: ICD-10-CM

## 2020-12-10 DIAGNOSIS — M15.9 PRIMARY OSTEOARTHRITIS INVOLVING MULTIPLE JOINTS: ICD-10-CM

## 2020-12-10 PROCEDURE — 3008F BODY MASS INDEX DOCD: CPT | Performed by: INTERNAL MEDICINE

## 2020-12-10 PROCEDURE — 3077F SYST BP >= 140 MM HG: CPT | Performed by: INTERNAL MEDICINE

## 2020-12-10 PROCEDURE — 3078F DIAST BP <80 MM HG: CPT | Performed by: INTERNAL MEDICINE

## 2020-12-10 PROCEDURE — 99397 PER PM REEVAL EST PAT 65+ YR: CPT | Performed by: INTERNAL MEDICINE

## 2020-12-10 PROCEDURE — G0439 PPPS, SUBSEQ VISIT: HCPCS | Performed by: INTERNAL MEDICINE

## 2020-12-10 PROCEDURE — 96160 PT-FOCUSED HLTH RISK ASSMT: CPT | Performed by: INTERNAL MEDICINE

## 2020-12-10 PROCEDURE — 82272 OCCULT BLD FECES 1-3 TESTS: CPT | Performed by: INTERNAL MEDICINE

## 2020-12-10 PROCEDURE — 93000 ELECTROCARDIOGRAM COMPLETE: CPT | Performed by: INTERNAL MEDICINE

## 2020-12-10 RX ORDER — FAMOTIDINE 20 MG/1
TABLET ORAL
COMMUNITY
Start: 2020-06-15 | End: 2021-03-10

## 2020-12-10 RX ORDER — DONEPEZIL HYDROCHLORIDE 5 MG/1
5 TABLET, FILM COATED ORAL NIGHTLY
Qty: 90 TABLET | Refills: 3 | Status: SHIPPED | OUTPATIENT
Start: 2020-12-10 | End: 2021-10-10

## 2020-12-10 RX ORDER — A/SINGAPORE/GP1908/2015 IVR-180 (AN A/MICHIGAN/45/2015 (H1N1)PDM09-LIKE VIRUS, A/HONG KONG/4801/2014, NYMC X-263B (H3N2) (AN A/HONG KONG/4801/2014-LIKE VIRUS), AND B/BRISBANE/60/2008, WILD TYPE (A B/BRISBANE/60/2008-LIKE VIRUS) 15; 15; 15 UG/.5ML; UG/.5ML; UG/.5ML
INJECTION, SUSPENSION INTRAMUSCULAR
COMMUNITY
Start: 2020-09-02 | End: 2021-03-10 | Stop reason: ALTCHOICE

## 2020-12-10 NOTE — PATIENT INSTRUCTIONS
1.  Patient is to continue his current diet, medication and activity. 2.  Patient flu vaccine. 3.  Patient is to follow-up with Dr. Amelia Torres as he is scheduled. 4.  I will see the patient back in 3 months with blood tests as ordered.   5.  I will see t

## 2020-12-11 NOTE — PROGRESS NOTES
Ricka Collet is a 79year old male who presents for a complete physical exam.   HPI:   Mr. Ricka Collet is an [id number] white male who was seen by me on December 10, 2020 for his Medicare advantage annual physical examination.   At the time of th tablet 5   • metFORMIN HCl 1000 MG Oral Tab Take 1 tablet (1,000 mg total) by mouth 2 (two) times daily with meals. 180 tablet 1   • celecoxib 200 MG Oral Cap Take 1 capsule (200 mg total) by mouth daily.  90 capsule 3   • LOSARTAN 100 MG Oral Tab TAKE 1 TA REVIEW OF SYSTEMS:   GENERAL: feels well   EYES:denies blurred vision or double vision  HEENT: denies nasal congestion, sinus pain or ST  LUNGS: denies shortness of breath or cough  CARDIOVASCULAR: denies chest pain or pressure or palpitations  GI: den cholesterol is 34 and LDL cholesterol is 85. Patient's FBS was 77. His hemoglobin A1c was 6.9. Patient's TSH was 1.59.    ASSESSMENT AND PLAN:   1. Annual physical exam  Patient appears to be doing well at this time.   He is to continue his current diet, lumbar region, unspecified whether neurogenic claudication present  Stable. CPM.    14. Benign non-nodular prostatic hyperplasia with lower urinary tract symptoms  Stable.   CPM.    15. Fatigue, unspecified type  Doing well.  CPM.    16. Stage 3a chronic k Status     Hearing Problems?: No    Vision Problems? : No    Difficulty walking?: No    Difficulty dressing or bathing?: No    Problems with daily activities? : No    Memory Problems?: Yes      Fall/Risk Assessment          Have you fallen in the last 12 m Screening           Prostate Cancer Screening      PSA  Annually PSA due on 11/05/2019  Update Health Maintenance if applicable   Immunizations      Influenza No orders found for this or any previous visit.  Update Immunization Activity if applicable    Pne

## 2020-12-31 RX ORDER — DULOXETIN HYDROCHLORIDE 30 MG/1
CAPSULE, DELAYED RELEASE ORAL
Qty: 90 CAPSULE | Refills: 1 | OUTPATIENT
Start: 2020-12-31

## 2021-01-26 ENCOUNTER — TELEPHONE (OUTPATIENT)
Dept: SCHEDULING | Age: 81
End: 2021-01-26

## 2021-01-29 ENCOUNTER — IMMUNIZATION (OUTPATIENT)
Dept: LAB | Age: 81
End: 2021-01-29

## 2021-01-29 DIAGNOSIS — Z23 NEED FOR VACCINATION: Primary | ICD-10-CM

## 2021-01-29 PROCEDURE — 0011A COVID-19 MODERNA VACCINE: CPT | Performed by: OTOLARYNGOLOGY

## 2021-01-29 PROCEDURE — 91301 COVID-19 MODERNA VACCINE: CPT | Performed by: OTOLARYNGOLOGY

## 2021-02-01 DIAGNOSIS — Z23 NEED FOR VACCINATION: ICD-10-CM

## 2021-02-09 RX ORDER — DULOXETIN HYDROCHLORIDE 30 MG/1
30 CAPSULE, DELAYED RELEASE ORAL DAILY
Qty: 90 CAPSULE | Refills: 3 | Status: SHIPPED | OUTPATIENT
Start: 2021-02-09 | End: 2022-01-01

## 2021-02-09 RX ORDER — GLIMEPIRIDE 2 MG/1
TABLET ORAL
Qty: 90 TABLET | Refills: 3 | Status: SHIPPED | OUTPATIENT
Start: 2021-02-09 | End: 2022-01-28

## 2021-02-26 ENCOUNTER — IMMUNIZATION (OUTPATIENT)
Dept: LAB | Age: 81
End: 2021-02-26

## 2021-02-26 ENCOUNTER — TELEPHONE (OUTPATIENT)
Dept: INTERNAL MEDICINE CLINIC | Facility: CLINIC | Age: 81
End: 2021-02-26

## 2021-02-26 DIAGNOSIS — Z23 NEED FOR VACCINATION: Primary | ICD-10-CM

## 2021-02-26 PROCEDURE — 91301 COVID-19 MODERNA VACCINE: CPT

## 2021-02-26 PROCEDURE — 0012A COVID-19 MODERNA VACCINE: CPT

## 2021-02-26 RX ORDER — ZOLPIDEM TARTRATE 10 MG/1
TABLET ORAL
Qty: 90 TABLET | Refills: 1 | Status: SHIPPED | OUTPATIENT
Start: 2021-02-26 | End: 2021-08-26

## 2021-02-26 NOTE — TELEPHONE ENCOUNTER
To MD:  The above refill request is for a controlled substance. Please review pended medication order. Print and sign for staff to fax to pharmacy or prescribe electronically.     Last office visit: 12/10/2020  Last time refill sent and quantity/refills:

## 2021-03-04 ENCOUNTER — LAB ENCOUNTER (OUTPATIENT)
Dept: LAB | Age: 81
End: 2021-03-04
Attending: INTERNAL MEDICINE
Payer: MEDICARE

## 2021-03-04 DIAGNOSIS — E78.1 HYPERTRIGLYCERIDEMIA: ICD-10-CM

## 2021-03-04 DIAGNOSIS — N18.31 STAGE 3A CHRONIC KIDNEY DISEASE (HCC): ICD-10-CM

## 2021-03-04 DIAGNOSIS — E78.00 HYPERCHOLESTEREMIA: ICD-10-CM

## 2021-03-04 DIAGNOSIS — E11.9 TYPE 2 DIABETES MELLITUS WITHOUT COMPLICATION, WITHOUT LONG-TERM CURRENT USE OF INSULIN (HCC): ICD-10-CM

## 2021-03-04 LAB
ALT SERPL-CCNC: 32 U/L
ANION GAP SERPL CALC-SCNC: 7 MMOL/L (ref 0–18)
AST SERPL-CCNC: 26 U/L (ref 15–37)
BUN BLD-MCNC: 16 MG/DL (ref 7–18)
BUN/CREAT SERPL: 11.8 (ref 10–20)
CALCIUM BLD-MCNC: 9.4 MG/DL (ref 8.5–10.1)
CHLORIDE SERPL-SCNC: 107 MMOL/L (ref 98–112)
CHOLEST SMN-MCNC: 139 MG/DL (ref ?–200)
CO2 SERPL-SCNC: 27 MMOL/L (ref 21–32)
CREAT BLD-MCNC: 1.36 MG/DL
EST. AVERAGE GLUCOSE BLD GHB EST-MCNC: 169 MG/DL (ref 68–126)
GLUCOSE BLD-MCNC: 124 MG/DL (ref 70–99)
HBA1C MFR BLD HPLC: 7.5 % (ref ?–5.7)
HDLC SERPL-MCNC: 28 MG/DL (ref 40–59)
LDLC SERPL CALC-MCNC: 89 MG/DL (ref ?–100)
NONHDLC SERPL-MCNC: 111 MG/DL (ref ?–130)
OSMOLALITY SERPL CALC.SUM OF ELEC: 295 MOSM/KG (ref 275–295)
PATIENT FASTING Y/N/NP: YES
PATIENT FASTING Y/N/NP: YES
POTASSIUM SERPL-SCNC: 3.8 MMOL/L (ref 3.5–5.1)
SODIUM SERPL-SCNC: 141 MMOL/L (ref 136–145)
TRIGL SERPL-MCNC: 108 MG/DL (ref 30–149)
VLDLC SERPL CALC-MCNC: 22 MG/DL (ref 0–30)

## 2021-03-04 PROCEDURE — 84460 ALANINE AMINO (ALT) (SGPT): CPT

## 2021-03-04 PROCEDURE — 84450 TRANSFERASE (AST) (SGOT): CPT

## 2021-03-04 PROCEDURE — 80061 LIPID PANEL: CPT

## 2021-03-04 PROCEDURE — 36415 COLL VENOUS BLD VENIPUNCTURE: CPT

## 2021-03-04 PROCEDURE — 80048 BASIC METABOLIC PNL TOTAL CA: CPT

## 2021-03-04 PROCEDURE — 83036 HEMOGLOBIN GLYCOSYLATED A1C: CPT

## 2021-03-10 ENCOUNTER — OFFICE VISIT (OUTPATIENT)
Dept: INTERNAL MEDICINE CLINIC | Facility: CLINIC | Age: 81
End: 2021-03-10
Payer: MEDICARE

## 2021-03-10 VITALS
TEMPERATURE: 98 F | DIASTOLIC BLOOD PRESSURE: 70 MMHG | HEIGHT: 67.6 IN | OXYGEN SATURATION: 97 % | SYSTOLIC BLOOD PRESSURE: 136 MMHG | BODY MASS INDEX: 30.67 KG/M2 | WEIGHT: 200 LBS | HEART RATE: 84 BPM

## 2021-03-10 DIAGNOSIS — K58.9 IRRITABLE BOWEL SYNDROME WITHOUT DIARRHEA: ICD-10-CM

## 2021-03-10 DIAGNOSIS — K21.9 GASTROESOPHAGEAL REFLUX DISEASE, UNSPECIFIED WHETHER ESOPHAGITIS PRESENT: ICD-10-CM

## 2021-03-10 DIAGNOSIS — R41.3 MEMORY PROBLEM: ICD-10-CM

## 2021-03-10 DIAGNOSIS — I10 ESSENTIAL HYPERTENSION: Primary | ICD-10-CM

## 2021-03-10 DIAGNOSIS — R53.83 FATIGUE, UNSPECIFIED TYPE: ICD-10-CM

## 2021-03-10 DIAGNOSIS — E78.1 HYPERTRIGLYCERIDEMIA: ICD-10-CM

## 2021-03-10 DIAGNOSIS — G62.9 PERIPHERAL POLYNEUROPATHY: ICD-10-CM

## 2021-03-10 DIAGNOSIS — E11.9 TYPE 2 DIABETES MELLITUS WITHOUT COMPLICATION, WITHOUT LONG-TERM CURRENT USE OF INSULIN (HCC): ICD-10-CM

## 2021-03-10 DIAGNOSIS — N40.1 BENIGN NON-NODULAR PROSTATIC HYPERPLASIA WITH LOWER URINARY TRACT SYMPTOMS: ICD-10-CM

## 2021-03-10 DIAGNOSIS — N18.31 STAGE 3A CHRONIC KIDNEY DISEASE (HCC): ICD-10-CM

## 2021-03-10 DIAGNOSIS — E78.00 HYPERCHOLESTEROLEMIA: ICD-10-CM

## 2021-03-10 DIAGNOSIS — I34.0 MITRAL VALVE INSUFFICIENCY, UNSPECIFIED ETIOLOGY: ICD-10-CM

## 2021-03-10 DIAGNOSIS — M15.9 PRIMARY OSTEOARTHRITIS INVOLVING MULTIPLE JOINTS: ICD-10-CM

## 2021-03-10 DIAGNOSIS — M48.061 SPINAL STENOSIS OF LUMBAR REGION, UNSPECIFIED WHETHER NEUROGENIC CLAUDICATION PRESENT: ICD-10-CM

## 2021-03-10 DIAGNOSIS — I25.10 ASHD (ARTERIOSCLEROTIC HEART DISEASE): ICD-10-CM

## 2021-03-10 PROCEDURE — 99214 OFFICE O/P EST MOD 30 MIN: CPT | Performed by: INTERNAL MEDICINE

## 2021-03-10 PROCEDURE — 3078F DIAST BP <80 MM HG: CPT | Performed by: INTERNAL MEDICINE

## 2021-03-10 PROCEDURE — 3008F BODY MASS INDEX DOCD: CPT | Performed by: INTERNAL MEDICINE

## 2021-03-10 PROCEDURE — 3075F SYST BP GE 130 - 139MM HG: CPT | Performed by: INTERNAL MEDICINE

## 2021-03-10 RX ORDER — FAMOTIDINE 20 MG/1
20 TABLET ORAL 2 TIMES DAILY
Qty: 180 TABLET | Refills: 3 | Status: SHIPPED | OUTPATIENT
Start: 2021-03-10 | End: 2022-03-10

## 2021-03-10 RX ORDER — METOPROLOL SUCCINATE 25 MG/1
25 TABLET, EXTENDED RELEASE ORAL DAILY
Qty: 90 TABLET | Refills: 3 | Status: SHIPPED | OUTPATIENT
Start: 2021-03-10 | End: 2022-03-10

## 2021-03-10 NOTE — PATIENT INSTRUCTIONS
1.  Patient is to continue his current diet, medication and activity. 2.  Patient has had his Covid vaccines. 3.  I have renewed patient's metoprolol succinate 25 mg that was started by Dr. Michael Estrada. 4.  I have stopped patient's omeprazole.   I have started

## 2021-03-10 NOTE — PROGRESS NOTES
Hieu Ronquillo is a 79year old male. Patient presents with:  Checkup: 3 months. Ongoing pain in feet, hands, stomach and head. He is having daily headaches. He is very concerned about having pain everyday.  He has not been taking the lorazepam. He is not TABLET DAILY 90 tablet 3   • Ipratropium Bromide 0.06 % Nasal Solution 2 sprays by Nasal route 4 (four) times daily. 2 Bottle 3   • celecoxib 200 MG Oral Cap Take 1 capsule (200 mg total) by mouth daily.  90 capsule 3   • LOSARTAN 100 MG Oral Tab TAKE 1 TAB Position: Sitting, Cuff Size: large)   Pulse 84   Temp 97.6 °F (36.4 °C) (Oral)   Ht 5' 7.6\" (1.717 m)   Wt 200 lb (90.7 kg)   SpO2 97%   BMI 30.77 kg/m²   GENERAL: well developed, well nourished in no acute distress  HEENT: normal oropharynx.  Ears are no long-term current use of insulin (HCC)  Stable. CPM.  Patient's recent . His hemoglobin A1c was 7.5. Patient is to continue his current diet, medication and activity. He is to watch his diet more closely.     8. Gastroesophageal reflux disease, u to be doing better at this time. We will continue to monitor this in the future. The patient indicates understanding of these issues and agrees to the plan. The patient is asked to return in 3 months with blood tests as noted above. Ruben Gregory

## 2021-03-23 RX ORDER — DUTASTERIDE 0.5 MG/1
CAPSULE, LIQUID FILLED ORAL
Qty: 90 CAPSULE | Refills: 3 | Status: SHIPPED | OUTPATIENT
Start: 2021-03-23

## 2021-03-23 RX ORDER — TAMSULOSIN HYDROCHLORIDE 0.4 MG/1
CAPSULE ORAL
Qty: 90 CAPSULE | Refills: 3 | Status: SHIPPED | OUTPATIENT
Start: 2021-03-23

## 2021-05-24 RX ORDER — LOSARTAN POTASSIUM 100 MG/1
TABLET ORAL
Qty: 90 TABLET | Refills: 3 | Status: SHIPPED | OUTPATIENT
Start: 2021-05-24

## 2021-06-01 RX ORDER — METOPROLOL SUCCINATE 25 MG/1
TABLET, EXTENDED RELEASE ORAL
Qty: 90 TABLET | Refills: 1 | OUTPATIENT
Start: 2021-06-01

## 2021-06-07 ENCOUNTER — LAB ENCOUNTER (OUTPATIENT)
Dept: LAB | Age: 81
End: 2021-06-07
Attending: INTERNAL MEDICINE
Payer: MEDICARE

## 2021-06-07 DIAGNOSIS — N18.31 STAGE 3A CHRONIC KIDNEY DISEASE (HCC): ICD-10-CM

## 2021-06-07 DIAGNOSIS — E78.00 HYPERCHOLESTEROLEMIA: ICD-10-CM

## 2021-06-07 DIAGNOSIS — E11.9 TYPE 2 DIABETES MELLITUS WITHOUT COMPLICATION, WITHOUT LONG-TERM CURRENT USE OF INSULIN (HCC): ICD-10-CM

## 2021-06-07 DIAGNOSIS — E78.1 HYPERTRIGLYCERIDEMIA: ICD-10-CM

## 2021-06-07 PROCEDURE — 84450 TRANSFERASE (AST) (SGOT): CPT

## 2021-06-07 PROCEDURE — 80061 LIPID PANEL: CPT

## 2021-06-07 PROCEDURE — 36415 COLL VENOUS BLD VENIPUNCTURE: CPT

## 2021-06-07 PROCEDURE — 83036 HEMOGLOBIN GLYCOSYLATED A1C: CPT

## 2021-06-07 PROCEDURE — 84460 ALANINE AMINO (ALT) (SGPT): CPT

## 2021-06-07 PROCEDURE — 80048 BASIC METABOLIC PNL TOTAL CA: CPT

## 2021-06-10 ENCOUNTER — OFFICE VISIT (OUTPATIENT)
Dept: INTERNAL MEDICINE CLINIC | Facility: CLINIC | Age: 81
End: 2021-06-10
Payer: MEDICARE

## 2021-06-10 VITALS
OXYGEN SATURATION: 98 % | DIASTOLIC BLOOD PRESSURE: 60 MMHG | BODY MASS INDEX: 29.19 KG/M2 | WEIGHT: 190.38 LBS | SYSTOLIC BLOOD PRESSURE: 130 MMHG | HEART RATE: 72 BPM | TEMPERATURE: 98 F | HEIGHT: 67.6 IN

## 2021-06-10 DIAGNOSIS — E11.9 TYPE 2 DIABETES MELLITUS WITHOUT COMPLICATION, WITHOUT LONG-TERM CURRENT USE OF INSULIN (HCC): ICD-10-CM

## 2021-06-10 DIAGNOSIS — E78.1 HYPERTRIGLYCERIDEMIA: ICD-10-CM

## 2021-06-10 DIAGNOSIS — K21.9 GASTROESOPHAGEAL REFLUX DISEASE, UNSPECIFIED WHETHER ESOPHAGITIS PRESENT: ICD-10-CM

## 2021-06-10 DIAGNOSIS — Z00.00 ANNUAL PHYSICAL EXAM: ICD-10-CM

## 2021-06-10 DIAGNOSIS — G62.9 PERIPHERAL POLYNEUROPATHY: ICD-10-CM

## 2021-06-10 DIAGNOSIS — N40.1 BENIGN NON-NODULAR PROSTATIC HYPERPLASIA WITH LOWER URINARY TRACT SYMPTOMS: ICD-10-CM

## 2021-06-10 DIAGNOSIS — I25.10 ASHD (ARTERIOSCLEROTIC HEART DISEASE): ICD-10-CM

## 2021-06-10 DIAGNOSIS — R53.83 FATIGUE, UNSPECIFIED TYPE: ICD-10-CM

## 2021-06-10 DIAGNOSIS — M15.9 PRIMARY OSTEOARTHRITIS INVOLVING MULTIPLE JOINTS: ICD-10-CM

## 2021-06-10 DIAGNOSIS — N18.31 STAGE 3A CHRONIC KIDNEY DISEASE (HCC): ICD-10-CM

## 2021-06-10 DIAGNOSIS — M48.061 SPINAL STENOSIS OF LUMBAR REGION, UNSPECIFIED WHETHER NEUROGENIC CLAUDICATION PRESENT: ICD-10-CM

## 2021-06-10 DIAGNOSIS — I10 ESSENTIAL HYPERTENSION: Primary | ICD-10-CM

## 2021-06-10 DIAGNOSIS — K58.9 IRRITABLE BOWEL SYNDROME WITHOUT DIARRHEA: ICD-10-CM

## 2021-06-10 DIAGNOSIS — R41.3 MEMORY PROBLEM: ICD-10-CM

## 2021-06-10 DIAGNOSIS — E78.00 HYPERCHOLESTEROLEMIA: ICD-10-CM

## 2021-06-10 DIAGNOSIS — I34.0 MITRAL VALVE INSUFFICIENCY, UNSPECIFIED ETIOLOGY: ICD-10-CM

## 2021-06-10 PROCEDURE — 3008F BODY MASS INDEX DOCD: CPT | Performed by: INTERNAL MEDICINE

## 2021-06-10 PROCEDURE — 3075F SYST BP GE 130 - 139MM HG: CPT | Performed by: INTERNAL MEDICINE

## 2021-06-10 PROCEDURE — 3078F DIAST BP <80 MM HG: CPT | Performed by: INTERNAL MEDICINE

## 2021-06-10 PROCEDURE — 99214 OFFICE O/P EST MOD 30 MIN: CPT | Performed by: INTERNAL MEDICINE

## 2021-06-10 RX ORDER — BIOTIN 1 MG
1 TABLET ORAL DAILY
COMMUNITY

## 2021-06-10 RX ORDER — DOCUSATE SODIUM 100 MG/1
100 CAPSULE, LIQUID FILLED ORAL DAILY
COMMUNITY

## 2021-06-10 NOTE — PROGRESS NOTES
Fransico Llanes is a 79year old male. Patient presents with:  Checkup: 3 month  Hypertension  Hyperlipidemia  Arthritis  Diabetes    HPI:   Patient presents with:  Checkup: 3 month  Hypertension  Hyperlipidemia  Arthritis  Diabetes    Pt feels about the s by Nasal route 4 (four) times daily. 2 Bottle 3   • LORAZEPAM 0.5 MG Oral Tab TAKE 1 TABLET BY MOUTH TWICE A DAY AS NEEDED 30 tablet 5   • celecoxib 200 MG Oral Cap Take 1 capsule (200 mg total) by mouth daily.  90 capsule 3   • Coenzyme Q10 (CO Q-10) 200 M heart.  CHEST: Well-developed male. LUNGS: clear to auscultation  CARDIO: RRR, normal S1S2.  2/6 ESM noted at second left ICS with radiation to patient's carotid arteries. 2/6 holosystolic murmur noted at apex with radiation to axilla.   Kristen Mcclain is currently using Pepcid/amlodipine 20 mg orally twice daily. Patient is interested in trying to cut back on the famotidine to 20 mg once a day. 9. Irritable bowel syndrome without diarrhea  Stable.   CPM.    10. Primary osteoarthritis involving multip

## 2021-06-30 RX ORDER — IPRATROPIUM BROMIDE 42 UG/1
SPRAY, METERED NASAL
Qty: 30 ML | Refills: 3 | Status: SHIPPED | OUTPATIENT
Start: 2021-06-30 | End: 2022-01-28

## 2021-07-21 ENCOUNTER — TELEPHONE (OUTPATIENT)
Dept: CARDIOLOGY | Age: 81
End: 2021-07-21

## 2021-07-29 RX ORDER — AMLODIPINE BESYLATE 10 MG/1
TABLET ORAL
Qty: 90 TABLET | Refills: 3 | OUTPATIENT
Start: 2021-07-29

## 2021-07-29 RX ORDER — PRAVASTATIN SODIUM 40 MG
TABLET ORAL
Qty: 90 TABLET | Refills: 3 | OUTPATIENT
Start: 2021-07-29

## 2021-07-29 NOTE — TELEPHONE ENCOUNTER
Current refill request refused due to refill is either a duplicate request or has active refills at the pharmacy. Check previous templates.     Requested Prescriptions     Refused Prescriptions Disp Refills   • PRAVASTATIN 40 MG Oral Tab [Pharmacy Med Name

## 2021-08-10 NOTE — PATIENT INSTRUCTIONS
1.  Patient is to continue his current diet, medication and activity. 2.  Patient is to continue his Lasix at 20 mg orally daily. 3.  I will repeat patient's chest x-ray due to his fatigue and shortness of breath.   4.  I will order complete pulmonary fun Discharge instructions were provided to and discussed with the patient. Questions were addressed and answered. Follow-up care and pain management were also discussed with the patient, and patient verbalized understanding of all the provided information. Patient was discharged to home via private automobile accompanied by self.

## 2021-08-12 ENCOUNTER — OFFICE VISIT (OUTPATIENT)
Dept: OPHTHALMOLOGY | Facility: CLINIC | Age: 81
End: 2021-08-12
Payer: MEDICARE

## 2021-08-12 DIAGNOSIS — H43.393 VITREOUS FLOATERS OF BOTH EYES: ICD-10-CM

## 2021-08-12 DIAGNOSIS — H25.13 AGE-RELATED NUCLEAR CATARACT OF BOTH EYES: ICD-10-CM

## 2021-08-12 DIAGNOSIS — E11.9 DIABETES MELLITUS TYPE 2 WITHOUT RETINOPATHY (HCC): Primary | ICD-10-CM

## 2021-08-12 PROCEDURE — 92014 COMPRE OPH EXAM EST PT 1/>: CPT | Performed by: OPHTHALMOLOGY

## 2021-08-12 NOTE — PROGRESS NOTES
Liv Lin is a 79year old male.     HPI:     HPI     Diabetic Eye Exam      Additional comments: Pt has been a diabetic for 16 years       Pt's diabetes is currently controlled by pills  Pt does not check his BS   Pt's last blood sugar was  134 on 6 mg by mouth daily. • Cholecalciferol (VITAMIN D3) 25 MCG (1000 UT) Oral Cap Take 1 tablet by mouth daily.      • LOSARTAN 100 MG Oral Tab TAKE 1 TABLET DAILY 90 tablet 3   • tamsulosin (FLOMAX) cap TAKE 1 CAPSULE DAILY 90 capsule 3   • DUTASTERIDE 0.5 M Left Right     Full Full          Extraocular Movement       Right Left     Full, Ortho Full, Ortho          Neuro/Psych     Oriented x3: Yes          Dilation     Both eyes: 1.0% Mydriacyl and 2.5% Fredy Synephrine @ 11:08 AM            Slit Lamp and Fundus veil effect. No orders of the defined types were placed in this encounter.       Meds This Visit:  Requested Prescriptions      No prescriptions requested or ordered in this encounter        Follow up instructions:  Return in about 1 year (around 8/12

## 2021-08-15 RX ORDER — AMLODIPINE BESYLATE 10 MG/1
TABLET ORAL
Qty: 90 TABLET | Refills: 3 | Status: SHIPPED | OUTPATIENT
Start: 2021-08-15

## 2021-08-15 RX ORDER — DONEPEZIL HYDROCHLORIDE 5 MG/1
TABLET, FILM COATED ORAL
Qty: 90 TABLET | Refills: 3 | OUTPATIENT
Start: 2021-08-15

## 2021-08-15 RX ORDER — PRAVASTATIN SODIUM 40 MG
TABLET ORAL
Qty: 90 TABLET | Refills: 3 | Status: SHIPPED | OUTPATIENT
Start: 2021-08-15

## 2021-08-15 RX ORDER — CELECOXIB 200 MG/1
CAPSULE ORAL
Qty: 90 CAPSULE | Refills: 3 | Status: SHIPPED | OUTPATIENT
Start: 2021-08-15

## 2021-08-26 ENCOUNTER — TELEPHONE (OUTPATIENT)
Dept: INTERNAL MEDICINE CLINIC | Facility: CLINIC | Age: 81
End: 2021-08-26

## 2021-08-26 RX ORDER — ZOLPIDEM TARTRATE 10 MG/1
TABLET ORAL
Qty: 90 TABLET | Refills: 1 | Status: SHIPPED | OUTPATIENT
Start: 2021-08-26

## 2021-08-26 NOTE — TELEPHONE ENCOUNTER
To MD:  The above refill request is for a controlled substance. Please review pended medication order. Print and sign for staff to fax to pharmacy or prescribe electronically.     Last office visit: 6/10/2021  Last time refill sent and quantity/refills:

## 2021-09-09 ENCOUNTER — LAB ENCOUNTER (OUTPATIENT)
Dept: LAB | Age: 81
End: 2021-09-09
Attending: INTERNAL MEDICINE
Payer: MEDICARE

## 2021-09-09 DIAGNOSIS — E11.9 TYPE 2 DIABETES MELLITUS WITHOUT COMPLICATION, WITHOUT LONG-TERM CURRENT USE OF INSULIN (HCC): ICD-10-CM

## 2021-09-09 DIAGNOSIS — Z00.00 ANNUAL PHYSICAL EXAM: ICD-10-CM

## 2021-09-09 DIAGNOSIS — R53.83 FATIGUE, UNSPECIFIED TYPE: ICD-10-CM

## 2021-09-09 DIAGNOSIS — E78.1 HYPERTRIGLYCERIDEMIA: ICD-10-CM

## 2021-09-09 DIAGNOSIS — E78.00 HYPERCHOLESTEROLEMIA: ICD-10-CM

## 2021-09-09 LAB
ALBUMIN SERPL-MCNC: 3.7 G/DL (ref 3.4–5)
ALBUMIN/GLOB SERPL: 1.3 {RATIO} (ref 1–2)
ALP LIVER SERPL-CCNC: 62 U/L
ALT SERPL-CCNC: 30 U/L
ANION GAP SERPL CALC-SCNC: 4 MMOL/L (ref 0–18)
AST SERPL-CCNC: 22 U/L (ref 15–37)
BASOPHILS # BLD AUTO: 0.01 X10(3) UL (ref 0–0.2)
BASOPHILS NFR BLD AUTO: 0.1 %
BILIRUB SERPL-MCNC: 0.4 MG/DL (ref 0.1–2)
BILIRUB UR QL: NEGATIVE
BUN BLD-MCNC: 19 MG/DL (ref 7–18)
BUN/CREAT SERPL: 18.4 (ref 10–20)
CALCIUM BLD-MCNC: 9 MG/DL (ref 8.5–10.1)
CHLORIDE SERPL-SCNC: 108 MMOL/L (ref 98–112)
CHOLEST SMN-MCNC: 142 MG/DL (ref ?–200)
CLARITY UR: CLEAR
CO2 SERPL-SCNC: 28 MMOL/L (ref 21–32)
COLOR UR: YELLOW
CREAT BLD-MCNC: 1.03 MG/DL
DEPRECATED RDW RBC AUTO: 44 FL (ref 35.1–46.3)
EOSINOPHIL # BLD AUTO: 0.24 X10(3) UL (ref 0–0.7)
EOSINOPHIL NFR BLD AUTO: 3.6 %
ERYTHROCYTE [DISTWIDTH] IN BLOOD BY AUTOMATED COUNT: 13.6 % (ref 11–15)
EST. AVERAGE GLUCOSE BLD GHB EST-MCNC: 123 MG/DL (ref 68–126)
GLOBULIN PLAS-MCNC: 2.8 G/DL (ref 2.8–4.4)
GLUCOSE BLD-MCNC: 92 MG/DL (ref 70–99)
GLUCOSE UR-MCNC: NEGATIVE MG/DL
HBA1C MFR BLD HPLC: 5.9 % (ref ?–5.7)
HCT VFR BLD AUTO: 41.9 %
HDLC SERPL-MCNC: 31 MG/DL (ref 40–59)
HGB BLD-MCNC: 13.5 G/DL
HGB UR QL STRIP.AUTO: NEGATIVE
IMM GRANULOCYTES # BLD AUTO: 0.02 X10(3) UL (ref 0–1)
IMM GRANULOCYTES NFR BLD: 0.3 %
KETONES UR-MCNC: NEGATIVE MG/DL
LDLC SERPL CALC-MCNC: 91 MG/DL (ref ?–100)
LEUKOCYTE ESTERASE UR QL STRIP.AUTO: NEGATIVE
LYMPHOCYTES # BLD AUTO: 2.16 X10(3) UL (ref 1–4)
LYMPHOCYTES NFR BLD AUTO: 32.2 %
M PROTEIN MFR SERPL ELPH: 6.5 G/DL (ref 6.4–8.2)
MCH RBC QN AUTO: 28.3 PG (ref 26–34)
MCHC RBC AUTO-ENTMCNC: 32.2 G/DL (ref 31–37)
MCV RBC AUTO: 87.8 FL
MONOCYTES # BLD AUTO: 0.61 X10(3) UL (ref 0.1–1)
MONOCYTES NFR BLD AUTO: 9.1 %
NEUTROPHILS # BLD AUTO: 3.66 X10 (3) UL (ref 1.5–7.7)
NEUTROPHILS # BLD AUTO: 3.66 X10(3) UL (ref 1.5–7.7)
NEUTROPHILS NFR BLD AUTO: 54.7 %
NITRITE UR QL STRIP.AUTO: NEGATIVE
NONHDLC SERPL-MCNC: 111 MG/DL (ref ?–130)
OSMOLALITY SERPL CALC.SUM OF ELEC: 292 MOSM/KG (ref 275–295)
PATIENT FASTING Y/N/NP: YES
PATIENT FASTING Y/N/NP: YES
PH UR: 6 [PH] (ref 5–8)
PLATELET # BLD AUTO: 110 10(3)UL (ref 150–450)
POTASSIUM SERPL-SCNC: 4.2 MMOL/L (ref 3.5–5.1)
PROT UR-MCNC: NEGATIVE MG/DL
RBC # BLD AUTO: 4.77 X10(6)UL
SODIUM SERPL-SCNC: 140 MMOL/L (ref 136–145)
SP GR UR STRIP: 1.02 (ref 1–1.03)
TRIGL SERPL-MCNC: 107 MG/DL (ref 30–149)
TSI SER-ACNC: 1.17 MIU/ML (ref 0.36–3.74)
UROBILINOGEN UR STRIP-ACNC: <2
VLDLC SERPL CALC-MCNC: 17 MG/DL (ref 0–30)
WBC # BLD AUTO: 6.7 X10(3) UL (ref 4–11)

## 2021-09-09 PROCEDURE — 36415 COLL VENOUS BLD VENIPUNCTURE: CPT

## 2021-09-09 PROCEDURE — 84443 ASSAY THYROID STIM HORMONE: CPT

## 2021-09-09 PROCEDURE — 81003 URINALYSIS AUTO W/O SCOPE: CPT | Performed by: INTERNAL MEDICINE

## 2021-09-09 PROCEDURE — 80053 COMPREHEN METABOLIC PANEL: CPT

## 2021-09-09 PROCEDURE — 83036 HEMOGLOBIN GLYCOSYLATED A1C: CPT

## 2021-09-09 PROCEDURE — 80061 LIPID PANEL: CPT

## 2021-09-09 PROCEDURE — 85025 COMPLETE CBC W/AUTO DIFF WBC: CPT

## 2021-09-23 ENCOUNTER — OFFICE VISIT (OUTPATIENT)
Dept: INTERNAL MEDICINE CLINIC | Facility: CLINIC | Age: 81
End: 2021-09-23
Payer: MEDICARE

## 2021-09-23 VITALS
SYSTOLIC BLOOD PRESSURE: 130 MMHG | DIASTOLIC BLOOD PRESSURE: 60 MMHG | HEIGHT: 67.6 IN | BODY MASS INDEX: 28.52 KG/M2 | HEART RATE: 56 BPM | OXYGEN SATURATION: 99 % | WEIGHT: 186 LBS

## 2021-09-23 DIAGNOSIS — I10 ESSENTIAL HYPERTENSION: ICD-10-CM

## 2021-09-23 DIAGNOSIS — E78.1 HYPERTRIGLYCERIDEMIA: ICD-10-CM

## 2021-09-23 DIAGNOSIS — I34.0 MITRAL VALVE INSUFFICIENCY, UNSPECIFIED ETIOLOGY: ICD-10-CM

## 2021-09-23 DIAGNOSIS — G62.9 PERIPHERAL POLYNEUROPATHY: ICD-10-CM

## 2021-09-23 DIAGNOSIS — E11.9 TYPE 2 DIABETES MELLITUS WITHOUT COMPLICATION, WITHOUT LONG-TERM CURRENT USE OF INSULIN (HCC): ICD-10-CM

## 2021-09-23 DIAGNOSIS — D69.6 THROMBOCYTOPENIA (HCC): ICD-10-CM

## 2021-09-23 DIAGNOSIS — I35.0 AORTIC VALVE STENOSIS, ETIOLOGY OF CARDIAC VALVE DISEASE UNSPECIFIED: ICD-10-CM

## 2021-09-23 DIAGNOSIS — E78.00 HYPERCHOLESTEROLEMIA: ICD-10-CM

## 2021-09-23 DIAGNOSIS — M48.061 SPINAL STENOSIS OF LUMBAR REGION, UNSPECIFIED WHETHER NEUROGENIC CLAUDICATION PRESENT: ICD-10-CM

## 2021-09-23 DIAGNOSIS — Z00.00 ANNUAL PHYSICAL EXAM: Primary | ICD-10-CM

## 2021-09-23 DIAGNOSIS — I25.10 ASHD (ARTERIOSCLEROTIC HEART DISEASE): ICD-10-CM

## 2021-09-23 DIAGNOSIS — R94.31 ABNORMAL EKG: ICD-10-CM

## 2021-09-23 DIAGNOSIS — K21.9 GASTROESOPHAGEAL REFLUX DISEASE, UNSPECIFIED WHETHER ESOPHAGITIS PRESENT: ICD-10-CM

## 2021-09-23 DIAGNOSIS — N18.31 STAGE 3A CHRONIC KIDNEY DISEASE (HCC): ICD-10-CM

## 2021-09-23 DIAGNOSIS — K58.9 IRRITABLE BOWEL SYNDROME WITHOUT DIARRHEA: ICD-10-CM

## 2021-09-23 DIAGNOSIS — M15.9 PRIMARY OSTEOARTHRITIS INVOLVING MULTIPLE JOINTS: ICD-10-CM

## 2021-09-23 DIAGNOSIS — N40.1 BENIGN NON-NODULAR PROSTATIC HYPERPLASIA WITH LOWER URINARY TRACT SYMPTOMS: ICD-10-CM

## 2021-09-23 DIAGNOSIS — R41.3 MEMORY PROBLEM: ICD-10-CM

## 2021-09-23 DIAGNOSIS — R53.83 FATIGUE, UNSPECIFIED TYPE: ICD-10-CM

## 2021-09-23 LAB
OCCULT BLOOD, STOOL 1: NEGATIVE
PERFORMANCE MONITORS CORRECT (YES/NO): YES YES/NO

## 2021-09-23 PROCEDURE — 93000 ELECTROCARDIOGRAM COMPLETE: CPT | Performed by: INTERNAL MEDICINE

## 2021-09-23 PROCEDURE — 99397 PER PM REEVAL EST PAT 65+ YR: CPT | Performed by: INTERNAL MEDICINE

## 2021-09-23 PROCEDURE — 96160 PT-FOCUSED HLTH RISK ASSMT: CPT | Performed by: INTERNAL MEDICINE

## 2021-09-23 PROCEDURE — 3008F BODY MASS INDEX DOCD: CPT | Performed by: INTERNAL MEDICINE

## 2021-09-23 PROCEDURE — G0439 PPPS, SUBSEQ VISIT: HCPCS | Performed by: INTERNAL MEDICINE

## 2021-09-23 PROCEDURE — 82272 OCCULT BLD FECES 1-3 TESTS: CPT | Performed by: INTERNAL MEDICINE

## 2021-09-23 PROCEDURE — 3078F DIAST BP <80 MM HG: CPT | Performed by: INTERNAL MEDICINE

## 2021-09-23 PROCEDURE — 3075F SYST BP GE 130 - 139MM HG: CPT | Performed by: INTERNAL MEDICINE

## 2021-09-23 NOTE — PROGRESS NOTES
Ricka Collet is a 79year old male who presents for a complete physical exam.   HPI:   Flavia Taylor is an [id number] white male who was seen by me on September 23, 2021 for his Medicare advantage annual physical examination.   At the time of exam docusate sodium 100 MG Oral Cap Take 100 mg by mouth daily. • Cholecalciferol (VITAMIN D3) 25 MCG (1000 UT) Oral Cap Take 1 tablet by mouth daily.      • LOSARTAN 100 MG Oral Tab TAKE 1 TABLET DAILY 90 tablet 3   • tamsulosin (FLOMAX) cap TAKE 1 CAPSULE Brother    • Heart Attack Daughter 54   • Glaucoma Neg    • Macular degeneration Neg       Social History:  Social History    Tobacco Use      Smoking status: Never Smoker      Smokeless tobacco: Never Used    Vaping Use      Vaping Use: Never used    [place] bradycardia of about 56 bpm.  Its axis is a 70 degree angle. EKG has a right bundle branch block. EKG is similar to patient's previous EKG. Patient CBC has a platelet count of 650,748.   Remainder the CBC has a hemoglobin of 13.5, hematocrit of 41.9 an Hypertriglyceridemia  Doing well.  CPM.  Patient's recent triglyceride reading was 107. As above. - LIPID PANEL; Future  - AST (SGOT); Future  - ALT (SGPT); Future    5.  ASHD (arteriosclerotic heart disease)  Doing well.  CPM.    6. Mitral valve insuff was 19, creatinine is 1.03 and GFR was 67. CPM.    17. Memory problem  Patient appears to doing well at this time. CPM.    18. Thrombocytopenia ([facility] [address]  Patient's recent CBC had a platelet count of 761,063.   This is lower than it was at the time of the pat Assessment          Have you fallen in the last 12 months?: 0-No                                        Fall/Risk Scorin          Depression Screening (PHQ-2/PHQ-9): Over the LAST 2 WEEKS                      Advance Directives     Do you have a health Prostate Cancer Screening      PSA  Annually PSA due on 11/05/2019  Update Health Maintenance if applicable   Immunizations      Influenza No orders found for this or any previous visit.  Update Immunization Activity if applicable    Pneumococcal Orders p

## 2021-09-23 NOTE — PATIENT INSTRUCTIONS
1.  Patient is to continue his current diet, medication and activity. Patient is especially to follow his excellent job following his diet and getting exercise. 2.  Patient has had his flu vaccine.   3.  Patient is to get his Covid booster vaccine when it

## 2021-10-10 RX ORDER — DONEPEZIL HYDROCHLORIDE 5 MG/1
TABLET, FILM COATED ORAL
Qty: 90 TABLET | Refills: 3 | Status: SHIPPED | OUTPATIENT
Start: 2021-10-10

## 2021-11-04 ENCOUNTER — APPOINTMENT (OUTPATIENT)
Dept: URBAN - METROPOLITAN AREA CLINIC 321 | Age: 81
Setting detail: DERMATOLOGY
End: 2021-11-04

## 2021-11-04 DIAGNOSIS — L81.4 OTHER MELANIN HYPERPIGMENTATION: ICD-10-CM

## 2021-11-04 DIAGNOSIS — Z85.828 PERSONAL HISTORY OF OTHER MALIGNANT NEOPLASM OF SKIN: ICD-10-CM

## 2021-11-04 DIAGNOSIS — L82.1 OTHER SEBORRHEIC KERATOSIS: ICD-10-CM

## 2021-11-04 DIAGNOSIS — D22 MELANOCYTIC NEVI: ICD-10-CM

## 2021-11-04 DIAGNOSIS — Z86.007 PERSONAL HISTORY OF IN-SITU NEOPLASM OF SKIN: ICD-10-CM

## 2021-11-04 DIAGNOSIS — L57.0 ACTINIC KERATOSIS: ICD-10-CM

## 2021-11-04 PROBLEM — D22.5 MELANOCYTIC NEVI OF TRUNK: Status: ACTIVE | Noted: 2021-11-04

## 2021-11-04 PROBLEM — C44.311 BASAL CELL CARCINOMA OF SKIN OF NOSE: Status: ACTIVE | Noted: 2021-11-04

## 2021-11-04 PROCEDURE — 17003 DESTRUCT PREMALG LES 2-14: CPT

## 2021-11-04 PROCEDURE — OTHER CONSULTATION FOR RADIOTHERAPY: OTHER

## 2021-11-04 PROCEDURE — 17000 DESTRUCT PREMALG LESION: CPT

## 2021-11-04 PROCEDURE — OTHER COUNSELING: OTHER

## 2021-11-04 PROCEDURE — 99213 OFFICE O/P EST LOW 20 MIN: CPT | Mod: 25

## 2021-11-04 PROCEDURE — OTHER LIQUID NITROGEN: OTHER

## 2021-11-04 ASSESSMENT — LOCATION SIMPLE DESCRIPTION DERM
LOCATION SIMPLE: LEFT UPPER BACK
LOCATION SIMPLE: LEFT LIP
LOCATION SIMPLE: LEFT UPPER BACK
LOCATION SIMPLE: CHEST
LOCATION SIMPLE: RIGHT UPPER BACK
LOCATION SIMPLE: RIGHT PRETIBIAL REGION
LOCATION SIMPLE: RIGHT TEMPLE
LOCATION SIMPLE: LEFT CHEEK

## 2021-11-04 ASSESSMENT — LOCATION DETAILED DESCRIPTION DERM
LOCATION DETAILED: RIGHT SUPERIOR MEDIAL UPPER BACK
LOCATION DETAILED: LEFT SUPERIOR MEDIAL UPPER BACK
LOCATION DETAILED: UPPER STERNUM
LOCATION DETAILED: LEFT SUPERIOR CENTRAL MALAR CHEEK
LOCATION DETAILED: RIGHT MID TEMPLE
LOCATION DETAILED: LEFT UPPER CUTANEOUS LIP
LOCATION DETAILED: RIGHT PROXIMAL PRETIBIAL REGION
LOCATION DETAILED: LEFT SUPERIOR MEDIAL UPPER BACK
LOCATION DETAILED: LEFT MEDIAL UPPER BACK

## 2021-11-04 ASSESSMENT — LOCATION ZONE DERM
LOCATION ZONE: FACE
LOCATION ZONE: FACE
LOCATION ZONE: TRUNK
LOCATION ZONE: TRUNK
LOCATION ZONE: LEG
LOCATION ZONE: LIP

## 2021-11-04 NOTE — PROCEDURE: CONSULTATION FOR RADIOTHERAPY
Detail Level: Simple
Body Location Override (Optional - Billing Will Still Be Based On Selected Body Map Location If Applicable): L ala
X Size Of Lesion In Cm (Optional): 0

## 2021-11-04 NOTE — PROCEDURE: LIQUID NITROGEN
Duration Of Freeze Thaw-Cycle (Seconds): 0
Total Number Of Aks Treated: 5
Detail Level: Zone
Render In Bullet Format When Appropriate: No
Post-Care Instructions: I reviewed with the patient in detail post-care instructions. Patient is to wear sunprotection, and avoid picking at any of the treated lesions. Pt may apply Vaseline to crusted or scabbing areas.
Consent: The patient's consent was obtained including but not limited to risks of crusting, scabbing, blistering, scarring, darker or lighter pigmentary change, recurrence, incomplete removal and infection.

## 2021-11-04 NOTE — HPI: NON-MELANOMA SKIN CANCER F/U (HISTORY OF NMSC)
What Is The Reason For Today's Visit?: Follow Up Non-Melanoma Skin Cancer
How Many Skin Cancers Have You Had?: more than one
When Was Your Last Cancer Diagnosed?: 3/2021

## 2021-11-10 ENCOUNTER — APPOINTMENT (OUTPATIENT)
Dept: URBAN - METROPOLITAN AREA CLINIC 321 | Age: 81
Setting detail: DERMATOLOGY
End: 2021-11-11

## 2021-11-10 PROBLEM — C44.311 BASAL CELL CARCINOMA OF SKIN OF NOSE: Status: ACTIVE | Noted: 2021-11-10

## 2021-11-10 PROCEDURE — 77261 THER RADIOLOGY TX PLNG SMPL: CPT

## 2021-11-10 PROCEDURE — OTHER SUPERFICIAL RADIATION TREATMENT: OTHER

## 2021-11-10 PROCEDURE — 77300 RADIATION THERAPY DOSE PLAN: CPT

## 2021-11-10 PROCEDURE — 77280 THER RAD SIMULAJ FIELD SMPL: CPT

## 2021-11-10 PROCEDURE — G6001 ECHO GUIDANCE RADIOTHERAPY: HCPCS

## 2021-11-10 PROCEDURE — 99213 OFFICE O/P EST LOW 20 MIN: CPT | Mod: 25

## 2021-11-10 PROCEDURE — OTHER FOLLOW UP FOR NEXT VISIT: OTHER

## 2021-11-10 PROCEDURE — OTHER TREATMENT REGIMEN: OTHER

## 2021-11-10 PROCEDURE — 77334 RADIATION TREATMENT AID(S): CPT

## 2021-11-10 NOTE — PROCEDURE: SUPERFICIAL RADIATION TREATMENT
Bill For Simulation And Treatment Device Design: Yes - (Simple Simulation: 11927) Bill For Simulation And Treatment Device Design: Yes - (Simple Simulation: 31045)

## 2021-11-10 NOTE — PROCEDURE: SUPERFICIAL RADIATION TREATMENT
Computed Treatment Time In Min (Will Render The Same As Calculated Treatment Time If Left Blank): .45

## 2021-11-10 NOTE — PROCEDURE: TREATMENT REGIMEN
Plan: Per the request of Dr. Philip, patient was seen today for Superficial Radiation Therapy requiring simulation (CPT® 49261) in preparation for treatment of specific diseased sites. Simulation is necessary to determine correct patient and treatment portal positioning, deliver safe and effective radiation therapy. A high frequency ultrasound image was acquired prior to treatment today for three-dimensional evaluation of tumor volume and response to treatment, in addition, geometric accuracy of field placement (CPT® ). Physician evaluation of the ultrasound tumor depth will be ongoing through course of treatment and is deemed medically necessary ensuring efficacy of treatment. Today’s image and setup was evaluated determining continuation of treatment with the current plan, or necessary changes as appropriate. All appropriate custom blocking and treatment parameters verified by radiation therapist according to initial simulation.\\n\\nUS image guidance and field placement prior to treatment delivery performed. US depth is 1.55mm.\\n\\nPer Dr. Philip, continued daily US guidance and simulation is required for field placement, measurement of tumor depth, progress and edema monitoring.\\n\\nPer the request of Dr. Philip, continuing medical physics review as per radiotherapy standard of care post every 5th fraction for patient, including assessment of treatment parameters,  of dose delivery, and review of patient treatment documentation in support of the provider, is ordered, ensuring efficacy and continued safe delivery of radiotherapy. Included in physics check is review of patient setup information, all pertinent simulation and treatment photographs checks, prescription, dose calculation verification, daily dose charted correctly, elapsed days and treatment days correctly charted, cumulative dose correct, and review of any prescription changes. Continued medical physics review post every 5th fraction of therapy is requested by provider for appropriate radiotherapy management and is deemed medically necessary and standard of care.\\n\\n\\n Plan: Per the request of Dr. Philip, patient was seen today for Superficial Radiation Therapy requiring simulation (CPT® 02343) in preparation for treatment of specific diseased sites. Simulation is necessary to determine correct patient and treatment portal positioning, deliver safe and effective radiation therapy. A high frequency ultrasound image was acquired prior to treatment today for three-dimensional evaluation of tumor volume and response to treatment, in addition, geometric accuracy of field placement (CPT® ). Physician evaluation of the ultrasound tumor depth will be ongoing through course of treatment and is deemed medically necessary ensuring efficacy of treatment. Today’s image and setup was evaluated determining continuation of treatment with the current plan, or necessary changes as appropriate. All appropriate custom blocking and treatment parameters verified by radiation therapist according to initial simulation.\\n\\nUS image guidance and field placement prior to treatment delivery performed. US depth is 1.55mm.\\n\\nPer Dr. Philip, continued daily US guidance and simulation is required for field placement, measurement of tumor depth, progress and edema monitoring.\\n\\nPer the request of Dr. Philip, continuing medical physics review as per radiotherapy standard of care post every 5th fraction for patient, including assessment of treatment parameters,  of dose delivery, and review of patient treatment documentation in support of the provider, is ordered, ensuring efficacy and continued safe delivery of radiotherapy. Included in physics check is review of patient setup information, all pertinent simulation and treatment photographs checks, prescription, dose calculation verification, daily dose charted correctly, elapsed days and treatment days correctly charted, cumulative dose correct, and review of any prescription changes. Continued medical physics review post every 5th fraction of therapy is requested by provider for appropriate radiotherapy management and is deemed medically necessary and standard of care.\\n\\n\\n

## 2021-11-15 ENCOUNTER — APPOINTMENT (OUTPATIENT)
Dept: URBAN - METROPOLITAN AREA CLINIC 321 | Age: 81
Setting detail: DERMATOLOGY
End: 2021-11-16

## 2021-11-15 PROBLEM — C44.311 BASAL CELL CARCINOMA OF SKIN OF NOSE: Status: ACTIVE | Noted: 2021-11-15

## 2021-11-15 PROCEDURE — OTHER FOLLOW UP FOR NEXT VISIT: OTHER

## 2021-11-15 PROCEDURE — OTHER TREATMENT REGIMEN: OTHER

## 2021-11-15 PROCEDURE — 77401 RADIATION TX DELIVERY SUPFC: CPT

## 2021-11-15 PROCEDURE — G6001 ECHO GUIDANCE RADIOTHERAPY: HCPCS

## 2021-11-15 PROCEDURE — OTHER SUPERFICIAL RADIATION TREATMENT: OTHER

## 2021-11-15 PROCEDURE — 77280 THER RAD SIMULAJ FIELD SMPL: CPT

## 2021-11-15 NOTE — PROCEDURE: SUPERFICIAL RADIATION TREATMENT
Bill For Simulation And Treatment Device Design: Yes - (Simple Simulation: 07089) Bill For Simulation And Treatment Device Design: Yes - (Simple Simulation: 35978)

## 2021-11-15 NOTE — PROCEDURE: TREATMENT REGIMEN
Detail Level: Zone
Plan: This patient has been treated today with image guided superficial radiation therapy for non-melanoma skin cancer. Written informed consent has been previously obtained from this patient for this treatment. This consent is documented in the patient’s chart. The patient gave verbal consent to continue treatment today. The patient was treated with a specific radiation dose and setup as prescribed by the provider listed on this visit note. A Radiation Therapist performed administration of radiation under supervision of provider. The treatment parameters and cumulative dose are indicated above. Prior to administering the radiation, the patient underwent a verification therapeutic radiology simulation-aided field setting defining relevant normal and abnormal target anatomy and acquiring images with high frequency ultrasound in addition to data necessary developing optimal radiation treatment process for the patient. This process includes verification of the treatment port(s) and proper treatment positioning. All treatment ports were photographed within electronic medical record. The patient’s customized lead blocking along with gross tumor volume and margin was confirmed. Considering superficial radiotherapy is clinical in setup, this requires physician and radiation therapist to clarify location interest being treated against initial images, pathology and patient anatomy. Care was taken ensuring fields treated were geometrically accurate and properly positioned using therapeutic radiology simulation-aided field setting verification per fraction. This process is also utilized to determine if any prescription or setup changes are necessary. These steps are therefore medically necessary ensuring safe and effective administration of radiation. Ongoing therapeutic radiology simulation-aided field setting verification is ordered throughout course of therapy.\\nA high frequency ultrasound image was acquired today for two-dimensional evaluation of the tumor volume and response to treatment, in addition to geometric accuracy of field placement. US depth Is 1.27mm, which is .28mm in difference from previous imaging. The field placement and ultrasound imaging, per fraction, is separate and distinct from the initial simulation, and is an important task in providing safe administration of superficial radiation therapy. Physician evaluation of the ultrasound tumor depth will be ongoing throughout the course of treatment and is deemed medically necessary in order to ensure the efficacy of treatment and any necessary changes. Today’s image was evaluated for determination of continuation of treatment with the current plan or with necessary changes as appropriate. According to provider review of verification therapeutic radiology simulation-aided field setting and imaging, no change is required. Additionally, the use of ultrasound visualization and targeted assessment allows the patient to be able to see their cancer(s) progress, encouraging patient to complete and maintain compliance through full course of radiotherapy. Per Dr. Philip, continued ultrasound guidance and therapeutic radiology simulation-aided field setting verification per fraction is required for field placement, measurement of tumor depth, progress and acute effect monitoring. \\n

## 2021-11-17 ENCOUNTER — APPOINTMENT (OUTPATIENT)
Dept: URBAN - METROPOLITAN AREA CLINIC 321 | Age: 81
Setting detail: DERMATOLOGY
End: 2021-11-18

## 2021-11-17 PROBLEM — C44.311 BASAL CELL CARCINOMA OF SKIN OF NOSE: Status: ACTIVE | Noted: 2021-11-17

## 2021-11-17 PROCEDURE — OTHER FOLLOW UP FOR NEXT VISIT: OTHER

## 2021-11-17 PROCEDURE — G6001 ECHO GUIDANCE RADIOTHERAPY: HCPCS

## 2021-11-17 PROCEDURE — 77280 THER RAD SIMULAJ FIELD SMPL: CPT

## 2021-11-17 PROCEDURE — OTHER SUPERFICIAL RADIATION TREATMENT: OTHER

## 2021-11-17 PROCEDURE — 77401 RADIATION TX DELIVERY SUPFC: CPT

## 2021-11-17 PROCEDURE — OTHER TREATMENT REGIMEN: OTHER

## 2021-11-17 NOTE — PROCEDURE: TREATMENT REGIMEN
Plan: This patient has been treated today with image guided superficial radiation therapy for non-melanoma skin cancer. Written informed consent has been previously obtained from this patient for this treatment. This consent is documented in the patient’s chart. The patient gave verbal consent to continue treatment today. The patient was treated with a specific radiation dose and setup as prescribed by the provider listed on this visit note. A Radiation Therapist performed administration of radiation under supervision of provider. The treatment parameters and cumulative dose are indicated above. Prior to administering the radiation, the patient underwent a verification therapeutic radiology simulation-aided field setting defining relevant normal and abnormal target anatomy and acquiring images with high frequency ultrasound in addition to data necessary developing optimal radiation treatment process for the patient. This process includes verification of the treatment port(s) and proper treatment positioning. All treatment ports were photographed within electronic medical record. The patient’s customized lead blocking along with gross tumor volume and margin was confirmed. Considering superficial radiotherapy is clinical in setup, this requires physician and radiation therapist to clarify location interest being treated against initial images, pathology and patient anatomy. Care was taken ensuring fields treated were geometrically accurate and properly positioned using therapeutic radiology simulation-aided field setting verification per fraction. This process is also utilized to determine if any prescription or setup changes are necessary. These steps are therefore medically necessary ensuring safe and effective administration of radiation. Ongoing therapeutic radiology simulation-aided field setting verification is ordered throughout course of therapy.\\n\\nA high frequency ultrasound image was acquired today for two-dimensional evaluation of the tumor volume and response to treatment, in addition to geometric accuracy of field placement. US depth Is 1.54mm, which is .27mm in difference from previous imaging.\\n\\n The field placement and ultrasound imaging, per fraction, is separate and distinct from the initial simulation, and is an important task in providing safe administration of superficial radiation therapy. Physician evaluation of the ultrasound tumor depth will be ongoing throughout the course of treatment and is deemed medically necessary in order to ensure the efficacy of treatment and any necessary changes. Today’s image was evaluated for determination of continuation of treatment with the current plan or with necessary changes as appropriate. According to provider review of verification therapeutic radiology simulation-aided field setting and imaging, no change is required. Additionally, the use of ultrasound visualization and targeted assessment allows the patient to be able to see their cancer(s) progress, encouraging patient to complete and maintain compliance through full course of radiotherapy. Per Dr. Philip, continued ultrasound guidance and therapeutic radiology simulation-aided field setting verification per fraction is required for field placement, measurement of tumor depth, progress and acute effect monitoring. \\n
Detail Level: Zone

## 2021-11-17 NOTE — PROCEDURE: SUPERFICIAL RADIATION TREATMENT
Bill For Simulation And Treatment Device Design: Yes - (Simple Simulation: 77935) Bill For Simulation And Treatment Device Design: Yes - (Simple Simulation: 42523)

## 2021-11-20 ENCOUNTER — APPOINTMENT (OUTPATIENT)
Dept: URBAN - METROPOLITAN AREA CLINIC 321 | Age: 81
Setting detail: DERMATOLOGY
End: 2021-11-20

## 2021-11-20 PROBLEM — C44.311 BASAL CELL CARCINOMA OF SKIN OF NOSE: Status: ACTIVE | Noted: 2021-11-20

## 2021-11-20 PROCEDURE — OTHER FOLLOW UP FOR NEXT VISIT: OTHER

## 2021-11-20 PROCEDURE — OTHER SUPERFICIAL RADIATION TREATMENT: OTHER

## 2021-11-20 PROCEDURE — 77280 THER RAD SIMULAJ FIELD SMPL: CPT

## 2021-11-20 PROCEDURE — OTHER TREATMENT REGIMEN: OTHER

## 2021-11-20 PROCEDURE — 77401 RADIATION TX DELIVERY SUPFC: CPT

## 2021-11-20 PROCEDURE — G6001 ECHO GUIDANCE RADIOTHERAPY: HCPCS

## 2021-11-20 NOTE — PROCEDURE: TREATMENT REGIMEN
Detail Level: Zone
Plan: This patient has been treated today with image guided superficial radiation therapy for non-melanoma skin cancer. Written informed consent has been previously obtained from this patient for this treatment. This consent is documented in the patient’s chart. The patient gave verbal consent to continue treatment today. The patient was treated with a specific radiation dose and setup as prescribed by the provider listed on this visit note. A Radiation Therapist performed administration of radiation under supervision of provider. The treatment parameters and cumulative dose are indicated above. Prior to administering the radiation, the patient underwent a verification therapeutic radiology simulation-aided field setting defining relevant normal and abnormal target anatomy and acquiring images with high frequency ultrasound in addition to data necessary developing optimal radiation treatment process for the patient. This process includes verification of the treatment port(s) and proper treatment positioning. All treatment ports were photographed within electronic medical record. The patient’s customized lead blocking along with gross tumor volume and margin was confirmed. Considering superficial radiotherapy is clinical in setup, this requires physician and radiation therapist to clarify location interest being treated against initial images, pathology and patient anatomy. Care was taken ensuring fields treated were geometrically accurate and properly positioned using therapeutic radiology simulation-aided field setting verification per fraction. This process is also utilized to determine if any prescription or setup changes are necessary. These steps are therefore medically necessary ensuring safe and effective administration of radiation. Ongoing therapeutic radiology simulation-aided field setting verification is ordered throughout course of therapy.\\n\\nA high frequency ultrasound image was acquired today for two-dimensional evaluation of the tumor volume and response to treatment, in addition to geometric accuracy of field placement. US depth Is 2.02mm, which is .52mm in difference from previous imaging.\\n\\n The field placement and ultrasound imaging, per fraction, is separate and distinct from the initial simulation, and is an important task in providing safe administration of superficial radiation therapy. Physician evaluation of the ultrasound tumor depth will be ongoing throughout the course of treatment and is deemed medically necessary in order to ensure the efficacy of treatment and any necessary changes. Today’s image was evaluated for determination of continuation of treatment with the current plan or with necessary changes as appropriate. According to provider review of verification therapeutic radiology simulation-aided field setting and imaging, no change is required. Additionally, the use of ultrasound visualization and targeted assessment allows the patient to be able to see their cancer(s) progress, encouraging patient to complete and maintain compliance through full course of radiotherapy. Per Dr. Philip, continued ultrasound guidance and therapeutic radiology simulation-aided field setting verification per fraction is required for field placement, measurement of tumor depth, progress and acute effect monitoring. \\n

## 2021-11-20 NOTE — PROCEDURE: SUPERFICIAL RADIATION TREATMENT
Bill For Simulation And Treatment Device Design: Yes - (Simple Simulation: 27230) Bill For Simulation And Treatment Device Design: Yes - (Simple Simulation: 36220)

## 2021-11-22 ENCOUNTER — APPOINTMENT (OUTPATIENT)
Dept: URBAN - METROPOLITAN AREA CLINIC 321 | Age: 81
Setting detail: DERMATOLOGY
End: 2021-11-22

## 2021-11-22 PROBLEM — C44.311 BASAL CELL CARCINOMA OF SKIN OF NOSE: Status: ACTIVE | Noted: 2021-11-22

## 2021-11-22 PROCEDURE — OTHER TREATMENT REGIMEN: OTHER

## 2021-11-22 PROCEDURE — G6601: HCPCS

## 2021-11-22 PROCEDURE — OTHER FOLLOW UP FOR NEXT VISIT: OTHER

## 2021-11-22 PROCEDURE — 77401 RADIATION TX DELIVERY SUPFC: CPT

## 2021-11-22 PROCEDURE — OTHER SUPERFICIAL RADIATION TREATMENT: OTHER

## 2021-11-22 NOTE — PROCEDURE: SUPERFICIAL RADIATION TREATMENT
Bill For Simulation And Treatment Device Design: Yes - (Simple Simulation: 20769) Bill For Simulation And Treatment Device Design: Yes - (Simple Simulation: 35587)

## 2021-11-22 NOTE — PROCEDURE: TREATMENT REGIMEN
Plan: This patient has been treated today with image guided superficial radiation therapy for non-melanoma skin cancer. Written informed consent has been previously obtained from this patient for this treatment. This consent is documented in the patient’s chart. The patient gave verbal consent to continue treatment today. The patient was treated with a specific radiation dose and setup as prescribed by the provider listed on this visit note. A Radiation Therapist performed administration of radiation under supervision of provider. The treatment parameters and cumulative dose are indicated above. Prior to administering the radiation, the patient underwent a verification therapeutic radiology simulation-aided field setting defining relevant normal and abnormal target anatomy and acquiring images with high frequency ultrasound in addition to data necessary developing optimal radiation treatment process for the patient. This process includes verification of the treatment port(s) and proper treatment positioning. All treatment ports were photographed within electronic medical record. The patient’s customized lead blocking along with gross tumor volume and margin was confirmed. Considering superficial radiotherapy is clinical in setup, this requires physician and radiation therapist to clarify location interest being treated against initial images, pathology and patient anatomy. Care was taken ensuring fields treated were geometrically accurate and properly positioned using therapeutic radiology simulation-aided field setting verification per fraction. This process is also utilized to determine if any prescription or setup changes are necessary. These steps are therefore medically necessary ensuring safe and effective administration of radiation. Ongoing therapeutic radiology simulation-aided field setting verification is ordered throughout course of therapy.\\n\\nA high frequency ultrasound image was acquired today for two-dimensional evaluation of the tumor volume and response to treatment, in addition to geometric accuracy of field placement. US depth Is 1.69mm, which is .33mm in difference from previous imaging.\\n\\n The field placement and ultrasound imaging, per fraction, is separate and distinct from the initial simulation, and is an important task in providing safe administration of superficial radiation therapy. Physician evaluation of the ultrasound tumor depth will be ongoing throughout the course of treatment and is deemed medically necessary in order to ensure the efficacy of treatment and any necessary changes. Today’s image was evaluated for determination of continuation of treatment with the current plan or with necessary changes as appropriate. According to provider review of verification therapeutic radiology simulation-aided field setting and imaging, no change is required. Additionally, the use of ultrasound visualization and targeted assessment allows the patient to be able to see their cancer(s) progress, encouraging patient to complete and maintain compliance through full course of radiotherapy. Per Dr. Philip, continued ultrasound guidance and therapeutic radiology simulation-aided field setting verification per fraction is required for field placement, measurement of tumor depth, progress and acute effect monitoring. \\n
Detail Level: Zone

## 2021-11-23 ENCOUNTER — APPOINTMENT (OUTPATIENT)
Dept: URBAN - METROPOLITAN AREA CLINIC 321 | Age: 81
Setting detail: DERMATOLOGY
End: 2021-11-24

## 2021-11-23 PROBLEM — C44.311 BASAL CELL CARCINOMA OF SKIN OF NOSE: Status: ACTIVE | Noted: 2021-11-23

## 2021-11-23 PROCEDURE — G6001 ECHO GUIDANCE RADIOTHERAPY: HCPCS

## 2021-11-23 PROCEDURE — OTHER TREATMENT REGIMEN: OTHER

## 2021-11-23 PROCEDURE — OTHER FOLLOW UP FOR NEXT VISIT: OTHER

## 2021-11-23 PROCEDURE — 77427 RADIATION TX MANAGEMENT X5: CPT

## 2021-11-23 PROCEDURE — 77401 RADIATION TX DELIVERY SUPFC: CPT

## 2021-11-23 PROCEDURE — OTHER SUPERFICIAL RADIATION TREATMENT: OTHER

## 2021-11-23 NOTE — PROCEDURE: TREATMENT REGIMEN
Plan: Per the request of Dr. Philip, this patient was seen today for Superficial Radiation Therapy management (CPT® 69073). A high frequency ultrasound image was acquired today for three-dimensional evaluation of tumor volume and response to treatment, in addition to geometric accuracy of field placement (CPT® ). Physician evaluation of the ultrasound tumor depth will be ongoing through the course of treatment and is deemed medically necessary ensuring efficacy of treatment. Today’s image and setup was evaluated determining continuation of treatment with the current plan, or necessary changes as appropriate. All appropriate custom blocking and treatment parameters were verified by the Radiation therapist according to initial simulation. \\n\\nPer Dr. Philip, continued daily US guidance is necessary for measurement of tumor depth, progress and edema monitoring.\\nEvaluation for response and reaction to SRT based on current fraction and cumulative dose with a visual inspection and ultrasound demonstrates a normal expected response. Superficial Radiation Therapy will continue as planned.\\n\\nThe patient is undergoing superficial radiation therapy for skin cancer and presents for weekly evaluation and management. Per protocol and as documented on the flow sheet, the patient was questioned as to subjective redness, pruritus, pain, drainage, fatigue, or any other symptoms. Objectively, the radiation area was evaluated with regards to erythema, atrophy, scale, crusting, erosion, ulceration, edema, purpura, tenderness, warmth, drainage, and any other findings. The plan was extensively reviewed including dose, and dosing schedule. The simulation and clinical setup were also reviewed as was the external and any internal shields and based on this review the appropriateness and sufficiency of treatment was determined.\\nUS image was performed. US depth is 1.54mm.\\nPer Dr. Philip, continued daily US guidance and simulation is required for field placement, measurement of tumor depth, progress and edema monitoring. \\n\\nSubjective:\\nAsymptomatic\\nObjective:\\n\\nAssessment:\\nAppropriate reaction\\nPlan: \\nContinue current treatment regimen\\nComments:\\nRTOG 0\\n Plan: Per the request of Dr. Philip, this patient was seen today for Superficial Radiation Therapy management (CPT® 62371). A high frequency ultrasound image was acquired today for three-dimensional evaluation of tumor volume and response to treatment, in addition to geometric accuracy of field placement (CPT® ). Physician evaluation of the ultrasound tumor depth will be ongoing through the course of treatment and is deemed medically necessary ensuring efficacy of treatment. Today’s image and setup was evaluated determining continuation of treatment with the current plan, or necessary changes as appropriate. All appropriate custom blocking and treatment parameters were verified by the Radiation therapist according to initial simulation. \\n\\nPer Dr. Philip, continued daily US guidance is necessary for measurement of tumor depth, progress and edema monitoring.\\nEvaluation for response and reaction to SRT based on current fraction and cumulative dose with a visual inspection and ultrasound demonstrates a normal expected response. Superficial Radiation Therapy will continue as planned.\\n\\nThe patient is undergoing superficial radiation therapy for skin cancer and presents for weekly evaluation and management. Per protocol and as documented on the flow sheet, the patient was questioned as to subjective redness, pruritus, pain, drainage, fatigue, or any other symptoms. Objectively, the radiation area was evaluated with regards to erythema, atrophy, scale, crusting, erosion, ulceration, edema, purpura, tenderness, warmth, drainage, and any other findings. The plan was extensively reviewed including dose, and dosing schedule. The simulation and clinical setup were also reviewed as was the external and any internal shields and based on this review the appropriateness and sufficiency of treatment was determined.\\nUS image was performed. US depth is 1.54mm.\\nPer Dr. Philip, continued daily US guidance and simulation is required for field placement, measurement of tumor depth, progress and edema monitoring. \\n\\nSubjective:\\nAsymptomatic\\nObjective:\\n\\nAssessment:\\nAppropriate reaction\\nPlan: \\nContinue current treatment regimen\\nComments:\\nRTOG 0\\n

## 2021-11-23 NOTE — PROCEDURE: SUPERFICIAL RADIATION TREATMENT
Bill For Simulation And Treatment Device Design: Yes - (Simple Simulation: 85653) Bill For Simulation And Treatment Device Design: Yes - (Simple Simulation: 04374)

## 2021-11-24 ENCOUNTER — APPOINTMENT (OUTPATIENT)
Dept: URBAN - METROPOLITAN AREA CLINIC 321 | Age: 81
Setting detail: DERMATOLOGY
End: 2021-11-24

## 2021-11-24 PROBLEM — C44.311 BASAL CELL CARCINOMA OF SKIN OF NOSE: Status: ACTIVE | Noted: 2021-11-24

## 2021-11-24 PROCEDURE — OTHER SUPERFICIAL RADIATION TREATMENT: OTHER

## 2021-11-24 PROCEDURE — 77401 RADIATION TX DELIVERY SUPFC: CPT

## 2021-11-24 PROCEDURE — OTHER TREATMENT REGIMEN: OTHER

## 2021-11-24 PROCEDURE — G6001 ECHO GUIDANCE RADIOTHERAPY: HCPCS

## 2021-11-24 PROCEDURE — OTHER FOLLOW UP FOR NEXT VISIT: OTHER

## 2021-11-24 PROCEDURE — 77280 THER RAD SIMULAJ FIELD SMPL: CPT

## 2021-11-24 RX ORDER — DULOXETIN HYDROCHLORIDE 30 MG/1
CAPSULE, DELAYED RELEASE ORAL
Qty: 90 CAPSULE | Refills: 3 | OUTPATIENT
Start: 2021-11-24

## 2021-11-24 RX ORDER — IPRATROPIUM BROMIDE 42 UG/1
SPRAY, METERED NASAL
Qty: 30 ML | Refills: 3 | OUTPATIENT
Start: 2021-11-24

## 2021-11-24 RX ORDER — GLIMEPIRIDE 2 MG/1
TABLET ORAL
Qty: 90 TABLET | Refills: 3 | OUTPATIENT
Start: 2021-11-24

## 2021-11-24 NOTE — PROCEDURE: TREATMENT REGIMEN
Plan: \\nThis patient has been treated today with image guided superficial radiation therapy for non-melanoma skin cancer. Written informed consent has been previously obtained from this patient for this treatment. This consent is documented in the patient’s chart. The patient gave verbal consent to continue treatment today. The patient was treated with a specific radiation dose and setup as prescribed by the provider listed on this visit note. A Radiation Therapist performed administration of radiation under supervision of provider. The treatment parameters and cumulative dose are indicated above. Prior to administering the radiation, the patient underwent a verification therapeutic radiology simulation-aided field setting defining relevant normal and abnormal target anatomy and acquiring images with high frequency ultrasound in addition to data necessary developing optimal radiation treatment process for the patient. This process includes verification of the treatment port(s) and proper treatment positioning. All treatment ports were photographed within electronic medical record. The patient’s customized lead blocking along with gross tumor volume and margin was confirmed. Considering superficial radiotherapy is clinical in setup, this requires physician and radiation therapist to clarify location interest being treated against initial images, pathology and patient anatomy. Care was taken ensuring fields treated were geometrically accurate and properly positioned using therapeutic radiology simulation-aided field setting verification per fraction. This process is also utilized to determine if any prescription or setup changes are necessary. These steps are therefore medically necessary ensuring safe and effective administration of radiation. Ongoing therapeutic radiology simulation-aided field setting verification is ordered throughout course of therapy.\\nA high frequency ultrasound image was acquired today for two-dimensional evaluation of the tumor volume and response to treatment, in addition to geometric accuracy of field placement. US depth Is 1.72mm, which is .18mm in difference from previous imaging. The field placement and ultrasound imaging, per fraction, is separate and distinct from the initial simulation, and is an important task in providing safe administration of superficial radiation therapy. Physician evaluation of the ultrasound tumor depth will be ongoing throughout the course of treatment and is deemed medically necessary in order to ensure the efficacy of treatment and any necessary changes. Today’s image was evaluated for determination of continuation of treatment with the current plan or with necessary changes as appropriate. According to provider review of verification therapeutic radiology simulation-aided field setting and imaging, no change is required. Additionally, the use of ultrasound visualization and targeted assessment allows the patient to be able to see their cancer(s) progress, encouraging patient to complete and maintain compliance through full course of radiotherapy. Per Dr. Philip, continued ultrasound guidance and therapeutic radiology simulation-aided field setting verification per fraction is required for field placement, measurement of tumor depth, progress and acute effect monitoring. \\n
Detail Level: Zone

## 2021-11-24 NOTE — PROCEDURE: SUPERFICIAL RADIATION TREATMENT
Bill For Simulation And Treatment Device Design: Yes - (Simple Simulation: 82357) Bill For Simulation And Treatment Device Design: Yes - (Simple Simulation: 21041)

## 2021-11-24 NOTE — TELEPHONE ENCOUNTER
Current refill request refused due to refill is either a duplicate request or has active refills at the pharmacy. Check previous templates.     Requested Prescriptions     Refused Prescriptions Disp Refills   • DULOXETINE 30 MG Oral Cap DR [employer] [Pharm

## 2021-11-28 ENCOUNTER — APPOINTMENT (OUTPATIENT)
Dept: URBAN - METROPOLITAN AREA CLINIC 321 | Age: 81
Setting detail: DERMATOLOGY
End: 2021-12-15

## 2021-11-28 PROCEDURE — 77336 RADIATION PHYSICS CONSULT: CPT

## 2021-11-29 ENCOUNTER — APPOINTMENT (OUTPATIENT)
Dept: URBAN - METROPOLITAN AREA CLINIC 321 | Age: 81
Setting detail: DERMATOLOGY
End: 2021-11-29

## 2021-11-29 PROBLEM — C44.311 BASAL CELL CARCINOMA OF SKIN OF NOSE: Status: ACTIVE | Noted: 2021-11-29

## 2021-11-29 PROCEDURE — OTHER TREATMENT REGIMEN: OTHER

## 2021-11-29 PROCEDURE — 77280 THER RAD SIMULAJ FIELD SMPL: CPT

## 2021-11-29 PROCEDURE — 77401 RADIATION TX DELIVERY SUPFC: CPT

## 2021-11-29 PROCEDURE — OTHER SUPERFICIAL RADIATION TREATMENT: OTHER

## 2021-11-29 PROCEDURE — G6001 ECHO GUIDANCE RADIOTHERAPY: HCPCS

## 2021-11-29 PROCEDURE — OTHER FOLLOW UP FOR NEXT VISIT: OTHER

## 2021-11-29 NOTE — PROCEDURE: TREATMENT REGIMEN
Plan: This patient has been treated today with image guided superficial radiation therapy for non-melanoma skin cancer. Written informed consent has been previously obtained from this patient for this treatment. This consent is documented in the patient’s chart. The patient gave verbal consent to continue treatment today. The patient was treated with a specific radiation dose and setup as prescribed by the provider listed on this visit note. A Radiation Therapist performed administration of radiation under supervision of provider. The treatment parameters and cumulative dose are indicated above. Prior to administering the radiation, the patient underwent a verification therapeutic radiology simulation-aided field setting defining relevant normal and abnormal target anatomy and acquiring images with high frequency ultrasound in addition to data necessary developing optimal radiation treatment process for the patient. This process includes verification of the treatment port(s) and proper treatment positioning. All treatment ports were photographed within electronic medical record. The patient’s customized lead blocking along with gross tumor volume and margin was confirmed. Considering superficial radiotherapy is clinical in setup, this requires physician and radiation therapist to clarify location interest being treated against initial images, pathology and patient anatomy. Care was taken ensuring fields treated were geometrically accurate and properly positioned using therapeutic radiology simulation-aided field setting verification per fraction. This process is also utilized to determine if any prescription or setup changes are necessary. These steps are therefore medically necessary ensuring safe and effective administration of radiation. Ongoing therapeutic radiology simulation-aided field setting verification is ordered throughout course of therapy.\\nA high frequency ultrasound image was acquired today for two-dimensional evaluation of the tumor volume and response to treatment, in addition to geometric accuracy of field placement. US depth Is 1.48mm, which is ..24mm in difference from previous imaging. The field placement and ultrasound imaging, per fraction, is separate and distinct from the initial simulation, and is an important task in providing safe administration of superficial radiation therapy. Physician evaluation of the ultrasound tumor depth will be ongoing throughout the course of treatment and is deemed medically necessary in order to ensure the efficacy of treatment and any necessary changes. Today’s image was evaluated for determination of continuation of treatment with the current plan or with necessary changes as appropriate. According to provider review of verification therapeutic radiology simulation-aided field setting and imaging, no change is required. Additionally, the use of ultrasound visualization and targeted assessment allows the patient to be able to see their cancer(s) progress, encouraging patient to complete and maintain compliance through full course of radiotherapy. Per Dr. Philip, continued ultrasound guidance and therapeutic radiology simulation-aided field setting verification per fraction is required for field placement, measurement of tumor depth, progress and acute effect monitoring. \\n
Detail Level: Zone

## 2021-11-29 NOTE — PROCEDURE: SUPERFICIAL RADIATION TREATMENT
Bill For Simulation And Treatment Device Design: Yes - (Simple Simulation: 33130) Bill For Simulation And Treatment Device Design: Yes - (Simple Simulation: 17263)

## 2021-11-30 ENCOUNTER — APPOINTMENT (OUTPATIENT)
Dept: URBAN - METROPOLITAN AREA CLINIC 321 | Age: 81
Setting detail: DERMATOLOGY
End: 2021-12-01

## 2021-11-30 PROBLEM — C44.311 BASAL CELL CARCINOMA OF SKIN OF NOSE: Status: ACTIVE | Noted: 2021-11-30

## 2021-11-30 PROCEDURE — OTHER SUPERFICIAL RADIATION TREATMENT: OTHER

## 2021-11-30 PROCEDURE — 77280 THER RAD SIMULAJ FIELD SMPL: CPT

## 2021-11-30 PROCEDURE — G6001 ECHO GUIDANCE RADIOTHERAPY: HCPCS

## 2021-11-30 PROCEDURE — OTHER FOLLOW UP FOR NEXT VISIT: OTHER

## 2021-11-30 PROCEDURE — OTHER TREATMENT REGIMEN: OTHER

## 2021-11-30 PROCEDURE — 77401 RADIATION TX DELIVERY SUPFC: CPT

## 2021-11-30 NOTE — PROCEDURE: TREATMENT REGIMEN
Detail Level: Zone
Plan: This patient has been treated today with image guided superficial radiation therapy for non-melanoma skin cancer. Written informed consent has been previously obtained from this patient for this treatment. This consent is documented in the patient’s chart. The patient gave verbal consent to continue treatment today. The patient was treated with a specific radiation dose and setup as prescribed by the provider listed on this visit note. A Radiation Therapist performed administration of radiation under supervision of provider. The treatment parameters and cumulative dose are indicated above. Prior to administering the radiation, the patient underwent a verification therapeutic radiology simulation-aided field setting defining relevant normal and abnormal target anatomy and acquiring images with high frequency ultrasound in addition to data necessary developing optimal radiation treatment process for the patient. This process includes verification of the treatment port(s) and proper treatment positioning. All treatment ports were photographed within electronic medical record. The patient’s customized lead blocking along with gross tumor volume and margin was confirmed. Considering superficial radiotherapy is clinical in setup, this requires physician and radiation therapist to clarify location interest being treated against initial images, pathology and patient anatomy. Care was taken ensuring fields treated were geometrically accurate and properly positioned using therapeutic radiology simulation-aided field setting verification per fraction. This process is also utilized to determine if any prescription or setup changes are necessary. These steps are therefore medically necessary ensuring safe and effective administration of radiation. Ongoing therapeutic radiology simulation-aided field setting verification is ordered throughout course of therapy.\\nA high frequency ultrasound image was acquired today for two-dimensional evaluation of the tumor volume and response to treatment, in addition to geometric accuracy of field placement. US depth Is 1.09mm, which is .39mm in difference from previous imaging. The field placement and ultrasound imaging, per fraction, is separate and distinct from the initial simulation, and is an important task in providing safe administration of superficial radiation therapy. Physician evaluation of the ultrasound tumor depth will be ongoing throughout the course of treatment and is deemed medically necessary in order to ensure the efficacy of treatment and any necessary changes. Today’s image was evaluated for determination of continuation of treatment with the current plan or with necessary changes as appropriate. According to provider review of verification therapeutic radiology simulation-aided field setting and imaging, no change is required. Additionally, the use of ultrasound visualization and targeted assessment allows the patient to be able to see their cancer(s) progress, encouraging patient to complete and maintain compliance through full course of radiotherapy. Per Dr. Philip, continued ultrasound guidance and therapeutic radiology simulation-aided field setting verification per fraction is required for field placement, measurement of tumor depth, progress and acute effect monitoring. \\n

## 2021-11-30 NOTE — PROCEDURE: SUPERFICIAL RADIATION TREATMENT
Bill For Simulation And Treatment Device Design: Yes - (Simple Simulation: 20212) Bill For Simulation And Treatment Device Design: Yes - (Simple Simulation: 21067)

## 2021-12-01 ENCOUNTER — APPOINTMENT (OUTPATIENT)
Dept: URBAN - METROPOLITAN AREA CLINIC 321 | Age: 81
Setting detail: DERMATOLOGY
End: 2021-12-01

## 2021-12-01 PROBLEM — C44.311 BASAL CELL CARCINOMA OF SKIN OF NOSE: Status: ACTIVE | Noted: 2021-12-01

## 2021-12-01 PROCEDURE — OTHER TREATMENT REGIMEN: OTHER

## 2021-12-01 PROCEDURE — 77280 THER RAD SIMULAJ FIELD SMPL: CPT

## 2021-12-01 PROCEDURE — OTHER FOLLOW UP FOR NEXT VISIT: OTHER

## 2021-12-01 PROCEDURE — OTHER SUPERFICIAL RADIATION TREATMENT: OTHER

## 2021-12-01 PROCEDURE — 77401 RADIATION TX DELIVERY SUPFC: CPT

## 2021-12-01 PROCEDURE — G6001 ECHO GUIDANCE RADIOTHERAPY: HCPCS

## 2021-12-01 NOTE — PROCEDURE: TREATMENT REGIMEN
Detail Level: Zone
Plan: This patient has been treated today with image guided superficial radiation therapy for non-melanoma skin cancer. Written informed consent has been previously obtained from this patient for this treatment. This consent is documented in the patient’s chart. The patient gave verbal consent to continue treatment today. The patient was treated with a specific radiation dose and setup as prescribed by the provider listed on this visit note. A Radiation Therapist performed administration of radiation under supervision of provider. The treatment parameters and cumulative dose are indicated above. Prior to administering the radiation, the patient underwent a verification therapeutic radiology simulation-aided field setting defining relevant normal and abnormal target anatomy and acquiring images with high frequency ultrasound in addition to data necessary developing optimal radiation treatment process for the patient. This process includes verification of the treatment port(s) and proper treatment positioning. All treatment ports were photographed within electronic medical record. The patient’s customized lead blocking along with gross tumor volume and margin was confirmed. Considering superficial radiotherapy is clinical in setup, this requires physician and radiation therapist to clarify location interest being treated against initial images, pathology and patient anatomy. Care was taken ensuring fields treated were geometrically accurate and properly positioned using therapeutic radiology simulation-aided field setting verification per fraction. This process is also utilized to determine if any prescription or setup changes are necessary. These steps are therefore medically necessary ensuring safe and effective administration of radiation. Ongoing therapeutic radiology simulation-aided field setting verification is ordered throughout course of therapy.\\nA high frequency ultrasound image was acquired today for two-dimensional evaluation of the tumor volume and response to treatment, in addition to geometric accuracy of field placement. US depth Is 1.24mm, which is .15mm in difference from previous imaging. The field placement and ultrasound imaging, per fraction, is separate and distinct from the initial simulation, and is an important task in providing safe administration of superficial radiation therapy. Physician evaluation of the ultrasound tumor depth will be ongoing throughout the course of treatment and is deemed medically necessary in order to ensure the efficacy of treatment and any necessary changes. Today’s image was evaluated for determination of continuation of treatment with the current plan or with necessary changes as appropriate. According to provider review of verification therapeutic radiology simulation-aided field setting and imaging, no change is required. Additionally, the use of ultrasound visualization and targeted assessment allows the patient to be able to see their cancer(s) progress, encouraging patient to complete and maintain compliance through full course of radiotherapy. Per Dr. Philip, continued ultrasound guidance and therapeutic radiology simulation-aided field setting verification per fraction is required for field placement, measurement of tumor depth, progress and acute effect monitoring. \\n

## 2021-12-01 NOTE — PROCEDURE: SUPERFICIAL RADIATION TREATMENT
Bill For Simulation And Treatment Device Design: Yes - (Simple Simulation: 08955) Bill For Simulation And Treatment Device Design: Yes - (Simple Simulation: 08089)

## 2021-12-03 ENCOUNTER — APPOINTMENT (OUTPATIENT)
Dept: URBAN - METROPOLITAN AREA CLINIC 321 | Age: 81
Setting detail: DERMATOLOGY
End: 2021-12-03

## 2021-12-03 ENCOUNTER — APPOINTMENT (OUTPATIENT)
Dept: URBAN - METROPOLITAN AREA CLINIC 321 | Age: 81
Setting detail: DERMATOLOGY
End: 2021-12-05

## 2021-12-03 PROBLEM — C44.311 BASAL CELL CARCINOMA OF SKIN OF NOSE: Status: ACTIVE | Noted: 2021-12-03

## 2021-12-03 PROCEDURE — OTHER SUPERFICIAL RADIATION TREATMENT: OTHER

## 2021-12-03 PROCEDURE — 77401 RADIATION TX DELIVERY SUPFC: CPT

## 2021-12-03 PROCEDURE — OTHER TREATMENT REGIMEN: OTHER

## 2021-12-03 PROCEDURE — 77280 THER RAD SIMULAJ FIELD SMPL: CPT

## 2021-12-03 PROCEDURE — OTHER FOLLOW UP FOR NEXT VISIT: OTHER

## 2021-12-03 PROCEDURE — G6001 ECHO GUIDANCE RADIOTHERAPY: HCPCS

## 2021-12-03 PROCEDURE — 77427 RADIATION TX MANAGEMENT X5: CPT

## 2021-12-03 NOTE — PROCEDURE: TREATMENT REGIMEN
Plan: This patient has been treated today with image guided superficial radiation therapy for non-melanoma skin cancer. Written informed consent has been previously obtained from this patient for this treatment. This consent is documented in the patient’s chart. The patient gave verbal consent to continue treatment today. The patient was treated with a specific radiation dose and setup as prescribed by the provider listed on this visit note. A Radiation Therapist performed administration of radiation under supervision of provider. The treatment parameters and cumulative dose are indicated above. Prior to administering the radiation, the patient underwent a verification therapeutic radiology simulation-aided field setting defining relevant normal and abnormal target anatomy and acquiring images with high frequency ultrasound in addition to data necessary developing optimal radiation treatment process for the patient. This process includes verification of the treatment port(s) and proper treatment positioning. All treatment ports were photographed within electronic medical record. The patient’s customized lead blocking along with gross tumor volume and margin was confirmed. Considering superficial radiotherapy is clinical in setup, this requires physician and radiation therapist to clarify location interest being treated against initial images, pathology and patient anatomy. Care was taken ensuring fields treated were geometrically accurate and properly positioned using therapeutic radiology simulation-aided field setting verification per fraction. This process is also utilized to determine if any prescription or setup changes are necessary. These steps are therefore medically necessary ensuring safe and effective administration of radiation. Ongoing therapeutic radiology simulation-aided field setting verification is ordered throughout course of therapy.\\nA high frequency ultrasound image was acquired today for two-dimensional evaluation of the tumor volume and response to treatment, in addition to geometric accuracy of field placement. US depth Is 1.34mm, which is .10mm in difference from previous imaging. The field placement and ultrasound imaging, per fraction, is separate and distinct from the initial simulation, and is an important task in providing safe administration of superficial radiation therapy. Physician evaluation of the ultrasound tumor depth will be ongoing throughout the course of treatment and is deemed medically necessary in order to ensure the efficacy of treatment and any necessary changes. Today’s image was evaluated for determination of continuation of treatment with the current plan or with necessary changes as appropriate. According to provider review of verification therapeutic radiology simulation-aided field setting and imaging, no change is required. Additionally, the use of ultrasound visualization and targeted assessment allows the patient to be able to see their cancer(s) progress, encouraging patient to complete and maintain compliance through full course of radiotherapy. Per Dr. Philip, continued ultrasound guidance and therapeutic radiology simulation-aided field setting verification per fraction is required for field placement, measurement of tumor depth, progress and acute effect monitoring. \\nPer the request of Dr. Philip, this patient was seen today for Superficial Radiation Therapy management (CPT® 85869). A high frequency ultrasound image was acquired today for three-dimensional evaluation of tumor volume and response to treatment, in addition to geometric accuracy of field placement (CPT® ). Physician evaluation of the ultrasound tumor depth will be ongoing through the course of treatment and is deemed medically necessary ensuring efficacy of treatment. Today’s image and setup was evaluated determining continuation of treatment with the current plan, or necessary changes as appropriate. All appropriate custom blocking and treatment parameters were verified by the Radiation therapist according to initial simulation. \\n\\nPer Dr. Philip, continued daily US guidance is necessary for measurement of tumor depth, progress and edema monitoring.\\nEvaluation for response and reaction to SRT based on current fraction and cumulative dose with a visual inspection and ultrasound demonstrates a normal expected response. Superficial Radiation Therapy will continue as planned.\\n\\nThe patient is undergoing superficial radiation therapy for skin cancer and presents for weekly evaluation and management. Per protocol and as documented on the flow sheet, the patient was questioned as to subjective redness, pruritus, pain, drainage, fatigue, or any other symptoms. Objectively, the radiation area was evaluated with regards to erythema, atrophy, scale, crusting, erosion, ulceration, edema, purpura, tenderness, warmth, drainage, and any other findings. The plan was extensively reviewed including dose, and dosing schedule. The simulation and clinical setup were also reviewed as was the external and any internal shields and based on this review the appropriateness and sufficiency of treatment was determined.\\nUS image was performed. US depth is 1.34mm.\\nPer Dr. Philip, continued daily US guidance and simulation is required for field placement, measurement of tumor depth, progress and edema monitoring. \\n\\nSubjective:\\nRedness\\nObjective:\\nErythema\\nAssessment:\\nAppropriate reaction\\nPlan: \\nContinue current treatment regimen\\nComments:\\nRTOG 3\\n\\n\\n\\n\\n Plan: This patient has been treated today with image guided superficial radiation therapy for non-melanoma skin cancer. Written informed consent has been previously obtained from this patient for this treatment. This consent is documented in the patient’s chart. The patient gave verbal consent to continue treatment today. The patient was treated with a specific radiation dose and setup as prescribed by the provider listed on this visit note. A Radiation Therapist performed administration of radiation under supervision of provider. The treatment parameters and cumulative dose are indicated above. Prior to administering the radiation, the patient underwent a verification therapeutic radiology simulation-aided field setting defining relevant normal and abnormal target anatomy and acquiring images with high frequency ultrasound in addition to data necessary developing optimal radiation treatment process for the patient. This process includes verification of the treatment port(s) and proper treatment positioning. All treatment ports were photographed within electronic medical record. The patient’s customized lead blocking along with gross tumor volume and margin was confirmed. Considering superficial radiotherapy is clinical in setup, this requires physician and radiation therapist to clarify location interest being treated against initial images, pathology and patient anatomy. Care was taken ensuring fields treated were geometrically accurate and properly positioned using therapeutic radiology simulation-aided field setting verification per fraction. This process is also utilized to determine if any prescription or setup changes are necessary. These steps are therefore medically necessary ensuring safe and effective administration of radiation. Ongoing therapeutic radiology simulation-aided field setting verification is ordered throughout course of therapy.\\nA high frequency ultrasound image was acquired today for two-dimensional evaluation of the tumor volume and response to treatment, in addition to geometric accuracy of field placement. US depth Is 1.34mm, which is .10mm in difference from previous imaging. The field placement and ultrasound imaging, per fraction, is separate and distinct from the initial simulation, and is an important task in providing safe administration of superficial radiation therapy. Physician evaluation of the ultrasound tumor depth will be ongoing throughout the course of treatment and is deemed medically necessary in order to ensure the efficacy of treatment and any necessary changes. Today’s image was evaluated for determination of continuation of treatment with the current plan or with necessary changes as appropriate. According to provider review of verification therapeutic radiology simulation-aided field setting and imaging, no change is required. Additionally, the use of ultrasound visualization and targeted assessment allows the patient to be able to see their cancer(s) progress, encouraging patient to complete and maintain compliance through full course of radiotherapy. Per Dr. Philip, continued ultrasound guidance and therapeutic radiology simulation-aided field setting verification per fraction is required for field placement, measurement of tumor depth, progress and acute effect monitoring. \\nPer the request of Dr. Philip, this patient was seen today for Superficial Radiation Therapy management (CPT® 12080). A high frequency ultrasound image was acquired today for three-dimensional evaluation of tumor volume and response to treatment, in addition to geometric accuracy of field placement (CPT® ). Physician evaluation of the ultrasound tumor depth will be ongoing through the course of treatment and is deemed medically necessary ensuring efficacy of treatment. Today’s image and setup was evaluated determining continuation of treatment with the current plan, or necessary changes as appropriate. All appropriate custom blocking and treatment parameters were verified by the Radiation therapist according to initial simulation. \\n\\nPer Dr. Philip, continued daily US guidance is necessary for measurement of tumor depth, progress and edema monitoring.\\nEvaluation for response and reaction to SRT based on current fraction and cumulative dose with a visual inspection and ultrasound demonstrates a normal expected response. Superficial Radiation Therapy will continue as planned.\\n\\nThe patient is undergoing superficial radiation therapy for skin cancer and presents for weekly evaluation and management. Per protocol and as documented on the flow sheet, the patient was questioned as to subjective redness, pruritus, pain, drainage, fatigue, or any other symptoms. Objectively, the radiation area was evaluated with regards to erythema, atrophy, scale, crusting, erosion, ulceration, edema, purpura, tenderness, warmth, drainage, and any other findings. The plan was extensively reviewed including dose, and dosing schedule. The simulation and clinical setup were also reviewed as was the external and any internal shields and based on this review the appropriateness and sufficiency of treatment was determined.\\nUS image was performed. US depth is 1.34mm.\\nPer Dr. Philip, continued daily US guidance and simulation is required for field placement, measurement of tumor depth, progress and edema monitoring. \\n\\nSubjective:\\nRedness\\nObjective:\\nErythema\\nAssessment:\\nAppropriate reaction\\nPlan: \\nContinue current treatment regimen\\nComments:\\nRTOG 3\\n\\n\\n\\n\\n

## 2021-12-03 NOTE — PROCEDURE: SUPERFICIAL RADIATION TREATMENT
Bill For Simulation And Treatment Device Design: Yes - (Simple Simulation: 16486) Bill For Simulation And Treatment Device Design: Yes - (Simple Simulation: 23000)

## 2021-12-06 ENCOUNTER — APPOINTMENT (OUTPATIENT)
Dept: URBAN - METROPOLITAN AREA CLINIC 321 | Age: 81
Setting detail: DERMATOLOGY
End: 2021-12-06

## 2021-12-06 PROBLEM — C44.311 BASAL CELL CARCINOMA OF SKIN OF NOSE: Status: ACTIVE | Noted: 2021-12-06

## 2021-12-06 PROCEDURE — OTHER SUPERFICIAL RADIATION TREATMENT: OTHER

## 2021-12-06 PROCEDURE — G6001 ECHO GUIDANCE RADIOTHERAPY: HCPCS

## 2021-12-06 PROCEDURE — 77280 THER RAD SIMULAJ FIELD SMPL: CPT

## 2021-12-06 PROCEDURE — OTHER FOLLOW UP FOR NEXT VISIT: OTHER

## 2021-12-06 PROCEDURE — OTHER TREATMENT REGIMEN: OTHER

## 2021-12-06 PROCEDURE — 77401 RADIATION TX DELIVERY SUPFC: CPT

## 2021-12-06 NOTE — PROCEDURE: TREATMENT REGIMEN
Plan: This patient has been treated today with image guided superficial radiation therapy for non-melanoma skin cancer. Written informed consent has been previously obtained from this patient for this treatment. This consent is documented in the patient’s chart. The patient gave verbal consent to continue treatment today. The patient was treated with a specific radiation dose and setup as prescribed by the provider listed on this visit note. A Radiation Therapist performed administration of radiation under supervision of provider. The treatment parameters and cumulative dose are indicated above. Prior to administering the radiation, the patient underwent a verification therapeutic radiology simulation-aided field setting defining relevant normal and abnormal target anatomy and acquiring images with high frequency ultrasound in addition to data necessary developing optimal radiation treatment process for the patient. This process includes verification of the treatment port(s) and proper treatment positioning. All treatment ports were photographed within electronic medical record. The patient’s customized lead blocking along with gross tumor volume and margin was confirmed. Considering superficial radiotherapy is clinical in setup, this requires physician and radiation therapist to clarify location interest being treated against initial images, pathology and patient anatomy. Care was taken ensuring fields treated were geometrically accurate and properly positioned using therapeutic radiology simulation-aided field setting verification per fraction. This process is also utilized to determine if any prescription or setup changes are necessary. These steps are therefore medically necessary ensuring safe and effective administration of radiation. Ongoing therapeutic radiology simulation-aided field setting verification is ordered throughout course of therapy.\\nA high frequency ultrasound image was acquired today for two-dimensional evaluation of the tumor volume and response to treatment, in addition to geometric accuracy of field placement. US depth Is 1.53mm, which is .19mm in difference from previous imaging. The field placement and ultrasound imaging, per fraction, is separate and distinct from the initial simulation, and is an important task in providing safe administration of superficial radiation therapy. Physician evaluation of the ultrasound tumor depth will be ongoing throughout the course of treatment and is deemed medically necessary in order to ensure the efficacy of treatment and any necessary changes. Today’s image was evaluated for determination of continuation of treatment with the current plan or with necessary changes as appropriate. According to provider review of verification therapeutic radiology simulation-aided field setting and imaging, no change is required. Additionally, the use of ultrasound visualization and targeted assessment allows the patient to be able to see their cancer(s) progress, encouraging patient to complete and maintain compliance through full course of radiotherapy. Per Dr. Philip, continued ultrasound guidance and therapeutic radiology simulation-aided field setting verification per fraction is required for field placement, measurement of tumor depth, progress and acute effect monitoring. \\nPer the request of Dr. Philip, this patient was seen today for Superficial Radiation Therapy management (CPT® 27994). A high frequency ultrasound image was acquired today for three-dimensional evaluation of tumor volume and response to treatment, in addition to geometric accuracy of field placement (CPT® ). Physician evaluation of the ultrasound tumor depth will be ongoing through the course of treatment and is deemed medically necessary ensuring efficacy of treatment. Today’s image and setup was evaluated determining continuation of treatment with the current plan, or necessary changes as appropriate. All appropriate custom blocking and treatment parameters were verified by the Radiation therapist according to initial simulation. \\n\\n\\n\\n\\n\\n\\n Plan: This patient has been treated today with image guided superficial radiation therapy for non-melanoma skin cancer. Written informed consent has been previously obtained from this patient for this treatment. This consent is documented in the patient’s chart. The patient gave verbal consent to continue treatment today. The patient was treated with a specific radiation dose and setup as prescribed by the provider listed on this visit note. A Radiation Therapist performed administration of radiation under supervision of provider. The treatment parameters and cumulative dose are indicated above. Prior to administering the radiation, the patient underwent a verification therapeutic radiology simulation-aided field setting defining relevant normal and abnormal target anatomy and acquiring images with high frequency ultrasound in addition to data necessary developing optimal radiation treatment process for the patient. This process includes verification of the treatment port(s) and proper treatment positioning. All treatment ports were photographed within electronic medical record. The patient’s customized lead blocking along with gross tumor volume and margin was confirmed. Considering superficial radiotherapy is clinical in setup, this requires physician and radiation therapist to clarify location interest being treated against initial images, pathology and patient anatomy. Care was taken ensuring fields treated were geometrically accurate and properly positioned using therapeutic radiology simulation-aided field setting verification per fraction. This process is also utilized to determine if any prescription or setup changes are necessary. These steps are therefore medically necessary ensuring safe and effective administration of radiation. Ongoing therapeutic radiology simulation-aided field setting verification is ordered throughout course of therapy.\\nA high frequency ultrasound image was acquired today for two-dimensional evaluation of the tumor volume and response to treatment, in addition to geometric accuracy of field placement. US depth Is 1.53mm, which is .19mm in difference from previous imaging. The field placement and ultrasound imaging, per fraction, is separate and distinct from the initial simulation, and is an important task in providing safe administration of superficial radiation therapy. Physician evaluation of the ultrasound tumor depth will be ongoing throughout the course of treatment and is deemed medically necessary in order to ensure the efficacy of treatment and any necessary changes. Today’s image was evaluated for determination of continuation of treatment with the current plan or with necessary changes as appropriate. According to provider review of verification therapeutic radiology simulation-aided field setting and imaging, no change is required. Additionally, the use of ultrasound visualization and targeted assessment allows the patient to be able to see their cancer(s) progress, encouraging patient to complete and maintain compliance through full course of radiotherapy. Per Dr. Philip, continued ultrasound guidance and therapeutic radiology simulation-aided field setting verification per fraction is required for field placement, measurement of tumor depth, progress and acute effect monitoring. \\nPer the request of Dr. Philip, this patient was seen today for Superficial Radiation Therapy management (CPT® 08518). A high frequency ultrasound image was acquired today for three-dimensional evaluation of tumor volume and response to treatment, in addition to geometric accuracy of field placement (CPT® ). Physician evaluation of the ultrasound tumor depth will be ongoing through the course of treatment and is deemed medically necessary ensuring efficacy of treatment. Today’s image and setup was evaluated determining continuation of treatment with the current plan, or necessary changes as appropriate. All appropriate custom blocking and treatment parameters were verified by the Radiation therapist according to initial simulation. \\n\\n\\n\\n\\n\\n\\n

## 2021-12-06 NOTE — PROCEDURE: SUPERFICIAL RADIATION TREATMENT
Bill For Simulation And Treatment Device Design: Yes - (Simple Simulation: 87412) Bill For Simulation And Treatment Device Design: Yes - (Simple Simulation: 27854)

## 2021-12-08 ENCOUNTER — APPOINTMENT (OUTPATIENT)
Dept: URBAN - METROPOLITAN AREA CLINIC 321 | Age: 81
Setting detail: DERMATOLOGY
End: 2021-12-08

## 2021-12-08 PROBLEM — C44.311 BASAL CELL CARCINOMA OF SKIN OF NOSE: Status: ACTIVE | Noted: 2021-12-08

## 2021-12-08 PROCEDURE — OTHER TREATMENT REGIMEN: OTHER

## 2021-12-08 PROCEDURE — G6001 ECHO GUIDANCE RADIOTHERAPY: HCPCS

## 2021-12-08 PROCEDURE — OTHER SUPERFICIAL RADIATION TREATMENT: OTHER

## 2021-12-08 PROCEDURE — 77401 RADIATION TX DELIVERY SUPFC: CPT

## 2021-12-08 PROCEDURE — OTHER FOLLOW UP FOR NEXT VISIT: OTHER

## 2021-12-08 PROCEDURE — 77280 THER RAD SIMULAJ FIELD SMPL: CPT

## 2021-12-08 NOTE — PROCEDURE: TREATMENT REGIMEN
Plan: This patient has been treated today with image guided superficial radiation therapy for non-melanoma skin cancer. Written informed consent has been previously obtained from this patient for this treatment. This consent is documented in the patient’s chart. The patient gave verbal consent to continue treatment today. The patient was treated with a specific radiation dose and setup as prescribed by the provider listed on this visit note. A Radiation Therapist performed administration of radiation under supervision of provider. The treatment parameters and cumulative dose are indicated above. Prior to administering the radiation, the patient underwent a verification therapeutic radiology simulation-aided field setting defining relevant normal and abnormal target anatomy and acquiring images with high frequency ultrasound in addition to data necessary developing optimal radiation treatment process for the patient. This process includes verification of the treatment port(s) and proper treatment positioning. All treatment ports were photographed within electronic medical record. The patient’s customized lead blocking along with gross tumor volume and margin was confirmed. Considering superficial radiotherapy is clinical in setup, this requires physician and radiation therapist to clarify location interest being treated against initial images, pathology and patient anatomy. Care was taken ensuring fields treated were geometrically accurate and properly positioned using therapeutic radiology simulation-aided field setting verification per fraction. This process is also utilized to determine if any prescription or setup changes are necessary. These steps are therefore medically necessary ensuring safe and effective administration of radiation. Ongoing therapeutic radiology simulation-aided field setting verification is ordered throughout course of therapy.\\nA high frequency ultrasound image was acquired today for two-dimensional evaluation of the tumor volume and response to treatment, in addition to geometric accuracy of field placement. US depth Is 1.38mm, which is .15mm in difference from previous imaging. The field placement and ultrasound imaging, per fraction, is separate and distinct from the initial simulation, and is an important task in providing safe administration of superficial radiation therapy. Physician evaluation of the ultrasound tumor depth will be ongoing throughout the course of treatment and is deemed medically necessary in order to ensure the efficacy of treatment and any necessary changes. Today’s image was evaluated for determination of continuation of treatment with the current plan or with necessary changes as appropriate. According to provider review of verification therapeutic radiology simulation-aided field setting and imaging, no change is required. Additionally, the use of ultrasound visualization and targeted assessment allows the patient to be able to see their cancer(s) progress, encouraging patient to complete and maintain compliance through full course of radiotherapy. Per Dr. Philip, continued ultrasound guidance and therapeutic radiology simulation-aided field setting verification per fraction is required for field placement, measurement of tumor depth, progress and acute effect monitoring. \\nPer the request of Dr. Philip, this patient was seen today for Superficial Radiation Therapy management (CPT® 88472). A high frequency ultrasound image was acquired today for three-dimensional evaluation of tumor volume and response to treatment, in addition to geometric accuracy of field placement (CPT® ). Physician evaluation of the ultrasound tumor depth will be ongoing through the course of treatment and is deemed medically necessary ensuring efficacy of treatment. Today’s image and setup was evaluated determining continuation of treatment with the current plan, or necessary changes as appropriate. All appropriate custom blocking and treatment parameters were verified by the Radiation therapist according to initial simulation. \\n\\n\\n\\n\\n\\n\\n Plan: This patient has been treated today with image guided superficial radiation therapy for non-melanoma skin cancer. Written informed consent has been previously obtained from this patient for this treatment. This consent is documented in the patient’s chart. The patient gave verbal consent to continue treatment today. The patient was treated with a specific radiation dose and setup as prescribed by the provider listed on this visit note. A Radiation Therapist performed administration of radiation under supervision of provider. The treatment parameters and cumulative dose are indicated above. Prior to administering the radiation, the patient underwent a verification therapeutic radiology simulation-aided field setting defining relevant normal and abnormal target anatomy and acquiring images with high frequency ultrasound in addition to data necessary developing optimal radiation treatment process for the patient. This process includes verification of the treatment port(s) and proper treatment positioning. All treatment ports were photographed within electronic medical record. The patient’s customized lead blocking along with gross tumor volume and margin was confirmed. Considering superficial radiotherapy is clinical in setup, this requires physician and radiation therapist to clarify location interest being treated against initial images, pathology and patient anatomy. Care was taken ensuring fields treated were geometrically accurate and properly positioned using therapeutic radiology simulation-aided field setting verification per fraction. This process is also utilized to determine if any prescription or setup changes are necessary. These steps are therefore medically necessary ensuring safe and effective administration of radiation. Ongoing therapeutic radiology simulation-aided field setting verification is ordered throughout course of therapy.\\nA high frequency ultrasound image was acquired today for two-dimensional evaluation of the tumor volume and response to treatment, in addition to geometric accuracy of field placement. US depth Is 1.38mm, which is .15mm in difference from previous imaging. The field placement and ultrasound imaging, per fraction, is separate and distinct from the initial simulation, and is an important task in providing safe administration of superficial radiation therapy. Physician evaluation of the ultrasound tumor depth will be ongoing throughout the course of treatment and is deemed medically necessary in order to ensure the efficacy of treatment and any necessary changes. Today’s image was evaluated for determination of continuation of treatment with the current plan or with necessary changes as appropriate. According to provider review of verification therapeutic radiology simulation-aided field setting and imaging, no change is required. Additionally, the use of ultrasound visualization and targeted assessment allows the patient to be able to see their cancer(s) progress, encouraging patient to complete and maintain compliance through full course of radiotherapy. Per Dr. Philip, continued ultrasound guidance and therapeutic radiology simulation-aided field setting verification per fraction is required for field placement, measurement of tumor depth, progress and acute effect monitoring. \\nPer the request of Dr. Philip, this patient was seen today for Superficial Radiation Therapy management (CPT® 39894). A high frequency ultrasound image was acquired today for three-dimensional evaluation of tumor volume and response to treatment, in addition to geometric accuracy of field placement (CPT® ). Physician evaluation of the ultrasound tumor depth will be ongoing through the course of treatment and is deemed medically necessary ensuring efficacy of treatment. Today’s image and setup was evaluated determining continuation of treatment with the current plan, or necessary changes as appropriate. All appropriate custom blocking and treatment parameters were verified by the Radiation therapist according to initial simulation. \\n\\n\\n\\n\\n\\n\\n

## 2021-12-08 NOTE — PROCEDURE: SUPERFICIAL RADIATION TREATMENT
Bill For Simulation And Treatment Device Design: Yes - (Simple Simulation: 12689) Bill For Simulation And Treatment Device Design: Yes - (Simple Simulation: 43955)

## 2021-12-10 ENCOUNTER — APPOINTMENT (OUTPATIENT)
Dept: URBAN - METROPOLITAN AREA CLINIC 321 | Age: 81
Setting detail: DERMATOLOGY
End: 2021-12-12

## 2021-12-10 PROBLEM — C44.311 BASAL CELL CARCINOMA OF SKIN OF NOSE: Status: ACTIVE | Noted: 2021-12-10

## 2021-12-10 PROCEDURE — OTHER TREATMENT REGIMEN: OTHER

## 2021-12-10 PROCEDURE — 77280 THER RAD SIMULAJ FIELD SMPL: CPT

## 2021-12-10 PROCEDURE — OTHER FOLLOW UP FOR NEXT VISIT: OTHER

## 2021-12-10 PROCEDURE — G6001 ECHO GUIDANCE RADIOTHERAPY: HCPCS

## 2021-12-10 PROCEDURE — OTHER SUPERFICIAL RADIATION TREATMENT: OTHER

## 2021-12-10 PROCEDURE — 77401 RADIATION TX DELIVERY SUPFC: CPT

## 2021-12-10 NOTE — PROCEDURE: TREATMENT REGIMEN
Detail Level: Zone
Plan: This patient has been treated today with image guided superficial radiation therapy for non-melanoma skin cancer. Written informed consent has been previously obtained from this patient for this treatment. This consent is documented in the patient’s chart. The patient gave verbal consent to continue treatment today. The patient was treated with a specific radiation dose and setup as prescribed by the provider listed on this visit note. A Radiation Therapist performed administration of radiation under supervision of provider. The treatment parameters and cumulative dose are indicated above. Prior to administering the radiation, the patient underwent a verification therapeutic radiology simulation-aided field setting defining relevant normal and abnormal target anatomy and acquiring images with high frequency ultrasound in addition to data necessary developing optimal radiation treatment process for the patient. This process includes verification of the treatment port(s) and proper treatment positioning. All treatment ports were photographed within electronic medical record. The patient’s customized lead blocking along with gross tumor volume and margin was confirmed. Considering superficial radiotherapy is clinical in setup, this requires physician and radiation therapist to clarify location interest being treated against initial images, pathology and patient anatomy. Care was taken ensuring fields treated were geometrically accurate and properly positioned using therapeutic radiology simulation-aided field setting verification per fraction. This process is also utilized to determine if any prescription or setup changes are necessary. These steps are therefore medically necessary ensuring safe and effective administration of radiation. Ongoing therapeutic radiology simulation-aided field setting verification is ordered throughout course of therapy.\\nA high frequency ultrasound image was acquired today for two-dimensional evaluation of the tumor volume and response to treatment, in addition to geometric accuracy of field placement. US depth Is 1.13mm, which is .25mm in difference from previous imaging. The field placement and ultrasound imaging, per fraction, is separate and distinct from the initial simulation, and is an important task in providing safe administration of superficial radiation therapy. Physician evaluation of the ultrasound tumor depth will be ongoing throughout the course of treatment and is deemed medically necessary in order to ensure the efficacy of treatment and any necessary changes. Today’s image was evaluated for determination of continuation of treatment with the current plan or with necessary changes as appropriate. According to provider review of verification therapeutic radiology simulation-aided field setting and imaging, no change is required. Additionally, the use of ultrasound visualization and targeted assessment allows the patient to be able to see their cancer(s) progress, encouraging patient to complete and maintain compliance through full course of radiotherapy. Per Dr. Philip, continued ultrasound guidance and therapeutic radiology simulation-aided field setting verification per fraction is required for field placement, measurement of tumor depth, progress and acute effect monitoring. \\n\\n

## 2021-12-10 NOTE — PROCEDURE: SUPERFICIAL RADIATION TREATMENT
Bill For Simulation And Treatment Device Design: Yes - (Simple Simulation: 73735) Bill For Simulation And Treatment Device Design: Yes - (Simple Simulation: 48609)

## 2021-12-12 ENCOUNTER — APPOINTMENT (OUTPATIENT)
Dept: URBAN - METROPOLITAN AREA CLINIC 321 | Age: 81
Setting detail: DERMATOLOGY
End: 2022-01-05

## 2021-12-12 PROCEDURE — 77336 RADIATION PHYSICS CONSULT: CPT

## 2021-12-13 ENCOUNTER — APPOINTMENT (OUTPATIENT)
Dept: URBAN - METROPOLITAN AREA CLINIC 321 | Age: 81
Setting detail: DERMATOLOGY
End: 2021-12-13

## 2021-12-13 PROBLEM — C44.311 BASAL CELL CARCINOMA OF SKIN OF NOSE: Status: ACTIVE | Noted: 2021-12-13

## 2021-12-13 PROCEDURE — 77401 RADIATION TX DELIVERY SUPFC: CPT

## 2021-12-13 PROCEDURE — OTHER FOLLOW UP FOR NEXT VISIT: OTHER

## 2021-12-13 PROCEDURE — 77280 THER RAD SIMULAJ FIELD SMPL: CPT

## 2021-12-13 PROCEDURE — G6001 ECHO GUIDANCE RADIOTHERAPY: HCPCS

## 2021-12-13 PROCEDURE — OTHER SUPERFICIAL RADIATION TREATMENT: OTHER

## 2021-12-13 PROCEDURE — OTHER TREATMENT REGIMEN: OTHER

## 2021-12-13 NOTE — PROCEDURE: TREATMENT REGIMEN
Plan: This patient has been treated today with image guided superficial radiation therapy for non-melanoma skin cancer. Written informed consent has been previously obtained from this patient for this treatment. This consent is documented in the patient’s chart. The patient gave verbal consent to continue treatment today. The patient was treated with a specific radiation dose and setup as prescribed by the provider listed on this visit note. A Radiation Therapist performed administration of radiation under supervision of provider. The treatment parameters and cumulative dose are indicated above. Prior to administering the radiation, the patient underwent a verification therapeutic radiology simulation-aided field setting defining relevant normal and abnormal target anatomy and acquiring images with high frequency ultrasound in addition to data necessary developing optimal radiation treatment process for the patient. This process includes verification of the treatment port(s) and proper treatment positioning. All treatment ports were photographed within electronic medical record. The patient’s customized lead blocking along with gross tumor volume and margin was confirmed. Considering superficial radiotherapy is clinical in setup, this requires physician and radiation therapist to clarify location interest being treated against initial images, pathology and patient anatomy. Care was taken ensuring fields treated were geometrically accurate and properly positioned using therapeutic radiology simulation-aided field setting verification per fraction. This process is also utilized to determine if any prescription or setup changes are necessary. These steps are therefore medically necessary ensuring safe and effective administration of radiation. Ongoing therapeutic radiology simulation-aided field setting verification is ordered throughout course of therapy.\\nA high frequency ultrasound image was acquired today for two-dimensional evaluation of the tumor volume and response to treatment, in addition to geometric accuracy of field placement. US depth Is 1.38mm, which is .25mm in difference from previous imaging. The field placement and ultrasound imaging, per fraction, is separate and distinct from the initial simulation, and is an important task in providing safe administration of superficial radiation therapy. Physician evaluation of the ultrasound tumor depth will be ongoing throughout the course of treatment and is deemed medically necessary in order to ensure the efficacy of treatment and any necessary changes. Today’s image was evaluated for determination of continuation of treatment with the current plan or with necessary changes as appropriate. According to provider review of verification therapeutic radiology simulation-aided field setting and imaging, no change is required. Additionally, the use of ultrasound visualization and targeted assessment allows the patient to be able to see their cancer(s) progress, encouraging patient to complete and maintain compliance through full course of radiotherapy. Per Dr. Philip, continued ultrasound guidance and therapeutic radiology simulation-aided field setting verification per fraction is required for field placement, measurement of tumor depth, progress and acute effect monitoring. \\n\\n
Detail Level: Zone

## 2021-12-13 NOTE — PROCEDURE: SUPERFICIAL RADIATION TREATMENT
Bill For Simulation And Treatment Device Design: Yes - (Simple Simulation: 43851) Bill For Simulation And Treatment Device Design: Yes - (Simple Simulation: 78756)

## 2021-12-14 ENCOUNTER — LAB ENCOUNTER (OUTPATIENT)
Dept: LAB | Age: 81
End: 2021-12-14
Attending: INTERNAL MEDICINE
Payer: MEDICARE

## 2021-12-14 DIAGNOSIS — E78.00 HYPERCHOLESTEROLEMIA: ICD-10-CM

## 2021-12-14 DIAGNOSIS — D69.6 THROMBOCYTOPENIA (HCC): ICD-10-CM

## 2021-12-14 DIAGNOSIS — E78.1 HYPERTRIGLYCERIDEMIA: ICD-10-CM

## 2021-12-14 DIAGNOSIS — E11.9 TYPE 2 DIABETES MELLITUS WITHOUT COMPLICATION, WITHOUT LONG-TERM CURRENT USE OF INSULIN (HCC): ICD-10-CM

## 2021-12-14 DIAGNOSIS — R53.83 FATIGUE, UNSPECIFIED TYPE: ICD-10-CM

## 2021-12-14 PROCEDURE — 80048 BASIC METABOLIC PNL TOTAL CA: CPT

## 2021-12-14 PROCEDURE — 84460 ALANINE AMINO (ALT) (SGPT): CPT

## 2021-12-14 PROCEDURE — 84450 TRANSFERASE (AST) (SGOT): CPT

## 2021-12-14 PROCEDURE — 85025 COMPLETE CBC W/AUTO DIFF WBC: CPT

## 2021-12-14 PROCEDURE — 83036 HEMOGLOBIN GLYCOSYLATED A1C: CPT

## 2021-12-14 PROCEDURE — 80061 LIPID PANEL: CPT

## 2021-12-14 PROCEDURE — 36415 COLL VENOUS BLD VENIPUNCTURE: CPT

## 2021-12-15 ENCOUNTER — APPOINTMENT (OUTPATIENT)
Dept: URBAN - METROPOLITAN AREA CLINIC 321 | Age: 81
Setting detail: DERMATOLOGY
End: 2021-12-15

## 2021-12-15 PROBLEM — C44.311 BASAL CELL CARCINOMA OF SKIN OF NOSE: Status: ACTIVE | Noted: 2021-12-15

## 2021-12-15 PROCEDURE — 77427 RADIATION TX MANAGEMENT X5: CPT

## 2021-12-15 PROCEDURE — G6001 ECHO GUIDANCE RADIOTHERAPY: HCPCS

## 2021-12-15 PROCEDURE — OTHER FOLLOW UP FOR NEXT VISIT: OTHER

## 2021-12-15 PROCEDURE — OTHER TREATMENT REGIMEN: OTHER

## 2021-12-15 PROCEDURE — 77280 THER RAD SIMULAJ FIELD SMPL: CPT

## 2021-12-15 PROCEDURE — OTHER SUPERFICIAL RADIATION TREATMENT: OTHER

## 2021-12-15 PROCEDURE — 77401 RADIATION TX DELIVERY SUPFC: CPT

## 2021-12-15 NOTE — PROCEDURE: SUPERFICIAL RADIATION TREATMENT
Bill For Simulation And Treatment Device Design: Yes - (Simple Simulation: 04579) Bill For Simulation And Treatment Device Design: Yes - (Simple Simulation: 56935)

## 2021-12-15 NOTE — PROCEDURE: TREATMENT REGIMEN
Plan: This patient has been treated today with image guided superficial radiation therapy for non-melanoma skin cancer. Written informed consent has been previously obtained from this patient for this treatment. This consent is documented in the patient’s chart. The patient gave verbal consent to continue treatment today. The patient was treated with a specific radiation dose and setup as prescribed by the provider listed on this visit note. A Radiation Therapist performed administration of radiation under supervision of provider. The treatment parameters and cumulative dose are indicated above. Prior to administering the radiation, the patient underwent a verification therapeutic radiology simulation-aided field setting defining relevant normal and abnormal target anatomy and acquiring images with high frequency ultrasound in addition to data necessary developing optimal radiation treatment process for the patient. This process includes verification of the treatment port(s) and proper treatment positioning. All treatment ports were photographed within electronic medical record. The patient’s customized lead blocking along with gross tumor volume and margin was confirmed. Considering superficial radiotherapy is clinical in setup, this requires physician and radiation therapist to clarify location interest being treated against initial images, pathology and patient anatomy. Care was taken ensuring fields treated were geometrically accurate and properly positioned using therapeutic radiology simulation-aided field setting verification per fraction. This process is also utilized to determine if any prescription or setup changes are necessary. These steps are therefore medically necessary ensuring safe and effective administration of radiation. Ongoing therapeutic radiology simulation-aided field setting verification is ordered throughout course of therapy.\\nA high frequency ultrasound image was acquired today for two-dimensional evaluation of the tumor volume and response to treatment, in addition to geometric accuracy of field placement. US depth Is 1.15mm, which is .23mm in difference from previous imaging. The field placement and ultrasound imaging, per fraction, is separate and distinct from the initial simulation, and is an important task in providing safe administration of superficial radiation therapy. Physician evaluation of the ultrasound tumor depth will be ongoing throughout the course of treatment and is deemed medically necessary in order to ensure the efficacy of treatment and any necessary changes. Today’s image was evaluated for determination of continuation of treatment with the current plan or with necessary changes as appropriate. According to provider review of verification therapeutic radiology simulation-aided field setting and imaging, no change is required. Additionally, the use of ultrasound visualization and targeted assessment allows the patient to be able to see their cancer(s) progress, encouraging patient to complete and maintain compliance through full course of radiotherapy. Per Dr. Philip, continued ultrasound guidance and therapeutic radiology simulation-aided field setting verification per fraction is required for field placement, measurement of tumor depth, progress and acute effect monitoring.\\n \\nPer the request of Dr. Philip, this patient was seen today for Superficial Radiation Therapy management (CPT® 06887). A high frequency ultrasound image was acquired today for three-dimensional evaluation of tumor volume and response to treatment, in addition to geometric accuracy of field placement (CPT® ). Physician evaluation of the ultrasound tumor depth will be ongoing through the course of treatment and is deemed medically necessary ensuring efficacy of treatment. Today’s image and setup was evaluated determining continuation of treatment with the current plan, or necessary changes as appropriate. All appropriate custom blocking and treatment parameters were verified by the Radiation therapist according to initial simulation. \\n\\nPer Dr. Philip, continued daily US guidance is necessary for measurement of tumor depth, progress and edema monitoring.\\nEvaluation for response and reaction to SRT based on current fraction and cumulative dose with a visual inspection and ultrasound demonstrates a normal expected response. Superficial Radiation Therapy will continue as planned.\\n\\nThe patient is undergoing superficial radiation therapy for skin cancer and presents for weekly evaluation and management. Per protocol and as documented on the flow sheet, the patient was questioned as to subjective redness, pruritus, pain, drainage, fatigue, or any other symptoms. Objectively, the radiation area was evaluated with regards to erythema, atrophy, scale, crusting, erosion, ulceration, edema, purpura, tenderness, warmth, drainage, and any other findings. The plan was extensively reviewed including dose, and dosing schedule. The simulation and clinical setup were also reviewed as was the external and any internal shields and based on this review the appropriateness and sufficiency of treatment was determined.\\nUS image was performed. US depth is 1.15mm.\\nPer Dr. Philip, continued daily US guidance and simulation is required for field placement, measurement of tumor depth, progress and edema monitoring. \\n\\nSubjective:\\nRedness\\nObjective:\\nErythema/Ulceration\\nAssessment:\\nAppropriate reaction\\nPlan: \\nContinue current treatment regimen\\nComments:\\nRTOG 2\\n\\n\\n Plan: This patient has been treated today with image guided superficial radiation therapy for non-melanoma skin cancer. Written informed consent has been previously obtained from this patient for this treatment. This consent is documented in the patient’s chart. The patient gave verbal consent to continue treatment today. The patient was treated with a specific radiation dose and setup as prescribed by the provider listed on this visit note. A Radiation Therapist performed administration of radiation under supervision of provider. The treatment parameters and cumulative dose are indicated above. Prior to administering the radiation, the patient underwent a verification therapeutic radiology simulation-aided field setting defining relevant normal and abnormal target anatomy and acquiring images with high frequency ultrasound in addition to data necessary developing optimal radiation treatment process for the patient. This process includes verification of the treatment port(s) and proper treatment positioning. All treatment ports were photographed within electronic medical record. The patient’s customized lead blocking along with gross tumor volume and margin was confirmed. Considering superficial radiotherapy is clinical in setup, this requires physician and radiation therapist to clarify location interest being treated against initial images, pathology and patient anatomy. Care was taken ensuring fields treated were geometrically accurate and properly positioned using therapeutic radiology simulation-aided field setting verification per fraction. This process is also utilized to determine if any prescription or setup changes are necessary. These steps are therefore medically necessary ensuring safe and effective administration of radiation. Ongoing therapeutic radiology simulation-aided field setting verification is ordered throughout course of therapy.\\nA high frequency ultrasound image was acquired today for two-dimensional evaluation of the tumor volume and response to treatment, in addition to geometric accuracy of field placement. US depth Is 1.15mm, which is .23mm in difference from previous imaging. The field placement and ultrasound imaging, per fraction, is separate and distinct from the initial simulation, and is an important task in providing safe administration of superficial radiation therapy. Physician evaluation of the ultrasound tumor depth will be ongoing throughout the course of treatment and is deemed medically necessary in order to ensure the efficacy of treatment and any necessary changes. Today’s image was evaluated for determination of continuation of treatment with the current plan or with necessary changes as appropriate. According to provider review of verification therapeutic radiology simulation-aided field setting and imaging, no change is required. Additionally, the use of ultrasound visualization and targeted assessment allows the patient to be able to see their cancer(s) progress, encouraging patient to complete and maintain compliance through full course of radiotherapy. Per Dr. Philip, continued ultrasound guidance and therapeutic radiology simulation-aided field setting verification per fraction is required for field placement, measurement of tumor depth, progress and acute effect monitoring.\\n \\nPer the request of Dr. Philip, this patient was seen today for Superficial Radiation Therapy management (CPT® 69839). A high frequency ultrasound image was acquired today for three-dimensional evaluation of tumor volume and response to treatment, in addition to geometric accuracy of field placement (CPT® ). Physician evaluation of the ultrasound tumor depth will be ongoing through the course of treatment and is deemed medically necessary ensuring efficacy of treatment. Today’s image and setup was evaluated determining continuation of treatment with the current plan, or necessary changes as appropriate. All appropriate custom blocking and treatment parameters were verified by the Radiation therapist according to initial simulation. \\n\\nPer Dr. Philip, continued daily US guidance is necessary for measurement of tumor depth, progress and edema monitoring.\\nEvaluation for response and reaction to SRT based on current fraction and cumulative dose with a visual inspection and ultrasound demonstrates a normal expected response. Superficial Radiation Therapy will continue as planned.\\n\\nThe patient is undergoing superficial radiation therapy for skin cancer and presents for weekly evaluation and management. Per protocol and as documented on the flow sheet, the patient was questioned as to subjective redness, pruritus, pain, drainage, fatigue, or any other symptoms. Objectively, the radiation area was evaluated with regards to erythema, atrophy, scale, crusting, erosion, ulceration, edema, purpura, tenderness, warmth, drainage, and any other findings. The plan was extensively reviewed including dose, and dosing schedule. The simulation and clinical setup were also reviewed as was the external and any internal shields and based on this review the appropriateness and sufficiency of treatment was determined.\\nUS image was performed. US depth is 1.15mm.\\nPer Dr. Philip, continued daily US guidance and simulation is required for field placement, measurement of tumor depth, progress and edema monitoring. \\n\\nSubjective:\\nRedness\\nObjective:\\nErythema/Ulceration\\nAssessment:\\nAppropriate reaction\\nPlan: \\nContinue current treatment regimen\\nComments:\\nRTOG 2\\n\\n\\n

## 2021-12-17 ENCOUNTER — APPOINTMENT (OUTPATIENT)
Dept: URBAN - METROPOLITAN AREA CLINIC 321 | Age: 81
Setting detail: DERMATOLOGY
End: 2021-12-20

## 2021-12-17 PROBLEM — C44.311 BASAL CELL CARCINOMA OF SKIN OF NOSE: Status: ACTIVE | Noted: 2021-12-17

## 2021-12-17 PROCEDURE — OTHER SUPERFICIAL RADIATION TREATMENT: OTHER

## 2021-12-17 PROCEDURE — OTHER TREATMENT REGIMEN: OTHER

## 2021-12-17 PROCEDURE — OTHER FOLLOW UP FOR NEXT VISIT: OTHER

## 2021-12-17 PROCEDURE — 77280 THER RAD SIMULAJ FIELD SMPL: CPT

## 2021-12-17 PROCEDURE — G6001 ECHO GUIDANCE RADIOTHERAPY: HCPCS

## 2021-12-17 PROCEDURE — 77401 RADIATION TX DELIVERY SUPFC: CPT

## 2021-12-17 NOTE — PROCEDURE: SUPERFICIAL RADIATION TREATMENT
Bill For Simulation And Treatment Device Design: Yes - (Simple Simulation: 93459) Bill For Simulation And Treatment Device Design: Yes - (Simple Simulation: 02599)

## 2021-12-17 NOTE — PROCEDURE: TREATMENT REGIMEN
Detail Level: Zone
Plan: This patient has been treated today with image guided superficial radiation therapy for non-melanoma skin cancer. Written informed consent has been previously obtained from this patient for this treatment. This consent is documented in the patient’s chart. The patient gave verbal consent to continue treatment today. The patient was treated with a specific radiation dose and setup as prescribed by the provider listed on this visit note. A Radiation Therapist performed administration of radiation under supervision of provider. The treatment parameters and cumulative dose are indicated above. Prior to administering the radiation, the patient underwent a verification therapeutic radiology simulation-aided field setting defining relevant normal and abnormal target anatomy and acquiring images with high frequency ultrasound in addition to data necessary developing optimal radiation treatment process for the patient. This process includes verification of the treatment port(s) and proper treatment positioning. All treatment ports were photographed within electronic medical record. The patient’s customized lead blocking along with gross tumor volume and margin was confirmed. Considering superficial radiotherapy is clinical in setup, this requires physician and radiation therapist to clarify location interest being treated against initial images, pathology and patient anatomy. Care was taken ensuring fields treated were geometrically accurate and properly positioned using therapeutic radiology simulation-aided field setting verification per fraction. This process is also utilized to determine if any prescription or setup changes are necessary. These steps are therefore medically necessary ensuring safe and effective administration of radiation. Ongoing therapeutic radiology simulation-aided field setting verification is ordered throughout course of therapy.\\nA high frequency ultrasound image was acquired today for two-dimensional evaluation of the tumor volume and response to treatment, in addition to geometric accuracy of field placement. US depth Is 1.21mm, which is .06mm in difference from previous imaging. The field placement and ultrasound imaging, per fraction, is separate and distinct from the initial simulation, and is an important task in providing safe administration of superficial radiation therapy. Physician evaluation of the ultrasound tumor depth will be ongoing throughout the course of treatment and is deemed medically necessary in order to ensure the efficacy of treatment and any necessary changes. Today’s image was evaluated for determination of continuation of treatment with the current plan or with necessary changes as appropriate. According to provider review of verification therapeutic radiology simulation-aided field setting and imaging, no change is required. Additionally, the use of ultrasound visualization and targeted assessment allows the patient to be able to see their cancer(s) progress, encouraging patient to complete and maintain compliance through full course of radiotherapy. Per Dr. Philip, continued ultrasound guidance and therapeutic radiology simulation-aided field setting verification per fraction is required for field placement, measurement of tumor depth, progress and acute effect monitoring.\\n \\n\\n

## 2021-12-20 ENCOUNTER — APPOINTMENT (OUTPATIENT)
Dept: URBAN - METROPOLITAN AREA CLINIC 321 | Age: 81
Setting detail: DERMATOLOGY
End: 2021-12-20

## 2021-12-20 PROBLEM — C44.311 BASAL CELL CARCINOMA OF SKIN OF NOSE: Status: ACTIVE | Noted: 2021-12-20

## 2021-12-20 PROCEDURE — 77401 RADIATION TX DELIVERY SUPFC: CPT

## 2021-12-20 PROCEDURE — OTHER FOLLOW UP FOR NEXT VISIT: OTHER

## 2021-12-20 PROCEDURE — 77280 THER RAD SIMULAJ FIELD SMPL: CPT

## 2021-12-20 PROCEDURE — G6001 ECHO GUIDANCE RADIOTHERAPY: HCPCS

## 2021-12-20 PROCEDURE — OTHER TREATMENT REGIMEN: OTHER

## 2021-12-20 PROCEDURE — OTHER SUPERFICIAL RADIATION TREATMENT: OTHER

## 2021-12-20 NOTE — PROCEDURE: TREATMENT REGIMEN
Plan: This patient has been treated today with image guided superficial radiation therapy for non-melanoma skin cancer. Written informed consent has been previously obtained from this patient for this treatment. This consent is documented in the patient’s chart. The patient gave verbal consent to continue treatment today. The patient was treated with a specific radiation dose and setup as prescribed by the provider listed on this visit note. A Radiation Therapist performed administration of radiation under supervision of provider. The treatment parameters and cumulative dose are indicated above. Prior to administering the radiation, the patient underwent a verification therapeutic radiology simulation-aided field setting defining relevant normal and abnormal target anatomy and acquiring images with high frequency ultrasound in addition to data necessary developing optimal radiation treatment process for the patient. This process includes verification of the treatment port(s) and proper treatment positioning. All treatment ports were photographed within electronic medical record. The patient’s customized lead blocking along with gross tumor volume and margin was confirmed. Considering superficial radiotherapy is clinical in setup, this requires physician and radiation therapist to clarify location interest being treated against initial images, pathology and patient anatomy. Care was taken ensuring fields treated were geometrically accurate and properly positioned using therapeutic radiology simulation-aided field setting verification per fraction. This process is also utilized to determine if any prescription or setup changes are necessary. These steps are therefore medically necessary ensuring safe and effective administration of radiation. Ongoing therapeutic radiology simulation-aided field setting verification is ordered throughout course of therapy.\\nA high frequency ultrasound image was acquired today for two-dimensional evaluation of the tumor volume and response to treatment, in addition to geometric accuracy of field placement. US depth Is .96mm, which is .25mm in difference from previous imaging. The field placement and ultrasound imaging, per fraction, is separate and distinct from the initial simulation, and is an important task in providing safe administration of superficial radiation therapy. Physician evaluation of the ultrasound tumor depth will be ongoing throughout the course of treatment and is deemed medically necessary in order to ensure the efficacy of treatment and any necessary changes. Today’s image was evaluated for determination of continuation of treatment with the current plan or with necessary changes as appropriate. According to provider review of verification therapeutic radiology simulation-aided field setting and imaging, no change is required. Additionally, the use of ultrasound visualization and targeted assessment allows the patient to be able to see their cancer(s) progress, encouraging patient to complete and maintain compliance through full course of radiotherapy. Per Dr. Philip, continued ultrasound guidance and therapeutic radiology simulation-aided field setting verification per fraction is required for field placement, measurement of tumor depth, progress and acute effect monitoring.\\n \\n\\n
Detail Level: Zone

## 2021-12-20 NOTE — PROCEDURE: SUPERFICIAL RADIATION TREATMENT
Bill For Simulation And Treatment Device Design: Yes - (Simple Simulation: 51293) Bill For Simulation And Treatment Device Design: Yes - (Simple Simulation: 83774)

## 2021-12-22 ENCOUNTER — APPOINTMENT (OUTPATIENT)
Dept: URBAN - METROPOLITAN AREA CLINIC 321 | Age: 81
Setting detail: DERMATOLOGY
End: 2021-12-23

## 2021-12-22 PROBLEM — C44.311 BASAL CELL CARCINOMA OF SKIN OF NOSE: Status: ACTIVE | Noted: 2021-12-22

## 2021-12-22 PROCEDURE — 77401 RADIATION TX DELIVERY SUPFC: CPT

## 2021-12-22 PROCEDURE — OTHER SUPERFICIAL RADIATION TREATMENT: OTHER

## 2021-12-22 PROCEDURE — OTHER TREATMENT REGIMEN: OTHER

## 2021-12-22 PROCEDURE — G6001 ECHO GUIDANCE RADIOTHERAPY: HCPCS

## 2021-12-22 PROCEDURE — 77280 THER RAD SIMULAJ FIELD SMPL: CPT

## 2021-12-22 PROCEDURE — OTHER FOLLOW UP FOR NEXT VISIT: OTHER

## 2021-12-22 NOTE — PROCEDURE: SUPERFICIAL RADIATION TREATMENT
Bill For Simulation And Treatment Device Design: Yes - (Simple Simulation: 81126) Bill For Simulation And Treatment Device Design: Yes - (Simple Simulation: 18524)

## 2021-12-22 NOTE — PROCEDURE: TREATMENT REGIMEN
Plan: This patient has been treated today with image guided superficial radiation therapy for non-melanoma skin cancer. Written informed consent has been previously obtained from this patient for this treatment. This consent is documented in the patient’s chart. The patient gave verbal consent to continue treatment today. The patient was treated with a specific radiation dose and setup as prescribed by the provider listed on this visit note. A Radiation Therapist performed administration of radiation under supervision of provider. The treatment parameters and cumulative dose are indicated above. Prior to administering the radiation, the patient underwent a verification therapeutic radiology simulation-aided field setting defining relevant normal and abnormal target anatomy and acquiring images with high frequency ultrasound in addition to data necessary developing optimal radiation treatment process for the patient. This process includes verification of the treatment port(s) and proper treatment positioning. All treatment ports were photographed within electronic medical record. The patient’s customized lead blocking along with gross tumor volume and margin was confirmed. Considering superficial radiotherapy is clinical in setup, this requires physician and radiation therapist to clarify location interest being treated against initial images, pathology and patient anatomy. Care was taken ensuring fields treated were geometrically accurate and properly positioned using therapeutic radiology simulation-aided field setting verification per fraction. This process is also utilized to determine if any prescription or setup changes are necessary. These steps are therefore medically necessary ensuring safe and effective administration of radiation. Ongoing therapeutic radiology simulation-aided field setting verification is ordered throughout course of therapy.\\nA high frequency ultrasound image was acquired today for two-dimensional evaluation of the tumor volume and response to treatment, in addition to geometric accuracy of field placement. US depth Is .95mm, which is .01mm in difference from previous imaging. The field placement and ultrasound imaging, per fraction, is separate and distinct from the initial simulation, and is an important task in providing safe administration of superficial radiation therapy. Physician evaluation of the ultrasound tumor depth will be ongoing throughout the course of treatment and is deemed medically necessary in order to ensure the efficacy of treatment and any necessary changes. Today’s image was evaluated for determination of continuation of treatment with the current plan or with necessary changes as appropriate. According to provider review of verification therapeutic radiology simulation-aided field setting and imaging, no change is required. Additionally, the use of ultrasound visualization and targeted assessment allows the patient to be able to see their cancer(s) progress, encouraging patient to complete and maintain compliance through full course of radiotherapy. Per Dr. Philip, continued ultrasound guidance and therapeutic radiology simulation-aided field setting verification per fraction is required for field placement, measurement of tumor depth, progress and acute effect monitoring.\\n \\n\\n
Detail Level: Zone

## 2021-12-27 ENCOUNTER — APPOINTMENT (OUTPATIENT)
Dept: URBAN - METROPOLITAN AREA CLINIC 321 | Age: 81
Setting detail: DERMATOLOGY
End: 2022-01-05

## 2021-12-27 PROBLEM — C44.311 BASAL CELL CARCINOMA OF SKIN OF NOSE: Status: ACTIVE | Noted: 2021-12-27

## 2021-12-27 PROCEDURE — OTHER TREATMENT REGIMEN: OTHER

## 2021-12-27 PROCEDURE — 77401 RADIATION TX DELIVERY SUPFC: CPT

## 2021-12-27 PROCEDURE — OTHER FOLLOW UP FOR NEXT VISIT: OTHER

## 2021-12-27 PROCEDURE — 77280 THER RAD SIMULAJ FIELD SMPL: CPT

## 2021-12-27 PROCEDURE — OTHER SUPERFICIAL RADIATION TREATMENT: OTHER

## 2021-12-27 PROCEDURE — G6001 ECHO GUIDANCE RADIOTHERAPY: HCPCS

## 2021-12-27 NOTE — PROCEDURE: TREATMENT REGIMEN
Detail Level: Zone
Plan: This patient has been treated today with image guided superficial radiation therapy for non-melanoma skin cancer. Written informed consent has been previously obtained from this patient for this treatment. This consent is documented in the patient’s chart. The patient gave verbal consent to continue treatment today. The patient was treated with a specific radiation dose and setup as prescribed by the provider listed on this visit note. A Radiation Therapist performed administration of radiation under supervision of provider. The treatment parameters and cumulative dose are indicated above. Prior to administering the radiation, the patient underwent a verification therapeutic radiology simulation-aided field setting defining relevant normal and abnormal target anatomy and acquiring images with high frequency ultrasound in addition to data necessary developing optimal radiation treatment process for the patient. This process includes verification of the treatment port(s) and proper treatment positioning. All treatment ports were photographed within electronic medical record. The patient’s customized lead blocking along with gross tumor volume and margin was confirmed. Considering superficial radiotherapy is clinical in setup, this requires physician and radiation therapist to clarify location interest being treated against initial images, pathology and patient anatomy. Care was taken ensuring fields treated were geometrically accurate and properly positioned using therapeutic radiology simulation-aided field setting verification per fraction. This process is also utilized to determine if any prescription or setup changes are necessary. These steps are therefore medically necessary ensuring safe and effective administration of radiation. Ongoing therapeutic radiology simulation-aided field setting verification is ordered throughout course of therapy.\\nA high frequency ultrasound image was acquired today for two-dimensional evaluation of the tumor volume and response to treatment, in addition to geometric accuracy of field placement. US depth Is 1.17mm, which is .22mm in difference from previous imaging. The field placement and ultrasound imaging, per fraction, is separate and distinct from the initial simulation, and is an important task in providing safe administration of superficial radiation therapy. Physician evaluation of the ultrasound tumor depth will be ongoing throughout the course of treatment and is deemed medically necessary in order to ensure the efficacy of treatment and any necessary changes. Today’s image was evaluated for determination of continuation of treatment with the current plan or with necessary changes as appropriate. According to provider review of verification therapeutic radiology simulation-aided field setting and imaging, no change is required. Additionally, the use of ultrasound visualization and targeted assessment allows the patient to be able to see their cancer(s) progress, encouraging patient to complete and maintain compliance through full course of radiotherapy. Per Dr. Philip, continued ultrasound guidance and therapeutic radiology simulation-aided field setting verification per fraction is required for field placement, measurement of tumor depth, progress and acute effect monitoring.\\n \\n\\n
Plan: This patient has been treated today with image guided superficial radiation therapy for non-melanoma skin cancer. Written informed consent has been previously obtained from this patient for this treatment. This consent is documented in the patient’s chart. The patient gave verbal consent to continue treatment today. The patient was treated with a specific radiation dose and setup as prescribed by the provider listed on this visit note. A Radiation Therapist performed administration of radiation under supervision of provider. The treatment parameters and cumulative dose are indicated above. Prior to administering the radiation, the patient underwent a verification therapeutic radiology simulation-aided field setting defining relevant normal and abnormal target anatomy and acquiring images with high frequency ultrasound in addition to data necessary developing optimal radiation treatment process for the patient. This process includes verification of the treatment port(s) and proper treatment positioning. All treatment ports were photographed within electronic medical record. The patient’s customized lead blocking along with gross tumor volume and margin was confirmed. Considering superficial radiotherapy is clinical in setup, this requires physician and radiation therapist to clarify location interest being treated against initial images, pathology and patient anatomy. Care was taken ensuring fields treated were geometrically accurate and properly positioned using therapeutic radiology simulation-aided field setting verification per fraction. This process is also utilized to determine if any prescription or setup changes are necessary. These steps are therefore medically necessary ensuring safe and effective administration of radiation. Ongoing therapeutic radiology simulation-aided field setting verification is ordered throughout course of therapy.\\nA high frequency ultrasound image was acquired today for two-dimensional evaluation of the tumor volume and response to treatment, in addition to geometric accuracy of field placement. US depth Is .95mm, which is .01mm in difference from previous imaging. The field placement and ultrasound imaging, per fraction, is separate and distinct from the initial simulation, and is an important task in providing safe administration of superficial radiation therapy. Physician evaluation of the ultrasound tumor depth will be ongoing throughout the course of treatment and is deemed medically necessary in order to ensure the efficacy of treatment and any necessary changes. Today’s image was evaluated for determination of continuation of treatment with the current plan or with necessary changes as appropriate. According to provider review of verification therapeutic radiology simulation-aided field setting and imaging, no change is required. Additionally, the use of ultrasound visualization and targeted assessment allows the patient to be able to see their cancer(s) progress, encouraging patient to complete and maintain compliance through full course of radiotherapy. Per Dr. Philip, continued ultrasound guidance and therapeutic radiology simulation-aided field setting verification per fraction is required for field placement, measurement of tumor depth, progress and acute effect monitoring.\\n \\n\\n

## 2021-12-27 NOTE — PROCEDURE: SUPERFICIAL RADIATION TREATMENT
Bill For Simulation And Treatment Device Design: Yes - (Simple Simulation: 19523) Bill For Simulation And Treatment Device Design: Yes - (Simple Simulation: 21205)

## 2021-12-29 ENCOUNTER — APPOINTMENT (OUTPATIENT)
Dept: URBAN - METROPOLITAN AREA CLINIC 321 | Age: 81
Setting detail: DERMATOLOGY
End: 2022-01-05

## 2021-12-29 PROBLEM — C44.311 BASAL CELL CARCINOMA OF SKIN OF NOSE: Status: ACTIVE | Noted: 2021-12-29

## 2021-12-29 PROCEDURE — G6001 ECHO GUIDANCE RADIOTHERAPY: HCPCS

## 2021-12-29 PROCEDURE — 77280 THER RAD SIMULAJ FIELD SMPL: CPT

## 2021-12-29 PROCEDURE — 77427 RADIATION TX MANAGEMENT X5: CPT

## 2021-12-29 PROCEDURE — 77401 RADIATION TX DELIVERY SUPFC: CPT

## 2021-12-29 PROCEDURE — OTHER FOLLOW UP FOR NEXT VISIT: OTHER

## 2021-12-29 PROCEDURE — OTHER SUPERFICIAL RADIATION TREATMENT: OTHER

## 2021-12-29 PROCEDURE — OTHER TREATMENT REGIMEN: OTHER

## 2021-12-29 NOTE — PROCEDURE: TREATMENT REGIMEN
Plan: This patient has been treated today with image guided superficial radiation therapy for non-melanoma skin cancer. Written informed consent has been previously obtained from this patient for this treatment. This consent is documented in the patient’s chart. The patient gave verbal consent to continue treatment today. The patient was treated with a specific radiation dose and setup as prescribed by the provider listed on this visit note. A Radiation Therapist performed administration of radiation under supervision of provider. The treatment parameters and cumulative dose are indicated above. Prior to administering the radiation, the patient underwent a verification therapeutic radiology simulation-aided field setting defining relevant normal and abnormal target anatomy and acquiring images with high frequency ultrasound in addition to data necessary developing optimal radiation treatment process for the patient. This process includes verification of the treatment port(s) and proper treatment positioning. All treatment ports were photographed within electronic medical record. The patient’s customized lead blocking along with gross tumor volume and margin was confirmed. Considering superficial radiotherapy is clinical in setup, this requires physician and radiation therapist to clarify location interest being treated against initial images, pathology and patient anatomy. Care was taken ensuring fields treated were geometrically accurate and properly positioned using therapeutic radiology simulation-aided field setting verification per fraction. This process is also utilized to determine if any prescription or setup changes are necessary. These steps are therefore medically necessary ensuring safe and effective administration of radiation. Ongoing therapeutic radiology simulation-aided field setting verification is ordered throughout course of therapy.\\nA high frequency ultrasound image was acquired today for two-dimensional evaluation of the tumor volume and response to treatment, in addition to geometric accuracy of field placement. US depth Is .89mm, which is .28mm in difference from previous imaging. The field placement and ultrasound imaging, per fraction, is separate and distinct from the initial simulation, and is an important task in providing safe administration of superficial radiation therapy. Physician evaluation of the ultrasound tumor depth will be ongoing throughout the course of treatment and is deemed medically necessary in order to ensure the efficacy of treatment and any necessary changes. Today’s image was evaluated for determination of continuation of treatment with the current plan or with necessary changes as appropriate. According to provider review of verification therapeutic radiology simulation-aided field setting and imaging, no change is required. Additionally, the use of ultrasound visualization and targeted assessment allows the patient to be able to see their cancer(s) progress, encouraging patient to complete and maintain compliance through full course of radiotherapy. Per Dr. Philip, continued ultrasound guidance and therapeutic radiology simulation-aided field setting verification per fraction is required for field placement, measurement of tumor depth, progress and acute effect monitoring.\\n\\n Per the request of Dr. Philip, this patient was seen today for Superficial Radiation Therapy management (CPT® 74452). A high frequency ultrasound image was acquired today for three-dimensional evaluation of tumor volume and response to treatment, in addition to geometric accuracy of field placement (CPT® ). Physician evaluation of the ultrasound tumor depth will be ongoing through the course of treatment and is deemed medically necessary ensuring efficacy of treatment. Today’s image and setup was evaluated determining continuation of treatment with the current plan, or necessary changes as appropriate. All appropriate custom blocking and treatment parameters were verified by the Radiation therapist according to initial simulation. \\n\\nPer Dr. Philip, continued daily US guidance is necessary for measurement of tumor depth, progress and edema monitoring.\\nEvaluation for response and reaction to SRT based on current fraction and cumulative dose with a visual inspection and ultrasound demonstrates a normal expected response. Superficial Radiation Therapy will continue as planned.\\n\\nThe patient is undergoing superficial radiation therapy for skin cancer and presents for weekly evaluation and management. Per protocol and as documented on the flow sheet, the patient was questioned as to subjective redness, pruritus, pain, drainage, fatigue, or any other symptoms. Objectively, the radiation area was evaluated with regards to erythema, atrophy, scale, crusting, erosion, ulceration, edema, purpura, tenderness, warmth, drainage, and any other findings. The plan was extensively reviewed including dose, and dosing schedule. The simulation and clinical setup were also reviewed as was the external and any internal shields and based on this review the appropriateness and sufficiency of treatment was determined.\\nUS image was performed. US depth is .89mm.\\nPer Dr. Philip, continued daily US guidance and simulation is required for field placement, measurement of tumor depth, progress and edema monitoring. \\n\\nSubjective:\\nLight redness\\nObjective:\\nLight erythema\\nDry desquamation\\nAssessment:\\nAppropriate reaction\\nPlan: \\nContinue current treatment regimen\\nComments:\\nRTOG 1\\n\\n\\n\\n\\n Plan: This patient has been treated today with image guided superficial radiation therapy for non-melanoma skin cancer. Written informed consent has been previously obtained from this patient for this treatment. This consent is documented in the patient’s chart. The patient gave verbal consent to continue treatment today. The patient was treated with a specific radiation dose and setup as prescribed by the provider listed on this visit note. A Radiation Therapist performed administration of radiation under supervision of provider. The treatment parameters and cumulative dose are indicated above. Prior to administering the radiation, the patient underwent a verification therapeutic radiology simulation-aided field setting defining relevant normal and abnormal target anatomy and acquiring images with high frequency ultrasound in addition to data necessary developing optimal radiation treatment process for the patient. This process includes verification of the treatment port(s) and proper treatment positioning. All treatment ports were photographed within electronic medical record. The patient’s customized lead blocking along with gross tumor volume and margin was confirmed. Considering superficial radiotherapy is clinical in setup, this requires physician and radiation therapist to clarify location interest being treated against initial images, pathology and patient anatomy. Care was taken ensuring fields treated were geometrically accurate and properly positioned using therapeutic radiology simulation-aided field setting verification per fraction. This process is also utilized to determine if any prescription or setup changes are necessary. These steps are therefore medically necessary ensuring safe and effective administration of radiation. Ongoing therapeutic radiology simulation-aided field setting verification is ordered throughout course of therapy.\\nA high frequency ultrasound image was acquired today for two-dimensional evaluation of the tumor volume and response to treatment, in addition to geometric accuracy of field placement. US depth Is .89mm, which is .28mm in difference from previous imaging. The field placement and ultrasound imaging, per fraction, is separate and distinct from the initial simulation, and is an important task in providing safe administration of superficial radiation therapy. Physician evaluation of the ultrasound tumor depth will be ongoing throughout the course of treatment and is deemed medically necessary in order to ensure the efficacy of treatment and any necessary changes. Today’s image was evaluated for determination of continuation of treatment with the current plan or with necessary changes as appropriate. According to provider review of verification therapeutic radiology simulation-aided field setting and imaging, no change is required. Additionally, the use of ultrasound visualization and targeted assessment allows the patient to be able to see their cancer(s) progress, encouraging patient to complete and maintain compliance through full course of radiotherapy. Per Dr. Philip, continued ultrasound guidance and therapeutic radiology simulation-aided field setting verification per fraction is required for field placement, measurement of tumor depth, progress and acute effect monitoring.\\n\\n Per the request of Dr. Philip, this patient was seen today for Superficial Radiation Therapy management (CPT® 53009). A high frequency ultrasound image was acquired today for three-dimensional evaluation of tumor volume and response to treatment, in addition to geometric accuracy of field placement (CPT® ). Physician evaluation of the ultrasound tumor depth will be ongoing through the course of treatment and is deemed medically necessary ensuring efficacy of treatment. Today’s image and setup was evaluated determining continuation of treatment with the current plan, or necessary changes as appropriate. All appropriate custom blocking and treatment parameters were verified by the Radiation therapist according to initial simulation. \\n\\nPer Dr. Philip, continued daily US guidance is necessary for measurement of tumor depth, progress and edema monitoring.\\nEvaluation for response and reaction to SRT based on current fraction and cumulative dose with a visual inspection and ultrasound demonstrates a normal expected response. Superficial Radiation Therapy will continue as planned.\\n\\nThe patient is undergoing superficial radiation therapy for skin cancer and presents for weekly evaluation and management. Per protocol and as documented on the flow sheet, the patient was questioned as to subjective redness, pruritus, pain, drainage, fatigue, or any other symptoms. Objectively, the radiation area was evaluated with regards to erythema, atrophy, scale, crusting, erosion, ulceration, edema, purpura, tenderness, warmth, drainage, and any other findings. The plan was extensively reviewed including dose, and dosing schedule. The simulation and clinical setup were also reviewed as was the external and any internal shields and based on this review the appropriateness and sufficiency of treatment was determined.\\nUS image was performed. US depth is .89mm.\\nPer Dr. Philip, continued daily US guidance and simulation is required for field placement, measurement of tumor depth, progress and edema monitoring. \\n\\nSubjective:\\nLight redness\\nObjective:\\nLight erythema\\nDry desquamation\\nAssessment:\\nAppropriate reaction\\nPlan: \\nContinue current treatment regimen\\nComments:\\nRTOG 1\\n\\n\\n\\n\\n

## 2021-12-29 NOTE — PROCEDURE: FOLLOW UP FOR NEXT VISIT
Instructions (Optional): MD advised patient apply Vaseline until crustiness is gone. Follow up 2/11/22@10:00
Detail Level: Simple

## 2021-12-29 NOTE — PROCEDURE: SUPERFICIAL RADIATION TREATMENT
Bill For Simulation And Treatment Device Design: Yes - (Simple Simulation: 53329) Bill For Simulation And Treatment Device Design: Yes - (Simple Simulation: 18974)

## 2022-01-01 ENCOUNTER — APPOINTMENT (OUTPATIENT)
Dept: URBAN - METROPOLITAN AREA CLINIC 321 | Age: 82
Setting detail: DERMATOLOGY
End: 2022-01-10

## 2022-01-01 PROCEDURE — 77336 RADIATION PHYSICS CONSULT: CPT

## 2022-01-01 RX ORDER — DULOXETIN HYDROCHLORIDE 30 MG/1
CAPSULE, DELAYED RELEASE ORAL
Qty: 90 CAPSULE | Refills: 3 | Status: SHIPPED | OUTPATIENT
Start: 2022-01-01

## 2022-01-28 RX ORDER — GLIMEPIRIDE 2 MG/1
TABLET ORAL
Qty: 90 TABLET | Refills: 0 | Status: SHIPPED | OUTPATIENT
Start: 2022-01-28

## 2022-01-28 RX ORDER — IPRATROPIUM BROMIDE 42 UG/1
SPRAY, METERED NASAL
Qty: 30 ML | Refills: 3 | Status: SHIPPED | OUTPATIENT
Start: 2022-01-28

## 2022-02-17 ENCOUNTER — APPOINTMENT (OUTPATIENT)
Dept: URBAN - METROPOLITAN AREA CLINIC 321 | Age: 82
Setting detail: DERMATOLOGY
End: 2022-02-17

## 2022-02-17 DIAGNOSIS — L82.1 OTHER SEBORRHEIC KERATOSIS: ICD-10-CM

## 2022-02-17 DIAGNOSIS — Z86.007 PERSONAL HISTORY OF IN-SITU NEOPLASM OF SKIN: ICD-10-CM

## 2022-02-17 DIAGNOSIS — L81.4 OTHER MELANIN HYPERPIGMENTATION: ICD-10-CM

## 2022-02-17 DIAGNOSIS — D22 MELANOCYTIC NEVI: ICD-10-CM

## 2022-02-17 DIAGNOSIS — Z85.828 PERSONAL HISTORY OF OTHER MALIGNANT NEOPLASM OF SKIN: ICD-10-CM

## 2022-02-17 PROBLEM — D22.5 MELANOCYTIC NEVI OF TRUNK: Status: ACTIVE | Noted: 2022-02-17

## 2022-02-17 PROBLEM — D22.62 MELANOCYTIC NEVI OF LEFT UPPER LIMB, INCLUDING SHOULDER: Status: ACTIVE | Noted: 2022-02-17

## 2022-02-17 PROBLEM — D22.61 MELANOCYTIC NEVI OF RIGHT UPPER LIMB, INCLUDING SHOULDER: Status: ACTIVE | Noted: 2022-02-17

## 2022-02-17 PROCEDURE — OTHER COUNSELING: OTHER

## 2022-02-17 PROCEDURE — 99213 OFFICE O/P EST LOW 20 MIN: CPT

## 2022-02-17 ASSESSMENT — LOCATION DETAILED DESCRIPTION DERM
LOCATION DETAILED: UPPER STERNUM
LOCATION DETAILED: LEFT SUPERIOR MEDIAL UPPER BACK
LOCATION DETAILED: RIGHT MEDIAL SUPERIOR CHEST
LOCATION DETAILED: LEFT ANTECUBITAL SKIN
LOCATION DETAILED: MIDDLE STERNUM
LOCATION DETAILED: LEFT VENTRAL PROXIMAL FOREARM
LOCATION DETAILED: LEFT ANTERIOR DISTAL UPPER ARM
LOCATION DETAILED: LEFT NASAL ALA
LOCATION DETAILED: RIGHT PROXIMAL PRETIBIAL REGION
LOCATION DETAILED: RIGHT ANTECUBITAL SKIN
LOCATION DETAILED: RIGHT ANTERIOR DISTAL UPPER ARM
LOCATION DETAILED: LEFT SUPERIOR MEDIAL UPPER BACK
LOCATION DETAILED: LEFT UPPER CUTANEOUS LIP

## 2022-02-17 ASSESSMENT — LOCATION SIMPLE DESCRIPTION DERM
LOCATION SIMPLE: RIGHT PRETIBIAL REGION
LOCATION SIMPLE: LEFT NOSE
LOCATION SIMPLE: LEFT UPPER BACK
LOCATION SIMPLE: LEFT UPPER ARM
LOCATION SIMPLE: RIGHT UPPER ARM
LOCATION SIMPLE: LEFT FOREARM
LOCATION SIMPLE: LEFT UPPER BACK
LOCATION SIMPLE: CHEST
LOCATION SIMPLE: LEFT LIP

## 2022-02-17 ASSESSMENT — LOCATION ZONE DERM
LOCATION ZONE: TRUNK
LOCATION ZONE: NOSE
LOCATION ZONE: TRUNK
LOCATION ZONE: ARM
LOCATION ZONE: LIP
LOCATION ZONE: LEG

## 2022-02-18 ENCOUNTER — APPOINTMENT (OUTPATIENT)
Dept: URBAN - METROPOLITAN AREA CLINIC 321 | Age: 82
Setting detail: DERMATOLOGY
End: 2022-02-22

## 2022-02-18 PROBLEM — C44.91 BASAL CELL CARCINOMA OF SKIN, UNSPECIFIED: Status: ACTIVE | Noted: 2022-02-18

## 2022-02-18 PROCEDURE — G6001 ECHO GUIDANCE RADIOTHERAPY: HCPCS

## 2022-02-18 PROCEDURE — 99213 OFFICE O/P EST LOW 20 MIN: CPT

## 2022-02-21 ENCOUNTER — TELEPHONE (OUTPATIENT)
Dept: INTERNAL MEDICINE CLINIC | Facility: CLINIC | Age: 82
End: 2022-02-21

## 2022-02-21 RX ORDER — ZOLPIDEM TARTRATE 10 MG/1
10 TABLET ORAL NIGHTLY PRN
Qty: 90 TABLET | Refills: 1 | Status: SHIPPED | OUTPATIENT
Start: 2022-02-21

## 2022-02-21 NOTE — TELEPHONE ENCOUNTER
Zolpidem refill request received from [facility]. This was last refilled 8/26/21 for #90 with 1 additional refill. Refill pended to Dr. Francoise Bernstein for review.

## 2022-02-24 RX ORDER — TAMSULOSIN HYDROCHLORIDE 0.4 MG/1
CAPSULE ORAL
Qty: 90 CAPSULE | Refills: 3 | Status: SHIPPED | OUTPATIENT
Start: 2022-02-24

## 2022-02-24 RX ORDER — FAMOTIDINE 20 MG/1
TABLET, FILM COATED ORAL
Qty: 180 TABLET | Refills: 3 | Status: SHIPPED | OUTPATIENT
Start: 2022-02-24

## 2022-02-24 RX ORDER — DUTASTERIDE 0.5 MG/1
CAPSULE, LIQUID FILLED ORAL
Qty: 90 CAPSULE | Refills: 3 | Status: SHIPPED | OUTPATIENT
Start: 2022-02-24

## 2022-03-07 NOTE — PROCEDURE: SUPERFICIAL RADIATION TREATMENT
Please Choose The Type Of Visit (Required): Treatment Visit: Show Treatment Variables Statement Selected

## 2022-04-05 ENCOUNTER — TELEPHONE (OUTPATIENT)
Dept: INTERNAL MEDICINE CLINIC | Facility: CLINIC | Age: 82
End: 2022-04-05

## 2022-04-05 ENCOUNTER — LAB ENCOUNTER (OUTPATIENT)
Dept: LAB | Age: 82
End: 2022-04-05
Attending: INTERNAL MEDICINE
Payer: MEDICARE

## 2022-04-05 DIAGNOSIS — E11.9 TYPE 2 DIABETES MELLITUS WITHOUT COMPLICATION, WITHOUT LONG-TERM CURRENT USE OF INSULIN (HCC): ICD-10-CM

## 2022-04-05 DIAGNOSIS — E78.1 HYPERTRIGLYCERIDEMIA: ICD-10-CM

## 2022-04-05 DIAGNOSIS — I10 ESSENTIAL HYPERTENSION: ICD-10-CM

## 2022-04-05 DIAGNOSIS — E78.00 HYPERCHOLESTEROLEMIA: ICD-10-CM

## 2022-04-05 LAB
ALT SERPL-CCNC: 28 U/L
ANION GAP SERPL CALC-SCNC: 5 MMOL/L (ref 0–18)
AST SERPL-CCNC: 27 U/L (ref 15–37)
BASOPHILS # BLD AUTO: 0.01 X10(3) UL (ref 0–0.2)
BASOPHILS NFR BLD AUTO: 0.1 %
BUN BLD-MCNC: 17 MG/DL (ref 7–18)
BUN/CREAT SERPL: 15 (ref 10–20)
CALCIUM BLD-MCNC: 9.4 MG/DL (ref 8.5–10.1)
CHLORIDE SERPL-SCNC: 106 MMOL/L (ref 98–112)
CHOLEST SERPL-MCNC: 152 MG/DL (ref ?–200)
CO2 SERPL-SCNC: 28 MMOL/L (ref 21–32)
CREAT BLD-MCNC: 1.13 MG/DL
DEPRECATED RDW RBC AUTO: 44.3 FL (ref 35.1–46.3)
EOSINOPHIL # BLD AUTO: 0.2 X10(3) UL (ref 0–0.7)
EOSINOPHIL NFR BLD AUTO: 2.7 %
ERYTHROCYTE [DISTWIDTH] IN BLOOD BY AUTOMATED COUNT: 13.7 % (ref 11–15)
EST. AVERAGE GLUCOSE BLD GHB EST-MCNC: 120 MG/DL (ref 68–126)
FASTING PATIENT LIPID ANSWER: YES
FASTING STATUS PATIENT QL REPORTED: YES
GLUCOSE BLD-MCNC: 97 MG/DL (ref 70–99)
HBA1C MFR BLD: 5.8 % (ref ?–5.7)
HCT VFR BLD AUTO: 44.9 %
HDLC SERPL-MCNC: 37 MG/DL (ref 40–59)
HGB BLD-MCNC: 14.7 G/DL
IMM GRANULOCYTES # BLD AUTO: 0.01 X10(3) UL (ref 0–1)
IMM GRANULOCYTES NFR BLD: 0.1 %
LDLC SERPL CALC-MCNC: 94 MG/DL (ref ?–100)
LYMPHOCYTES # BLD AUTO: 2.12 X10(3) UL (ref 1–4)
LYMPHOCYTES NFR BLD AUTO: 28.3 %
MCH RBC QN AUTO: 29.3 PG (ref 26–34)
MCHC RBC AUTO-ENTMCNC: 32.7 G/DL (ref 31–37)
MCV RBC AUTO: 89.4 FL
MONOCYTES # BLD AUTO: 0.76 X10(3) UL (ref 0.1–1)
MONOCYTES NFR BLD AUTO: 10.2 %
NEUTROPHILS # BLD AUTO: 4.38 X10 (3) UL (ref 1.5–7.7)
NEUTROPHILS # BLD AUTO: 4.38 X10(3) UL (ref 1.5–7.7)
NEUTROPHILS NFR BLD AUTO: 58.6 %
NONHDLC SERPL-MCNC: 115 MG/DL (ref ?–130)
OSMOLALITY SERPL CALC.SUM OF ELEC: 289 MOSM/KG (ref 275–295)
PLATELET # BLD AUTO: 174 10(3)UL (ref 150–450)
POTASSIUM SERPL-SCNC: 4.4 MMOL/L (ref 3.5–5.1)
RBC # BLD AUTO: 5.02 X10(6)UL
SODIUM SERPL-SCNC: 139 MMOL/L (ref 136–145)
TRIGL SERPL-MCNC: 116 MG/DL (ref 30–149)
VLDLC SERPL CALC-MCNC: 19 MG/DL (ref 0–30)
WBC # BLD AUTO: 7.5 X10(3) UL (ref 4–11)

## 2022-04-05 PROCEDURE — 83036 HEMOGLOBIN GLYCOSYLATED A1C: CPT

## 2022-04-05 PROCEDURE — 85025 COMPLETE CBC W/AUTO DIFF WBC: CPT

## 2022-04-05 PROCEDURE — 80061 LIPID PANEL: CPT

## 2022-04-05 PROCEDURE — 80048 BASIC METABOLIC PNL TOTAL CA: CPT

## 2022-04-05 PROCEDURE — 84450 TRANSFERASE (AST) (SGOT): CPT

## 2022-04-05 PROCEDURE — 84460 ALANINE AMINO (ALT) (SGPT): CPT

## 2022-04-05 PROCEDURE — 36415 COLL VENOUS BLD VENIPUNCTURE: CPT

## 2022-04-05 NOTE — TELEPHONE ENCOUNTER
Patient is at [facility] lab now. Needs lab orders entered. Waiting in Dr. Catalan Payment lobby for orders to be entered.

## 2022-04-05 NOTE — TELEPHONE ENCOUNTER
Per 9/23/21 ov:  I will see the patient back in 3 months with blood tests which will include a CBC, BMP, hemoglobin A1c, lipid panel, AST and ALT. I will see the patient back sooner as necessary. Orders placed per OV note.

## 2022-04-25 ENCOUNTER — OFFICE VISIT (OUTPATIENT)
Dept: INTERNAL MEDICINE CLINIC | Facility: CLINIC | Age: 82
End: 2022-04-25
Payer: MEDICARE

## 2022-04-25 VITALS
BODY MASS INDEX: 28.36 KG/M2 | WEIGHT: 185 LBS | HEART RATE: 64 BPM | HEIGHT: 67.6 IN | DIASTOLIC BLOOD PRESSURE: 76 MMHG | OXYGEN SATURATION: 97 % | SYSTOLIC BLOOD PRESSURE: 136 MMHG | TEMPERATURE: 97 F

## 2022-04-25 DIAGNOSIS — E11.9 TYPE 2 DIABETES MELLITUS WITHOUT COMPLICATION, WITHOUT LONG-TERM CURRENT USE OF INSULIN (HCC): ICD-10-CM

## 2022-04-25 DIAGNOSIS — N40.1 BENIGN NON-NODULAR PROSTATIC HYPERPLASIA WITH LOWER URINARY TRACT SYMPTOMS: ICD-10-CM

## 2022-04-25 DIAGNOSIS — D69.6 THROMBOCYTOPENIA (HCC): ICD-10-CM

## 2022-04-25 DIAGNOSIS — K58.9 IRRITABLE BOWEL SYNDROME WITHOUT DIARRHEA: ICD-10-CM

## 2022-04-25 DIAGNOSIS — M48.061 SPINAL STENOSIS OF LUMBAR REGION, UNSPECIFIED WHETHER NEUROGENIC CLAUDICATION PRESENT: ICD-10-CM

## 2022-04-25 DIAGNOSIS — I25.10 ASHD (ARTERIOSCLEROTIC HEART DISEASE): ICD-10-CM

## 2022-04-25 DIAGNOSIS — R53.83 FATIGUE, UNSPECIFIED TYPE: ICD-10-CM

## 2022-04-25 DIAGNOSIS — I35.0 AORTIC VALVE STENOSIS, ETIOLOGY OF CARDIAC VALVE DISEASE UNSPECIFIED: ICD-10-CM

## 2022-04-25 DIAGNOSIS — I10 ESSENTIAL HYPERTENSION: Primary | ICD-10-CM

## 2022-04-25 DIAGNOSIS — N18.31 STAGE 3A CHRONIC KIDNEY DISEASE (HCC): ICD-10-CM

## 2022-04-25 DIAGNOSIS — E78.1 HYPERTRIGLYCERIDEMIA: ICD-10-CM

## 2022-04-25 DIAGNOSIS — I34.0 MITRAL VALVE INSUFFICIENCY, UNSPECIFIED ETIOLOGY: ICD-10-CM

## 2022-04-25 DIAGNOSIS — K21.9 GASTROESOPHAGEAL REFLUX DISEASE, UNSPECIFIED WHETHER ESOPHAGITIS PRESENT: ICD-10-CM

## 2022-04-25 DIAGNOSIS — G62.9 PERIPHERAL POLYNEUROPATHY: ICD-10-CM

## 2022-04-25 DIAGNOSIS — E78.00 HYPERCHOLESTEROLEMIA: ICD-10-CM

## 2022-04-25 DIAGNOSIS — M15.9 PRIMARY OSTEOARTHRITIS INVOLVING MULTIPLE JOINTS: ICD-10-CM

## 2022-04-25 PROCEDURE — 3075F SYST BP GE 130 - 139MM HG: CPT | Performed by: INTERNAL MEDICINE

## 2022-04-25 PROCEDURE — 3008F BODY MASS INDEX DOCD: CPT | Performed by: INTERNAL MEDICINE

## 2022-04-25 PROCEDURE — 99214 OFFICE O/P EST MOD 30 MIN: CPT | Performed by: INTERNAL MEDICINE

## 2022-04-25 PROCEDURE — 3078F DIAST BP <80 MM HG: CPT | Performed by: INTERNAL MEDICINE

## 2022-05-16 ENCOUNTER — TELEPHONE (OUTPATIENT)
Dept: INTERNAL MEDICINE CLINIC | Facility: CLINIC | Age: 82
End: 2022-05-16

## 2022-05-16 RX ORDER — GLIMEPIRIDE 2 MG/1
TABLET ORAL
Qty: 90 TABLET | Refills: 0 | Status: SHIPPED | OUTPATIENT
Start: 2022-05-16

## 2022-05-17 RX ORDER — METOPROLOL SUCCINATE 25 MG/1
TABLET, EXTENDED RELEASE ORAL
Qty: 90 TABLET | Refills: 3 | Status: SHIPPED | OUTPATIENT
Start: 2022-05-17

## 2022-05-17 NOTE — TELEPHONE ENCOUNTER
Dr. Rachel Abdi please advise on metoprolol as your last OV note on 4/25/2022 does not have it list as a medication to continue on med list.

## 2022-05-17 NOTE — TELEPHONE ENCOUNTER
NOted.  Pt has been on Metoprolol for some time. I have reviewed his Metoprolol ER today.   Thank you!!

## 2022-05-20 RX ORDER — LOSARTAN POTASSIUM 100 MG/1
TABLET ORAL
Qty: 90 TABLET | Refills: 3 | Status: SHIPPED | OUTPATIENT
Start: 2022-05-20

## 2022-07-28 ENCOUNTER — LAB ENCOUNTER (OUTPATIENT)
Dept: LAB | Age: 82
End: 2022-07-28
Attending: INTERNAL MEDICINE
Payer: MEDICARE

## 2022-07-28 DIAGNOSIS — R53.83 FATIGUE, UNSPECIFIED TYPE: ICD-10-CM

## 2022-07-28 DIAGNOSIS — E78.00 HYPERCHOLESTEROLEMIA: ICD-10-CM

## 2022-07-28 DIAGNOSIS — E78.1 HYPERTRIGLYCERIDEMIA: ICD-10-CM

## 2022-07-28 DIAGNOSIS — D69.6 THROMBOCYTOPENIA (HCC): ICD-10-CM

## 2022-07-28 DIAGNOSIS — E11.9 TYPE 2 DIABETES MELLITUS WITHOUT COMPLICATION, WITHOUT LONG-TERM CURRENT USE OF INSULIN (HCC): ICD-10-CM

## 2022-07-28 LAB
ALT SERPL-CCNC: 30 U/L
ANION GAP SERPL CALC-SCNC: 10 MMOL/L (ref 0–18)
AST SERPL-CCNC: 25 U/L (ref 15–37)
BASOPHILS # BLD AUTO: 0 X10(3) UL (ref 0–0.2)
BASOPHILS NFR BLD AUTO: 0 %
BUN BLD-MCNC: 19 MG/DL (ref 7–18)
BUN/CREAT SERPL: 16.5 (ref 10–20)
CALCIUM BLD-MCNC: 9.5 MG/DL (ref 8.5–10.1)
CHLORIDE SERPL-SCNC: 108 MMOL/L (ref 98–112)
CHOLEST SERPL-MCNC: 109 MG/DL (ref ?–200)
CO2 SERPL-SCNC: 24 MMOL/L (ref 21–32)
CREAT BLD-MCNC: 1.15 MG/DL
DEPRECATED RDW RBC AUTO: 44.8 FL (ref 35.1–46.3)
EOSINOPHIL # BLD AUTO: 0.21 X10(3) UL (ref 0–0.7)
EOSINOPHIL NFR BLD AUTO: 3.2 %
ERYTHROCYTE [DISTWIDTH] IN BLOOD BY AUTOMATED COUNT: 13.5 % (ref 11–15)
EST. AVERAGE GLUCOSE BLD GHB EST-MCNC: 117 MG/DL (ref 68–126)
FASTING PATIENT LIPID ANSWER: YES
FASTING STATUS PATIENT QL REPORTED: YES
GLUCOSE BLD-MCNC: 86 MG/DL (ref 70–99)
HBA1C MFR BLD: 5.7 % (ref ?–5.7)
HCT VFR BLD AUTO: 43.7 %
HDLC SERPL-MCNC: 30 MG/DL (ref 40–59)
HGB BLD-MCNC: 14.2 G/DL
IMM GRANULOCYTES # BLD AUTO: 0.01 X10(3) UL (ref 0–1)
IMM GRANULOCYTES NFR BLD: 0.2 %
LDLC SERPL CALC-MCNC: 62 MG/DL (ref ?–100)
LYMPHOCYTES # BLD AUTO: 2.26 X10(3) UL (ref 1–4)
LYMPHOCYTES NFR BLD AUTO: 34.9 %
MCH RBC QN AUTO: 29.4 PG (ref 26–34)
MCHC RBC AUTO-ENTMCNC: 32.5 G/DL (ref 31–37)
MCV RBC AUTO: 90.5 FL
MONOCYTES # BLD AUTO: 0.69 X10(3) UL (ref 0.1–1)
MONOCYTES NFR BLD AUTO: 10.6 %
NEUTROPHILS # BLD AUTO: 3.31 X10 (3) UL (ref 1.5–7.7)
NEUTROPHILS # BLD AUTO: 3.31 X10(3) UL (ref 1.5–7.7)
NEUTROPHILS NFR BLD AUTO: 51.1 %
NONHDLC SERPL-MCNC: 79 MG/DL (ref ?–130)
OSMOLALITY SERPL CALC.SUM OF ELEC: 296 MOSM/KG (ref 275–295)
PLATELET # BLD AUTO: 169 10(3)UL (ref 150–450)
POTASSIUM SERPL-SCNC: 4.1 MMOL/L (ref 3.5–5.1)
RBC # BLD AUTO: 4.83 X10(6)UL
SODIUM SERPL-SCNC: 142 MMOL/L (ref 136–145)
TRIGL SERPL-MCNC: 86 MG/DL (ref 30–149)
VLDLC SERPL CALC-MCNC: 13 MG/DL (ref 0–30)
WBC # BLD AUTO: 6.5 X10(3) UL (ref 4–11)

## 2022-07-28 PROCEDURE — 80048 BASIC METABOLIC PNL TOTAL CA: CPT

## 2022-07-28 PROCEDURE — 85025 COMPLETE CBC W/AUTO DIFF WBC: CPT

## 2022-07-28 PROCEDURE — 84450 TRANSFERASE (AST) (SGOT): CPT

## 2022-07-28 PROCEDURE — 80061 LIPID PANEL: CPT

## 2022-07-28 PROCEDURE — 84460 ALANINE AMINO (ALT) (SGPT): CPT

## 2022-07-28 PROCEDURE — 36415 COLL VENOUS BLD VENIPUNCTURE: CPT

## 2022-07-28 PROCEDURE — 83036 HEMOGLOBIN GLYCOSYLATED A1C: CPT

## 2022-08-03 ENCOUNTER — OFFICE VISIT (OUTPATIENT)
Dept: INTERNAL MEDICINE CLINIC | Facility: CLINIC | Age: 82
End: 2022-08-03
Payer: MEDICARE

## 2022-08-03 VITALS
WEIGHT: 182.38 LBS | HEART RATE: 56 BPM | DIASTOLIC BLOOD PRESSURE: 60 MMHG | OXYGEN SATURATION: 98 % | SYSTOLIC BLOOD PRESSURE: 140 MMHG | HEIGHT: 67 IN | BODY MASS INDEX: 28.63 KG/M2 | TEMPERATURE: 98 F

## 2022-08-03 DIAGNOSIS — N40.1 BENIGN NON-NODULAR PROSTATIC HYPERPLASIA WITH LOWER URINARY TRACT SYMPTOMS: ICD-10-CM

## 2022-08-03 DIAGNOSIS — K58.9 IRRITABLE BOWEL SYNDROME WITHOUT DIARRHEA: ICD-10-CM

## 2022-08-03 DIAGNOSIS — E78.1 HYPERTRIGLYCERIDEMIA: ICD-10-CM

## 2022-08-03 DIAGNOSIS — E11.9 TYPE 2 DIABETES MELLITUS WITHOUT COMPLICATION, WITHOUT LONG-TERM CURRENT USE OF INSULIN (HCC): ICD-10-CM

## 2022-08-03 DIAGNOSIS — N18.31 STAGE 3A CHRONIC KIDNEY DISEASE (HCC): ICD-10-CM

## 2022-08-03 DIAGNOSIS — I25.10 ASHD (ARTERIOSCLEROTIC HEART DISEASE): ICD-10-CM

## 2022-08-03 DIAGNOSIS — E78.00 HYPERCHOLESTEROLEMIA: ICD-10-CM

## 2022-08-03 DIAGNOSIS — I10 ESSENTIAL HYPERTENSION: ICD-10-CM

## 2022-08-03 DIAGNOSIS — R53.83 FATIGUE, UNSPECIFIED TYPE: ICD-10-CM

## 2022-08-03 DIAGNOSIS — I34.0 MITRAL VALVE INSUFFICIENCY, UNSPECIFIED ETIOLOGY: ICD-10-CM

## 2022-08-03 DIAGNOSIS — I35.0 AORTIC VALVE STENOSIS, ETIOLOGY OF CARDIAC VALVE DISEASE UNSPECIFIED: ICD-10-CM

## 2022-08-03 DIAGNOSIS — R94.31 ABNORMAL EKG: ICD-10-CM

## 2022-08-03 DIAGNOSIS — M15.9 PRIMARY OSTEOARTHRITIS INVOLVING MULTIPLE JOINTS: ICD-10-CM

## 2022-08-03 DIAGNOSIS — K21.9 GASTROESOPHAGEAL REFLUX DISEASE, UNSPECIFIED WHETHER ESOPHAGITIS PRESENT: ICD-10-CM

## 2022-08-03 DIAGNOSIS — M48.061 SPINAL STENOSIS OF LUMBAR REGION, UNSPECIFIED WHETHER NEUROGENIC CLAUDICATION PRESENT: ICD-10-CM

## 2022-08-03 DIAGNOSIS — Z00.00 ANNUAL PHYSICAL EXAM: Primary | ICD-10-CM

## 2022-08-03 DIAGNOSIS — D69.6 THROMBOCYTOPENIA (HCC): ICD-10-CM

## 2022-08-03 DIAGNOSIS — G62.9 PERIPHERAL POLYNEUROPATHY: ICD-10-CM

## 2022-08-03 LAB
OCCULT BLOOD, STOOL 1: NEGATIVE
PERFORMANCE MONITORS CORRECT (YES/NO): YES YES/NO

## 2022-08-03 PROCEDURE — 3078F DIAST BP <80 MM HG: CPT | Performed by: INTERNAL MEDICINE

## 2022-08-03 PROCEDURE — 93000 ELECTROCARDIOGRAM COMPLETE: CPT | Performed by: INTERNAL MEDICINE

## 2022-08-03 PROCEDURE — G0439 PPPS, SUBSEQ VISIT: HCPCS | Performed by: INTERNAL MEDICINE

## 2022-08-03 PROCEDURE — 96160 PT-FOCUSED HLTH RISK ASSMT: CPT | Performed by: INTERNAL MEDICINE

## 2022-08-03 PROCEDURE — 99397 PER PM REEVAL EST PAT 65+ YR: CPT | Performed by: INTERNAL MEDICINE

## 2022-08-03 PROCEDURE — 1125F AMNT PAIN NOTED PAIN PRSNT: CPT | Performed by: INTERNAL MEDICINE

## 2022-08-03 PROCEDURE — 3077F SYST BP >= 140 MM HG: CPT | Performed by: INTERNAL MEDICINE

## 2022-08-03 PROCEDURE — 3008F BODY MASS INDEX DOCD: CPT | Performed by: INTERNAL MEDICINE

## 2022-08-03 PROCEDURE — 82272 OCCULT BLD FECES 1-3 TESTS: CPT | Performed by: INTERNAL MEDICINE

## 2022-08-03 NOTE — PATIENT INSTRUCTIONS
1.  Patient is to continue his current diet, medication and activity. 2.  Patient has received his 2 COVID booster vaccines. 3.  I will plan to see the patient back in 3 months with blood tests and urinalysis as ordered. 4.  I will see the patient back sooner as necessary.

## 2022-08-17 RX ORDER — GLIMEPIRIDE 2 MG/1
TABLET ORAL
Qty: 90 TABLET | Refills: 3 | Status: SHIPPED | OUTPATIENT
Start: 2022-08-17

## 2022-08-17 RX ORDER — CELECOXIB 200 MG/1
CAPSULE ORAL
Qty: 90 CAPSULE | Refills: 3 | Status: SHIPPED | OUTPATIENT
Start: 2022-08-17

## 2022-08-19 ENCOUNTER — TELEPHONE (OUTPATIENT)
Dept: INTERNAL MEDICINE CLINIC | Facility: CLINIC | Age: 82
End: 2022-08-19

## 2022-08-22 NOTE — TELEPHONE ENCOUNTER
To MD:  The above refill request is for a controlled substance. Please review pended medication order. Print and sign for staff to fax to pharmacy or prescribe electronically.     Last office visit:  8/2022  Last time refill sent and quantity/refills:  5/23/22

## 2022-08-23 ENCOUNTER — TELEPHONE (OUTPATIENT)
Dept: INTERNAL MEDICINE CLINIC | Facility: CLINIC | Age: 82
End: 2022-08-23

## 2022-08-23 RX ORDER — ZOLPIDEM TARTRATE 10 MG/1
10 TABLET ORAL NIGHTLY PRN
Qty: 30 TABLET | Refills: 0 | Status: SHIPPED | OUTPATIENT
Start: 2022-08-23

## 2022-08-23 NOTE — TELEPHONE ENCOUNTER
Pt is calling and states that he needs a refill for the following:    ZOLPIDEM 10MG    Please send to [facility] states he is completely out of the medication

## 2022-08-23 NOTE — TELEPHONE ENCOUNTER
To MD:  The above refill request is for a controlled substance. Please review pended medication order. Print and sign for staff to fax to pharmacy or prescribe electronically. Last office visit:8/3/22  Last time refill sent and quantity/refills:2/21/22 # 90 1 refill  Patient is not taking it PRN , he is taking it nightly   To DR. Eboni Owens

## 2022-09-01 RX ORDER — ZOLPIDEM TARTRATE 10 MG/1
TABLET ORAL
Qty: 90 TABLET | Refills: 1 | OUTPATIENT
Start: 2022-09-01

## 2022-09-20 ENCOUNTER — TELEPHONE (OUTPATIENT)
Dept: INTERNAL MEDICINE CLINIC | Facility: CLINIC | Age: 82
End: 2022-09-20

## 2022-09-20 NOTE — TELEPHONE ENCOUNTER
To MD:  The above refill request is for a controlled substance. Please review pended medication order. Print and sign for staff to fax to pharmacy or prescribe electronically. Last office visit: 8/3/22  Last time refill sent and quantity/refills: #30 with 0 on 8/23/22  Per [place]  last dispensed 8/23/22      TO Dr. Jael Blankenship to please advise on refill in MD absence, thanks!

## 2022-09-21 RX ORDER — AMLODIPINE BESYLATE 10 MG/1
TABLET ORAL
Qty: 90 TABLET | Refills: 3 | Status: SHIPPED | OUTPATIENT
Start: 2022-09-21

## 2022-09-21 RX ORDER — ZOLPIDEM TARTRATE 10 MG/1
10 TABLET ORAL NIGHTLY PRN
Qty: 30 TABLET | Refills: 0 | Status: SHIPPED | OUTPATIENT
Start: 2022-09-21

## 2022-09-21 RX ORDER — ZOLPIDEM TARTRATE 10 MG/1
10 TABLET ORAL NIGHTLY PRN
Qty: 30 TABLET | Refills: 0 | Status: SHIPPED | OUTPATIENT
Start: 2022-09-21 | End: 2022-09-21

## 2022-09-27 ENCOUNTER — LAB ENCOUNTER (OUTPATIENT)
Dept: LAB | Age: 82
End: 2022-09-27
Attending: INTERNAL MEDICINE
Payer: MEDICARE

## 2022-09-27 DIAGNOSIS — E11.9 TYPE 2 DIABETES MELLITUS WITHOUT COMPLICATION, WITHOUT LONG-TERM CURRENT USE OF INSULIN (HCC): ICD-10-CM

## 2022-09-27 DIAGNOSIS — Z00.00 ANNUAL PHYSICAL EXAM: ICD-10-CM

## 2022-09-27 DIAGNOSIS — R53.83 FATIGUE, UNSPECIFIED TYPE: ICD-10-CM

## 2022-09-27 DIAGNOSIS — N18.31 STAGE 3A CHRONIC KIDNEY DISEASE (HCC): ICD-10-CM

## 2022-09-27 DIAGNOSIS — E78.00 HYPERCHOLESTEROLEMIA: ICD-10-CM

## 2022-09-27 DIAGNOSIS — E78.1 HYPERTRIGLYCERIDEMIA: ICD-10-CM

## 2022-09-27 LAB
ALBUMIN SERPL-MCNC: 3.9 G/DL (ref 3.4–5)
ALBUMIN/GLOB SERPL: 1.4 {RATIO} (ref 1–2)
ALP LIVER SERPL-CCNC: 65 U/L
ALT SERPL-CCNC: 34 U/L
ANION GAP SERPL CALC-SCNC: 8 MMOL/L (ref 0–18)
AST SERPL-CCNC: 25 U/L (ref 15–37)
BASOPHILS # BLD AUTO: 0.01 X10(3) UL (ref 0–0.2)
BASOPHILS NFR BLD AUTO: 0.1 %
BILIRUB SERPL-MCNC: 0.5 MG/DL (ref 0.1–2)
BILIRUB UR QL: NEGATIVE
BUN BLD-MCNC: 17 MG/DL (ref 7–18)
BUN/CREAT SERPL: 17 (ref 10–20)
CALCIUM BLD-MCNC: 9.3 MG/DL (ref 8.5–10.1)
CHLORIDE SERPL-SCNC: 106 MMOL/L (ref 98–112)
CHOLEST SERPL-MCNC: 112 MG/DL (ref ?–200)
CLARITY UR: CLEAR
CO2 SERPL-SCNC: 27 MMOL/L (ref 21–32)
COLOR UR: YELLOW
CREAT BLD-MCNC: 1 MG/DL
DEPRECATED RDW RBC AUTO: 46.1 FL (ref 35.1–46.3)
EOSINOPHIL # BLD AUTO: 0.29 X10(3) UL (ref 0–0.7)
EOSINOPHIL NFR BLD AUTO: 3.9 %
ERYTHROCYTE [DISTWIDTH] IN BLOOD BY AUTOMATED COUNT: 13.7 % (ref 11–15)
EST. AVERAGE GLUCOSE BLD GHB EST-MCNC: 111 MG/DL (ref 68–126)
FASTING PATIENT LIPID ANSWER: YES
FASTING STATUS PATIENT QL REPORTED: YES
GFR SERPLBLD BASED ON 1.73 SQ M-ARVRAT: 75 ML/MIN/1.73M2 (ref 60–?)
GLOBULIN PLAS-MCNC: 2.7 G/DL (ref 2.8–4.4)
GLUCOSE BLD-MCNC: 95 MG/DL (ref 70–99)
GLUCOSE UR-MCNC: NEGATIVE MG/DL
HBA1C MFR BLD: 5.5 % (ref ?–5.7)
HCT VFR BLD AUTO: 42.8 %
HDLC SERPL-MCNC: 33 MG/DL (ref 40–59)
HGB BLD-MCNC: 13.9 G/DL
IMM GRANULOCYTES # BLD AUTO: 0.04 X10(3) UL (ref 0–1)
IMM GRANULOCYTES NFR BLD: 0.5 %
KETONES UR-MCNC: NEGATIVE MG/DL
LDLC SERPL CALC-MCNC: 62 MG/DL (ref ?–100)
LEUKOCYTE ESTERASE UR QL STRIP.AUTO: NEGATIVE
LYMPHOCYTES # BLD AUTO: 2.52 X10(3) UL (ref 1–4)
LYMPHOCYTES NFR BLD AUTO: 34.1 %
MCH RBC QN AUTO: 29.7 PG (ref 26–34)
MCHC RBC AUTO-ENTMCNC: 32.5 G/DL (ref 31–37)
MCV RBC AUTO: 91.5 FL
MONOCYTES # BLD AUTO: 0.68 X10(3) UL (ref 0.1–1)
MONOCYTES NFR BLD AUTO: 9.2 %
NEUTROPHILS # BLD AUTO: 3.85 X10 (3) UL (ref 1.5–7.7)
NEUTROPHILS # BLD AUTO: 3.85 X10(3) UL (ref 1.5–7.7)
NEUTROPHILS NFR BLD AUTO: 52.2 %
NITRITE UR QL STRIP.AUTO: NEGATIVE
NONHDLC SERPL-MCNC: 79 MG/DL (ref ?–130)
OSMOLALITY SERPL CALC.SUM OF ELEC: 293 MOSM/KG (ref 275–295)
PH UR: 6.5 [PH] (ref 5–8)
PLATELET # BLD AUTO: 122 10(3)UL (ref 150–450)
POTASSIUM SERPL-SCNC: 4.1 MMOL/L (ref 3.5–5.1)
PROT SERPL-MCNC: 6.6 G/DL (ref 6.4–8.2)
PROT UR-MCNC: NEGATIVE MG/DL
RBC # BLD AUTO: 4.68 X10(6)UL
SODIUM SERPL-SCNC: 141 MMOL/L (ref 136–145)
SP GR UR STRIP: 1.02 (ref 1–1.03)
TRIGL SERPL-MCNC: 85 MG/DL (ref 30–149)
TSI SER-ACNC: 1.81 MIU/ML (ref 0.36–3.74)
UROBILINOGEN UR STRIP-ACNC: 0.2
VLDLC SERPL CALC-MCNC: 13 MG/DL (ref 0–30)
WBC # BLD AUTO: 7.4 X10(3) UL (ref 4–11)

## 2022-09-27 PROCEDURE — 81015 MICROSCOPIC EXAM OF URINE: CPT | Performed by: INTERNAL MEDICINE

## 2022-09-27 PROCEDURE — 80053 COMPREHEN METABOLIC PANEL: CPT

## 2022-09-27 PROCEDURE — 84443 ASSAY THYROID STIM HORMONE: CPT

## 2022-09-27 PROCEDURE — 81001 URINALYSIS AUTO W/SCOPE: CPT | Performed by: INTERNAL MEDICINE

## 2022-09-27 PROCEDURE — 85025 COMPLETE CBC W/AUTO DIFF WBC: CPT

## 2022-09-27 PROCEDURE — 83036 HEMOGLOBIN GLYCOSYLATED A1C: CPT

## 2022-09-27 PROCEDURE — 80061 LIPID PANEL: CPT

## 2022-09-27 PROCEDURE — 36415 COLL VENOUS BLD VENIPUNCTURE: CPT

## 2022-10-20 ENCOUNTER — TELEPHONE (OUTPATIENT)
Dept: INTERNAL MEDICINE CLINIC | Facility: CLINIC | Age: 82
End: 2022-10-20

## 2022-10-20 RX ORDER — ZOLPIDEM TARTRATE 10 MG/1
TABLET ORAL
Qty: 30 TABLET | Refills: 0 | Status: SHIPPED | OUTPATIENT
Start: 2022-10-20

## 2022-10-20 NOTE — TELEPHONE ENCOUNTER
To MD:  The above refill request is for a controlled substance. Please review pended medication order. Print and sign for staff to fax to pharmacy or prescribe electronically.     Last office visit: 8/3/22  Last time refill sent and quantity/refills: 9/21/22 qty 30 plus 0

## 2022-11-13 RX ORDER — DULOXETIN HYDROCHLORIDE 30 MG/1
CAPSULE, DELAYED RELEASE ORAL
Qty: 90 CAPSULE | Refills: 3 | OUTPATIENT
Start: 2022-11-13

## 2022-11-15 ENCOUNTER — TELEPHONE (OUTPATIENT)
Dept: INTERNAL MEDICINE CLINIC | Facility: CLINIC | Age: 82
End: 2022-11-15

## 2022-11-15 RX ORDER — DULOXETIN HYDROCHLORIDE 30 MG/1
CAPSULE, DELAYED RELEASE ORAL
Qty: 90 CAPSULE | Refills: 3 | OUTPATIENT
Start: 2022-11-15

## 2022-11-15 NOTE — TELEPHONE ENCOUNTER
To Dr. Nadia Izquierdo-- can you please advise on lab results from 9/27/22 for patient in Dr. Fidelia Pool extended absence? Thanks!

## 2022-11-16 NOTE — TELEPHONE ENCOUNTER
Labs look okay other than slightly low platelet count though has been low in the past and overall stable

## 2022-11-18 ENCOUNTER — TELEPHONE (OUTPATIENT)
Dept: INTERNAL MEDICINE CLINIC | Facility: CLINIC | Age: 82
End: 2022-11-18

## 2022-11-21 RX ORDER — ZOLPIDEM TARTRATE 10 MG/1
TABLET ORAL
Qty: 30 TABLET | Refills: 0 | Status: SHIPPED | OUTPATIENT
Start: 2022-11-21

## 2022-11-21 NOTE — TELEPHONE ENCOUNTER
To MD:  The above refill request is for a controlled substance. Please review pended medication order. Print and sign for staff to fax to pharmacy or prescribe electronically.     Last office visit: 8/3/22  Last time refill sent and quantity/refills: 10/20/22 #30/0  [place] : 10/20/22 #30    To Dr. Claudine Bray, please advise on refill in Dr. Yosvany Galdamez absence

## 2022-11-23 RX ORDER — DULOXETIN HYDROCHLORIDE 30 MG/1
CAPSULE, DELAYED RELEASE ORAL
Qty: 90 CAPSULE | Refills: 3 | Status: SHIPPED | OUTPATIENT
Start: 2022-11-23

## 2022-12-02 RX ORDER — DONEPEZIL HYDROCHLORIDE 5 MG/1
5 TABLET, FILM COATED ORAL NIGHTLY
Qty: 90 TABLET | Refills: 3 | Status: SHIPPED | OUTPATIENT
Start: 2022-12-02

## 2022-12-05 RX ORDER — IPRATROPIUM BROMIDE 42 UG/1
SPRAY, METERED NASAL
Qty: 30 ML | Refills: 3 | Status: SHIPPED | OUTPATIENT
Start: 2022-12-05

## 2022-12-08 ENCOUNTER — OFFICE VISIT (OUTPATIENT)
Dept: OPHTHALMOLOGY | Facility: CLINIC | Age: 82
End: 2022-12-08
Payer: MEDICARE

## 2022-12-08 DIAGNOSIS — E11.9 DIABETES MELLITUS TYPE 2 WITHOUT RETINOPATHY (HCC): Primary | ICD-10-CM

## 2022-12-08 DIAGNOSIS — H43.393 VITREOUS FLOATERS OF BOTH EYES: ICD-10-CM

## 2022-12-08 DIAGNOSIS — H25.13 AGE-RELATED NUCLEAR CATARACT OF BOTH EYES: ICD-10-CM

## 2022-12-08 PROCEDURE — 92014 COMPRE OPH EXAM EST PT 1/>: CPT | Performed by: OPHTHALMOLOGY

## 2022-12-08 PROCEDURE — 1126F AMNT PAIN NOTED NONE PRSNT: CPT | Performed by: OPHTHALMOLOGY

## 2022-12-08 PROCEDURE — 92015 DETERMINE REFRACTIVE STATE: CPT | Performed by: OPHTHALMOLOGY

## 2022-12-08 NOTE — PATIENT INSTRUCTIONS
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 Once he calls us- we will send his information. Floater, vitreous   There is no evidence of retinal pathology. All signs and symptoms of retinal detachment/tears explained in detail. Patient instructed to call the office if they experience increase in floaters, increase in flashes of light, loss of vision or curtain or veil effect. Diabetes mellitus type 2 without retinopathy ([address]  Diabetes type II: no background of retinopathy, no signs of neovascularization noted. Discussed ocular and systemic benefits of blood sugar control. Diagnosis and treatment discussed in detail with patient.

## 2022-12-08 NOTE — ASSESSMENT & PLAN NOTE
Patient will discuss this at home with his wife and call us to let us know if he wants to be referred for bilateral cataract surgery L >R. Once he calls us- we will send his information.

## 2022-12-13 ENCOUNTER — TELEPHONE (OUTPATIENT)
Dept: OPHTHALMOLOGY | Facility: CLINIC | Age: 82
End: 2022-12-13

## 2022-12-13 NOTE — TELEPHONE ENCOUNTER
I will fax over pts information over to [phone] and they will call him to set up a consultation.

## 2022-12-19 ENCOUNTER — TELEPHONE (OUTPATIENT)
Dept: INTERNAL MEDICINE CLINIC | Facility: CLINIC | Age: 82
End: 2022-12-19

## 2022-12-19 RX ORDER — ZOLPIDEM TARTRATE 10 MG/1
TABLET ORAL
Qty: 30 TABLET | Refills: 0 | Status: SHIPPED | OUTPATIENT
Start: 2022-12-19

## 2022-12-19 NOTE — TELEPHONE ENCOUNTER
To MD:  The above refill request is for a controlled substance. Please review pended medication order. Print and sign for staff to fax to pharmacy or prescribe electronically. Last office visit: 8/3/22  Last time refill sent and quantity/refills: 11/21/22 #30 with 0   Per [place]  last dispensed 11/21/22. TO Dr. Fatou Post to please advise on refill in Dr. Cristian sandoval, thanks!

## 2023-01-19 ENCOUNTER — TELEPHONE (OUTPATIENT)
Dept: INTERNAL MEDICINE CLINIC | Facility: CLINIC | Age: 83
End: 2023-01-19

## 2023-01-19 RX ORDER — ZOLPIDEM TARTRATE 10 MG/1
TABLET ORAL
Qty: 30 TABLET | Refills: 0 | Status: SHIPPED | OUTPATIENT
Start: 2023-01-19

## 2023-01-19 NOTE — TELEPHONE ENCOUNTER
To MD:  The above refill request is for a controlled substance. Please review pended medication order. Print and sign for staff to fax to pharmacy or prescribe electronically.     Last office visit: 8/3/22  Last time refill sent and quantity/refills: 12/19/22 qty 30 plus 0

## 2023-02-16 ENCOUNTER — TELEPHONE (OUTPATIENT)
Dept: INTERNAL MEDICINE CLINIC | Facility: CLINIC | Age: 83
End: 2023-02-16

## 2023-02-16 RX ORDER — ZOLPIDEM TARTRATE 10 MG/1
10 TABLET ORAL NIGHTLY PRN
Qty: 90 TABLET | Refills: 1 | Status: SHIPPED | OUTPATIENT
Start: 2023-02-16

## 2023-02-16 NOTE — TELEPHONE ENCOUNTER
To MD:  The above refill request is for a controlled substance. Please review pended medication order. Print and sign for staff to fax to pharmacy or prescribe electronically.     Last office visit: 8/3/22  Last time refill sent and quantity/refills: 1/19/23 #30 with 0   Per [place]  last dispensed 1/19/23

## 2023-02-24 RX ORDER — TAMSULOSIN HYDROCHLORIDE 0.4 MG/1
CAPSULE ORAL
Qty: 90 CAPSULE | Refills: 3 | Status: SHIPPED | OUTPATIENT
Start: 2023-02-24

## 2023-03-03 ENCOUNTER — LAB ENCOUNTER (OUTPATIENT)
Dept: LAB | Age: 83
End: 2023-03-03
Attending: INTERNAL MEDICINE
Payer: MEDICARE

## 2023-03-03 ENCOUNTER — TELEPHONE (OUTPATIENT)
Dept: INTERNAL MEDICINE CLINIC | Facility: CLINIC | Age: 83
End: 2023-03-03

## 2023-03-03 DIAGNOSIS — R53.83 FATIGUE, UNSPECIFIED TYPE: ICD-10-CM

## 2023-03-03 DIAGNOSIS — I10 ESSENTIAL HYPERTENSION: ICD-10-CM

## 2023-03-03 DIAGNOSIS — E78.00 HYPERCHOLESTEROLEMIA: ICD-10-CM

## 2023-03-03 DIAGNOSIS — E78.1 HYPERTRIGLYCERIDEMIA: ICD-10-CM

## 2023-03-03 DIAGNOSIS — Z00.00 ANNUAL PHYSICAL EXAM: ICD-10-CM

## 2023-03-03 DIAGNOSIS — Z00.00 ANNUAL PHYSICAL EXAM: Primary | ICD-10-CM

## 2023-03-03 DIAGNOSIS — E11.9 TYPE 2 DIABETES MELLITUS WITHOUT COMPLICATION, WITHOUT LONG-TERM CURRENT USE OF INSULIN (HCC): ICD-10-CM

## 2023-03-03 LAB
ALBUMIN SERPL-MCNC: 4 G/DL (ref 3.4–5)
ALBUMIN/GLOB SERPL: 1.4 {RATIO} (ref 1–2)
ALP LIVER SERPL-CCNC: 63 U/L
ALT SERPL-CCNC: 27 U/L
ANION GAP SERPL CALC-SCNC: 5 MMOL/L (ref 0–18)
AST SERPL-CCNC: 23 U/L (ref 15–37)
BASOPHILS # BLD AUTO: 0.01 X10(3) UL (ref 0–0.2)
BASOPHILS NFR BLD AUTO: 0.1 %
BILIRUB SERPL-MCNC: 0.7 MG/DL (ref 0.1–2)
BILIRUB UR QL: NEGATIVE
BUN BLD-MCNC: 16 MG/DL (ref 7–18)
BUN/CREAT SERPL: 14.3 (ref 10–20)
CALCIUM BLD-MCNC: 9.6 MG/DL (ref 8.5–10.1)
CHLORIDE SERPL-SCNC: 108 MMOL/L (ref 98–112)
CHOLEST SERPL-MCNC: 112 MG/DL (ref ?–200)
CLARITY UR: CLEAR
CO2 SERPL-SCNC: 27 MMOL/L (ref 21–32)
COLOR UR: YELLOW
CREAT BLD-MCNC: 1.12 MG/DL
CREAT UR-SCNC: 188 MG/DL
DEPRECATED RDW RBC AUTO: 42.1 FL (ref 35.1–46.3)
EOSINOPHIL # BLD AUTO: 0.21 X10(3) UL (ref 0–0.7)
EOSINOPHIL NFR BLD AUTO: 2.8 %
ERYTHROCYTE [DISTWIDTH] IN BLOOD BY AUTOMATED COUNT: 13.1 % (ref 11–15)
EST. AVERAGE GLUCOSE BLD GHB EST-MCNC: 117 MG/DL (ref 68–126)
FASTING PATIENT LIPID ANSWER: YES
FASTING STATUS PATIENT QL REPORTED: YES
GFR SERPLBLD BASED ON 1.73 SQ M-ARVRAT: 65 ML/MIN/1.73M2 (ref 60–?)
GLOBULIN PLAS-MCNC: 2.9 G/DL (ref 2.8–4.4)
GLUCOSE BLD-MCNC: 99 MG/DL (ref 70–99)
GLUCOSE UR-MCNC: NORMAL MG/DL
HBA1C MFR BLD: 5.7 % (ref ?–5.7)
HCT VFR BLD AUTO: 42.5 %
HDLC SERPL-MCNC: 33 MG/DL (ref 40–59)
HGB BLD-MCNC: 14.3 G/DL
HGB UR QL STRIP.AUTO: NEGATIVE
IMM GRANULOCYTES # BLD AUTO: 0.03 X10(3) UL (ref 0–1)
IMM GRANULOCYTES NFR BLD: 0.4 %
KETONES UR-MCNC: NEGATIVE MG/DL
LDLC SERPL CALC-MCNC: 62 MG/DL (ref ?–100)
LEUKOCYTE ESTERASE UR QL STRIP.AUTO: NEGATIVE
LYMPHOCYTES # BLD AUTO: 2.31 X10(3) UL (ref 1–4)
LYMPHOCYTES NFR BLD AUTO: 30.5 %
MCH RBC QN AUTO: 29.8 PG (ref 26–34)
MCHC RBC AUTO-ENTMCNC: 33.6 G/DL (ref 31–37)
MCV RBC AUTO: 88.5 FL
MICROALBUMIN UR-MCNC: 1.6 MG/DL
MICROALBUMIN/CREAT 24H UR-RTO: 8.5 UG/MG (ref ?–30)
MONOCYTES # BLD AUTO: 0.62 X10(3) UL (ref 0.1–1)
MONOCYTES NFR BLD AUTO: 8.2 %
NEUTROPHILS # BLD AUTO: 4.39 X10 (3) UL (ref 1.5–7.7)
NEUTROPHILS # BLD AUTO: 4.39 X10(3) UL (ref 1.5–7.7)
NEUTROPHILS NFR BLD AUTO: 58 %
NITRITE UR QL STRIP.AUTO: NEGATIVE
NONHDLC SERPL-MCNC: 79 MG/DL (ref ?–130)
OSMOLALITY SERPL CALC.SUM OF ELEC: 291 MOSM/KG (ref 275–295)
PH UR: 6.5 [PH] (ref 5–8)
PLATELET # BLD AUTO: 188 10(3)UL (ref 150–450)
POTASSIUM SERPL-SCNC: 3.9 MMOL/L (ref 3.5–5.1)
PROT SERPL-MCNC: 6.9 G/DL (ref 6.4–8.2)
PROT UR-MCNC: NEGATIVE MG/DL
RBC # BLD AUTO: 4.8 X10(6)UL
SODIUM SERPL-SCNC: 140 MMOL/L (ref 136–145)
SP GR UR STRIP: 1.02 (ref 1–1.03)
TRIGL SERPL-MCNC: 89 MG/DL (ref 30–149)
TSI SER-ACNC: 2.25 MIU/ML (ref 0.36–3.74)
UROBILINOGEN UR STRIP-ACNC: NORMAL
VLDLC SERPL CALC-MCNC: 13 MG/DL (ref 0–30)
WBC # BLD AUTO: 7.6 X10(3) UL (ref 4–11)

## 2023-03-03 PROCEDURE — 36415 COLL VENOUS BLD VENIPUNCTURE: CPT

## 2023-03-03 PROCEDURE — 83036 HEMOGLOBIN GLYCOSYLATED A1C: CPT

## 2023-03-03 PROCEDURE — 84443 ASSAY THYROID STIM HORMONE: CPT

## 2023-03-03 PROCEDURE — 85025 COMPLETE CBC W/AUTO DIFF WBC: CPT

## 2023-03-03 PROCEDURE — 80053 COMPREHEN METABOLIC PANEL: CPT

## 2023-03-03 PROCEDURE — 82570 ASSAY OF URINE CREATININE: CPT

## 2023-03-03 PROCEDURE — 80061 LIPID PANEL: CPT

## 2023-03-03 PROCEDURE — 82043 UR ALBUMIN QUANTITATIVE: CPT

## 2023-03-03 NOTE — TELEPHONE ENCOUNTER
Patient here at [facility] lab for blood draw. No orders in the system. Asking for orders to be entered, is waiting in physician lobby.

## 2023-03-09 ENCOUNTER — OFFICE VISIT (OUTPATIENT)
Dept: INTERNAL MEDICINE CLINIC | Facility: CLINIC | Age: 83
End: 2023-03-09

## 2023-03-09 VITALS
HEIGHT: 67 IN | HEART RATE: 60 BPM | OXYGEN SATURATION: 100 % | SYSTOLIC BLOOD PRESSURE: 130 MMHG | TEMPERATURE: 98 F | WEIGHT: 179 LBS | DIASTOLIC BLOOD PRESSURE: 68 MMHG | BODY MASS INDEX: 28.09 KG/M2

## 2023-03-09 DIAGNOSIS — K58.9 IRRITABLE BOWEL SYNDROME WITHOUT DIARRHEA: ICD-10-CM

## 2023-03-09 DIAGNOSIS — G62.9 PERIPHERAL POLYNEUROPATHY: ICD-10-CM

## 2023-03-09 DIAGNOSIS — I34.0 MITRAL VALVE INSUFFICIENCY, UNSPECIFIED ETIOLOGY: ICD-10-CM

## 2023-03-09 DIAGNOSIS — R53.83 FATIGUE, UNSPECIFIED TYPE: ICD-10-CM

## 2023-03-09 DIAGNOSIS — D69.6 THROMBOCYTOPENIA (HCC): ICD-10-CM

## 2023-03-09 DIAGNOSIS — E78.1 HYPERTRIGLYCERIDEMIA: ICD-10-CM

## 2023-03-09 DIAGNOSIS — E11.9 TYPE 2 DIABETES MELLITUS WITHOUT COMPLICATION, WITHOUT LONG-TERM CURRENT USE OF INSULIN (HCC): ICD-10-CM

## 2023-03-09 DIAGNOSIS — N40.1 BENIGN NON-NODULAR PROSTATIC HYPERPLASIA WITH LOWER URINARY TRACT SYMPTOMS: ICD-10-CM

## 2023-03-09 DIAGNOSIS — N18.31 STAGE 3A CHRONIC KIDNEY DISEASE (HCC): ICD-10-CM

## 2023-03-09 DIAGNOSIS — M48.061 SPINAL STENOSIS OF LUMBAR REGION, UNSPECIFIED WHETHER NEUROGENIC CLAUDICATION PRESENT: ICD-10-CM

## 2023-03-09 DIAGNOSIS — K21.9 GASTROESOPHAGEAL REFLUX DISEASE, UNSPECIFIED WHETHER ESOPHAGITIS PRESENT: ICD-10-CM

## 2023-03-09 DIAGNOSIS — M15.9 PRIMARY OSTEOARTHRITIS INVOLVING MULTIPLE JOINTS: ICD-10-CM

## 2023-03-09 DIAGNOSIS — I25.10 ASHD (ARTERIOSCLEROTIC HEART DISEASE): ICD-10-CM

## 2023-03-09 DIAGNOSIS — I35.0 AORTIC VALVE STENOSIS, ETIOLOGY OF CARDIAC VALVE DISEASE UNSPECIFIED: ICD-10-CM

## 2023-03-09 DIAGNOSIS — I10 ESSENTIAL HYPERTENSION: Primary | ICD-10-CM

## 2023-03-09 DIAGNOSIS — E78.00 HYPERCHOLESTEROLEMIA: ICD-10-CM

## 2023-03-09 PROCEDURE — 3075F SYST BP GE 130 - 139MM HG: CPT | Performed by: INTERNAL MEDICINE

## 2023-03-09 PROCEDURE — 3008F BODY MASS INDEX DOCD: CPT | Performed by: INTERNAL MEDICINE

## 2023-03-09 PROCEDURE — 3078F DIAST BP <80 MM HG: CPT | Performed by: INTERNAL MEDICINE

## 2023-03-09 PROCEDURE — 99214 OFFICE O/P EST MOD 30 MIN: CPT | Performed by: INTERNAL MEDICINE

## 2023-03-09 PROCEDURE — 1126F AMNT PAIN NOTED NONE PRSNT: CPT | Performed by: INTERNAL MEDICINE

## 2023-03-13 RX ORDER — FAMOTIDINE 20 MG/1
20 TABLET, FILM COATED ORAL 2 TIMES DAILY
Qty: 180 TABLET | Refills: 3 | Status: SHIPPED | OUTPATIENT
Start: 2023-03-13

## 2023-03-14 NOTE — PROCEDURE: SUPERFICIAL RADIATION TREATMENT
Detail Level: Zone Manas Stanley (MD)  Plastic Surgery  [address] [place] [address], [address]  [place] [address]  Phone: [phone]  Fax: [phone]  Follow Up Time: 1-3 days

## 2023-03-30 ENCOUNTER — TELEPHONE (OUTPATIENT)
Dept: INTERNAL MEDICINE CLINIC | Facility: CLINIC | Age: 83
End: 2023-03-30

## 2023-03-30 RX ORDER — DONEPEZIL HYDROCHLORIDE 10 MG/1
10 TABLET, FILM COATED ORAL NIGHTLY
Qty: 90 TABLET | Refills: 3 | Status: SHIPPED | OUTPATIENT
Start: 2023-03-30 | End: 2024-03-29

## 2023-03-30 NOTE — TELEPHONE ENCOUNTER
Pt forgot to mention at his last OV his short term memory seems to be getting worse  Requests call back to advise if he can take more Donepezil or is there something else    Please call pt [phone]

## 2023-03-30 NOTE — TELEPHONE ENCOUNTER
To Dr. TATUM:  Pt states he has had more problems with his short term memory over the last 2 months. Denies any safety concerns, but states he's having more trouble conversing with friends/family. Pt takes donepezil 5 mg daily. Inquiring if dose needs to be increased. Patient was notified that this message will be routed to the physician to determine what their recommendation (s) would be. In the meantime, if they develop new or worsening symptoms, they were advised to call back or seek emergent evaluation at the ER.

## 2023-03-31 NOTE — TELEPHONE ENCOUNTER
Telephone call to patient. Patient feels that he is having memory problems. He wonders if he should increase his dose of donepezil. I will increase patient's donepezil to 10 mg daily. Patient may double up on the 5 mg tablets he has until the 10 mg tablet prescription is available. I will send this to his pharmacy. Patient will keep me updated as to how he is doing.

## 2023-04-05 RX ORDER — DUTASTERIDE 0.5 MG/1
0.5 CAPSULE, LIQUID FILLED ORAL DAILY
Qty: 90 CAPSULE | Refills: 3 | Status: SHIPPED | OUTPATIENT
Start: 2023-04-05

## 2023-04-09 RX ORDER — DUTASTERIDE 0.5 MG/1
CAPSULE, LIQUID FILLED ORAL
Qty: 90 CAPSULE | Refills: 3 | OUTPATIENT
Start: 2023-04-09

## 2023-05-04 RX ORDER — METOPROLOL SUCCINATE 25 MG/1
TABLET, EXTENDED RELEASE ORAL
Qty: 90 TABLET | Refills: 3 | Status: SHIPPED | OUTPATIENT
Start: 2023-05-04

## 2023-05-10 RX ORDER — LOSARTAN POTASSIUM 100 MG/1
TABLET ORAL
Qty: 90 TABLET | Refills: 3 | Status: SHIPPED | OUTPATIENT
Start: 2023-05-10

## 2023-06-19 ENCOUNTER — LAB ENCOUNTER (OUTPATIENT)
Dept: LAB | Age: 83
End: 2023-06-19
Attending: INTERNAL MEDICINE
Payer: MEDICARE

## 2023-06-19 DIAGNOSIS — E11.9 TYPE 2 DIABETES MELLITUS WITHOUT COMPLICATION, WITHOUT LONG-TERM CURRENT USE OF INSULIN (HCC): ICD-10-CM

## 2023-06-19 DIAGNOSIS — N18.31 STAGE 3A CHRONIC KIDNEY DISEASE (HCC): ICD-10-CM

## 2023-06-19 DIAGNOSIS — E78.00 HYPERCHOLESTEROLEMIA: ICD-10-CM

## 2023-06-19 DIAGNOSIS — E78.1 HYPERTRIGLYCERIDEMIA: ICD-10-CM

## 2023-06-19 DIAGNOSIS — R53.83 FATIGUE, UNSPECIFIED TYPE: ICD-10-CM

## 2023-06-19 DIAGNOSIS — D69.6 THROMBOCYTOPENIA (HCC): ICD-10-CM

## 2023-06-19 LAB
ALT SERPL-CCNC: 31 U/L
ANION GAP SERPL CALC-SCNC: 5 MMOL/L (ref 0–18)
AST SERPL-CCNC: 26 U/L (ref 15–37)
BASOPHILS # BLD AUTO: 0.02 X10(3) UL (ref 0–0.2)
BASOPHILS NFR BLD AUTO: 0.2 %
BUN BLD-MCNC: 14 MG/DL (ref 7–18)
BUN/CREAT SERPL: 12.7 (ref 10–20)
CALCIUM BLD-MCNC: 8.9 MG/DL (ref 8.5–10.1)
CHLORIDE SERPL-SCNC: 110 MMOL/L (ref 98–112)
CHOLEST SERPL-MCNC: 101 MG/DL (ref ?–200)
CO2 SERPL-SCNC: 27 MMOL/L (ref 21–32)
CREAT BLD-MCNC: 1.1 MG/DL
DEPRECATED RDW RBC AUTO: 42.4 FL (ref 35.1–46.3)
EOSINOPHIL # BLD AUTO: 0.28 X10(3) UL (ref 0–0.7)
EOSINOPHIL NFR BLD AUTO: 3.5 %
ERYTHROCYTE [DISTWIDTH] IN BLOOD BY AUTOMATED COUNT: 13 % (ref 11–15)
EST. AVERAGE GLUCOSE BLD GHB EST-MCNC: 117 MG/DL (ref 68–126)
FASTING PATIENT LIPID ANSWER: YES
FASTING STATUS PATIENT QL REPORTED: YES
GFR SERPLBLD BASED ON 1.73 SQ M-ARVRAT: 67 ML/MIN/1.73M2 (ref 60–?)
GLUCOSE BLD-MCNC: 78 MG/DL (ref 70–99)
HBA1C MFR BLD: 5.7 % (ref ?–5.7)
HCT VFR BLD AUTO: 42.5 %
HDLC SERPL-MCNC: 36 MG/DL (ref 40–59)
HGB BLD-MCNC: 14.1 G/DL
IMM GRANULOCYTES # BLD AUTO: 0.02 X10(3) UL (ref 0–1)
IMM GRANULOCYTES NFR BLD: 0.2 %
LDLC SERPL CALC-MCNC: 54 MG/DL (ref ?–100)
LYMPHOCYTES # BLD AUTO: 2.81 X10(3) UL (ref 1–4)
LYMPHOCYTES NFR BLD AUTO: 34.7 %
MCH RBC QN AUTO: 29.6 PG (ref 26–34)
MCHC RBC AUTO-ENTMCNC: 33.2 G/DL (ref 31–37)
MCV RBC AUTO: 89.3 FL
MONOCYTES # BLD AUTO: 0.7 X10(3) UL (ref 0.1–1)
MONOCYTES NFR BLD AUTO: 8.7 %
NEUTROPHILS # BLD AUTO: 4.26 X10 (3) UL (ref 1.5–7.7)
NEUTROPHILS # BLD AUTO: 4.26 X10(3) UL (ref 1.5–7.7)
NEUTROPHILS NFR BLD AUTO: 52.7 %
NONHDLC SERPL-MCNC: 65 MG/DL (ref ?–130)
OSMOLALITY SERPL CALC.SUM OF ELEC: 293 MOSM/KG (ref 275–295)
PLATELET # BLD AUTO: 125 10(3)UL (ref 150–450)
POTASSIUM SERPL-SCNC: 4.2 MMOL/L (ref 3.5–5.1)
RBC # BLD AUTO: 4.76 X10(6)UL
SODIUM SERPL-SCNC: 142 MMOL/L (ref 136–145)
TRIGL SERPL-MCNC: 42 MG/DL (ref 30–149)
VLDLC SERPL CALC-MCNC: 6 MG/DL (ref 0–30)
WBC # BLD AUTO: 8.1 X10(3) UL (ref 4–11)

## 2023-06-19 PROCEDURE — 80048 BASIC METABOLIC PNL TOTAL CA: CPT

## 2023-06-19 PROCEDURE — 84450 TRANSFERASE (AST) (SGOT): CPT

## 2023-06-19 PROCEDURE — 36415 COLL VENOUS BLD VENIPUNCTURE: CPT

## 2023-06-19 PROCEDURE — 85025 COMPLETE CBC W/AUTO DIFF WBC: CPT

## 2023-06-19 PROCEDURE — 84460 ALANINE AMINO (ALT) (SGPT): CPT

## 2023-06-19 PROCEDURE — 83036 HEMOGLOBIN GLYCOSYLATED A1C: CPT

## 2023-06-19 PROCEDURE — 80061 LIPID PANEL: CPT

## 2023-06-26 ENCOUNTER — OFFICE VISIT (OUTPATIENT)
Dept: INTERNAL MEDICINE CLINIC | Facility: CLINIC | Age: 83
End: 2023-06-26

## 2023-06-26 VITALS
HEART RATE: 60 BPM | BODY MASS INDEX: 28.41 KG/M2 | WEIGHT: 181 LBS | DIASTOLIC BLOOD PRESSURE: 60 MMHG | HEIGHT: 67 IN | TEMPERATURE: 98 F | OXYGEN SATURATION: 95 % | SYSTOLIC BLOOD PRESSURE: 128 MMHG

## 2023-06-26 DIAGNOSIS — M48.061 SPINAL STENOSIS OF LUMBAR REGION, UNSPECIFIED WHETHER NEUROGENIC CLAUDICATION PRESENT: ICD-10-CM

## 2023-06-26 DIAGNOSIS — H61.22 IMPACTED CERUMEN OF LEFT EAR: ICD-10-CM

## 2023-06-26 DIAGNOSIS — I10 ESSENTIAL HYPERTENSION: Primary | ICD-10-CM

## 2023-06-26 DIAGNOSIS — K58.9 IRRITABLE BOWEL SYNDROME WITHOUT DIARRHEA: ICD-10-CM

## 2023-06-26 DIAGNOSIS — E11.9 TYPE 2 DIABETES MELLITUS WITHOUT COMPLICATION, WITHOUT LONG-TERM CURRENT USE OF INSULIN (HCC): ICD-10-CM

## 2023-06-26 DIAGNOSIS — G62.9 PERIPHERAL POLYNEUROPATHY: ICD-10-CM

## 2023-06-26 DIAGNOSIS — M54.50 CHRONIC BILATERAL LOW BACK PAIN WITHOUT SCIATICA: ICD-10-CM

## 2023-06-26 DIAGNOSIS — R53.83 FATIGUE, UNSPECIFIED TYPE: ICD-10-CM

## 2023-06-26 DIAGNOSIS — N18.31 STAGE 3A CHRONIC KIDNEY DISEASE (HCC): ICD-10-CM

## 2023-06-26 DIAGNOSIS — G89.29 CHRONIC BILATERAL LOW BACK PAIN WITHOUT SCIATICA: ICD-10-CM

## 2023-06-26 DIAGNOSIS — D69.6 THROMBOCYTOPENIA (HCC): ICD-10-CM

## 2023-06-26 DIAGNOSIS — K21.9 GASTROESOPHAGEAL REFLUX DISEASE, UNSPECIFIED WHETHER ESOPHAGITIS PRESENT: ICD-10-CM

## 2023-06-26 DIAGNOSIS — E78.1 HYPERTRIGLYCERIDEMIA: ICD-10-CM

## 2023-06-26 DIAGNOSIS — E78.00 HYPERCHOLESTEROLEMIA: ICD-10-CM

## 2023-06-26 DIAGNOSIS — M15.9 PRIMARY OSTEOARTHRITIS INVOLVING MULTIPLE JOINTS: ICD-10-CM

## 2023-06-26 DIAGNOSIS — I34.0 MITRAL VALVE INSUFFICIENCY, UNSPECIFIED ETIOLOGY: ICD-10-CM

## 2023-06-26 DIAGNOSIS — N40.1 BENIGN NON-NODULAR PROSTATIC HYPERPLASIA WITH LOWER URINARY TRACT SYMPTOMS: ICD-10-CM

## 2023-06-26 PROCEDURE — 1125F AMNT PAIN NOTED PAIN PRSNT: CPT | Performed by: INTERNAL MEDICINE

## 2023-06-26 PROCEDURE — 1170F FXNL STATUS ASSESSED: CPT | Performed by: INTERNAL MEDICINE

## 2023-06-26 PROCEDURE — 3078F DIAST BP <80 MM HG: CPT | Performed by: INTERNAL MEDICINE

## 2023-06-26 PROCEDURE — 99214 OFFICE O/P EST MOD 30 MIN: CPT | Performed by: INTERNAL MEDICINE

## 2023-06-26 PROCEDURE — 3008F BODY MASS INDEX DOCD: CPT | Performed by: INTERNAL MEDICINE

## 2023-06-26 PROCEDURE — 1160F RVW MEDS BY RX/DR IN RCRD: CPT | Performed by: INTERNAL MEDICINE

## 2023-06-26 PROCEDURE — 1159F MED LIST DOCD IN RCRD: CPT | Performed by: INTERNAL MEDICINE

## 2023-06-26 PROCEDURE — 3074F SYST BP LT 130 MM HG: CPT | Performed by: INTERNAL MEDICINE

## 2023-06-26 RX ORDER — CETIRIZINE HYDROCHLORIDE 10 MG/1
10 TABLET ORAL DAILY
COMMUNITY

## 2023-06-26 NOTE — PATIENT INSTRUCTIONS
Patient is to continue his current diet, medication and activity. Patient is up-to-date on his COVID vaccines. He had his sixth vaccine on June 12 at his [facility] pharmacy. I will give patient low back exercises that he can perform to see if this will help his low back improve. When patient showers he is let the warm water go into his left ear to help dissolve the wax. I will see the patient back in 4 months with blood test which will include a CBC, BMP, hemoglobin A1c, lipid panel, AST and ALT.

## 2023-07-28 RX ORDER — GLIMEPIRIDE 2 MG/1
TABLET ORAL
Qty: 90 TABLET | Refills: 3 | Status: SHIPPED | OUTPATIENT
Start: 2023-07-28

## 2023-07-28 NOTE — TELEPHONE ENCOUNTER
Refill request is for a maintenance medication and has met the criteria specified in the Ambulatory Medication Refill Standing Order for eligibility, visits, laboratory, alerts and was sent to the requested pharmacy.     Requested Prescriptions     Signed Prescriptions Disp Refills    METFORMIN HCL 1000 MG Oral Tab 180 tablet 3     Sig: TAKE 1 TABLET TWICE DAILY  WITH MEALS     Authorizing Provider: Yovani Larsen     Ordering User: TAMMY MCCALL    GLIMEPIRIDE 2 MG Oral Tab 90 tablet 3     Sig: TAKE 1 TABLET DAILY WITH   BREAKFAST     Authorizing Provider: Yovani Larsen     Ordering User: Woody Severe

## 2023-08-15 ENCOUNTER — TELEPHONE (OUTPATIENT)
Dept: INTERNAL MEDICINE CLINIC | Facility: CLINIC | Age: 83
End: 2023-08-15

## 2023-08-16 RX ORDER — ZOLPIDEM TARTRATE 10 MG/1
10 TABLET ORAL NIGHTLY PRN
Qty: 90 TABLET | Refills: 1 | Status: SHIPPED | OUTPATIENT
Start: 2023-08-16

## 2023-08-16 NOTE — TELEPHONE ENCOUNTER
To MD:  The above refill request is for a controlled substance. Please review pended medication order. Print and sign for staff to fax to pharmacy or prescribe electronically.     Last office visit: 6/26/23  Last time refill sent and quantity/refills: 2/16/23 #90 with 1   Per [place] , last dispensed #90 5/17/23

## 2023-08-30 RX ORDER — CELECOXIB 200 MG/1
200 CAPSULE ORAL DAILY
Qty: 90 CAPSULE | Refills: 3 | Status: SHIPPED | OUTPATIENT
Start: 2023-08-30

## 2023-09-12 RX ORDER — AMLODIPINE BESYLATE 10 MG/1
TABLET ORAL
Qty: 90 TABLET | Refills: 3 | Status: SHIPPED | OUTPATIENT
Start: 2023-09-12

## 2023-09-28 RX ORDER — IPRATROPIUM BROMIDE 42 UG/1
2 SPRAY, METERED NASAL 4 TIMES DAILY
Qty: 30 ML | Refills: 3 | Status: SHIPPED | OUTPATIENT
Start: 2023-09-28

## 2023-09-28 NOTE — TELEPHONE ENCOUNTER
Refill request is for a maintenance medication and has met the criteria specified in the Ambulatory Medication Refill Standing Order for eligibility, visits, laboratory, alerts and was sent to the requested pharmacy. Requested Prescriptions     Signed Prescriptions Disp Refills    ipratropium 0.06 % Nasal Solution 30 mL 3     Si sprays by Nasal route 4 (four) times daily.      Authorizing Provider: Rogelio Tyler     Ordering User: Yaa De Santiago

## 2023-10-18 ENCOUNTER — LAB ENCOUNTER (OUTPATIENT)
Dept: LAB | Age: 83
End: 2023-10-18
Attending: INTERNAL MEDICINE
Payer: MEDICARE

## 2023-10-18 DIAGNOSIS — E11.9 TYPE 2 DIABETES MELLITUS WITHOUT COMPLICATION, WITHOUT LONG-TERM CURRENT USE OF INSULIN (HCC): ICD-10-CM

## 2023-10-18 DIAGNOSIS — E78.1 HYPERTRIGLYCERIDEMIA: ICD-10-CM

## 2023-10-18 DIAGNOSIS — E78.00 HYPERCHOLESTEROLEMIA: ICD-10-CM

## 2023-10-18 DIAGNOSIS — R53.83 FATIGUE, UNSPECIFIED TYPE: ICD-10-CM

## 2023-10-18 DIAGNOSIS — D69.6 THROMBOCYTOPENIA (HCC): ICD-10-CM

## 2023-10-18 LAB
ALT SERPL-CCNC: 25 U/L
ANION GAP SERPL CALC-SCNC: 8 MMOL/L (ref 0–18)
AST SERPL-CCNC: 19 U/L (ref 15–37)
BASOPHILS # BLD AUTO: 0.01 X10(3) UL (ref 0–0.2)
BASOPHILS NFR BLD AUTO: 0.1 %
BUN BLD-MCNC: 15 MG/DL (ref 7–18)
BUN/CREAT SERPL: 11.9 (ref 10–20)
CALCIUM BLD-MCNC: 9.6 MG/DL (ref 8.5–10.1)
CHLORIDE SERPL-SCNC: 105 MMOL/L (ref 98–112)
CHOLEST SERPL-MCNC: 102 MG/DL (ref ?–200)
CO2 SERPL-SCNC: 29 MMOL/L (ref 21–32)
CREAT BLD-MCNC: 1.26 MG/DL
DEPRECATED RDW RBC AUTO: 43.1 FL (ref 35.1–46.3)
EGFRCR SERPLBLD CKD-EPI 2021: 56 ML/MIN/1.73M2 (ref 60–?)
EOSINOPHIL # BLD AUTO: 0.25 X10(3) UL (ref 0–0.7)
EOSINOPHIL NFR BLD AUTO: 2.8 %
ERYTHROCYTE [DISTWIDTH] IN BLOOD BY AUTOMATED COUNT: 13.3 % (ref 11–15)
EST. AVERAGE GLUCOSE BLD GHB EST-MCNC: 111 MG/DL (ref 68–126)
FASTING PATIENT LIPID ANSWER: YES
FASTING STATUS PATIENT QL REPORTED: YES
GLUCOSE BLD-MCNC: 137 MG/DL (ref 70–99)
HBA1C MFR BLD: 5.5 % (ref ?–5.7)
HCT VFR BLD AUTO: 41.6 %
HDLC SERPL-MCNC: 32 MG/DL (ref 40–59)
HGB BLD-MCNC: 13.8 G/DL
IMM GRANULOCYTES # BLD AUTO: 0.02 X10(3) UL (ref 0–1)
IMM GRANULOCYTES NFR BLD: 0.2 %
LDLC SERPL CALC-MCNC: 46 MG/DL (ref ?–100)
LYMPHOCYTES # BLD AUTO: 2.17 X10(3) UL (ref 1–4)
LYMPHOCYTES NFR BLD AUTO: 24.3 %
MCH RBC QN AUTO: 29.1 PG (ref 26–34)
MCHC RBC AUTO-ENTMCNC: 33.2 G/DL (ref 31–37)
MCV RBC AUTO: 87.8 FL
MONOCYTES # BLD AUTO: 0.82 X10(3) UL (ref 0.1–1)
MONOCYTES NFR BLD AUTO: 9.2 %
NEUTROPHILS # BLD AUTO: 5.66 X10 (3) UL (ref 1.5–7.7)
NEUTROPHILS # BLD AUTO: 5.66 X10(3) UL (ref 1.5–7.7)
NEUTROPHILS NFR BLD AUTO: 63.4 %
NONHDLC SERPL-MCNC: 70 MG/DL (ref ?–130)
OSMOLALITY SERPL CALC.SUM OF ELEC: 297 MOSM/KG (ref 275–295)
PLATELET # BLD AUTO: 118 10(3)UL (ref 150–450)
POTASSIUM SERPL-SCNC: 4.1 MMOL/L (ref 3.5–5.1)
RBC # BLD AUTO: 4.74 X10(6)UL
SODIUM SERPL-SCNC: 142 MMOL/L (ref 136–145)
TRIGL SERPL-MCNC: 134 MG/DL (ref 30–149)
VLDLC SERPL CALC-MCNC: 19 MG/DL (ref 0–30)
WBC # BLD AUTO: 8.9 X10(3) UL (ref 4–11)

## 2023-10-18 PROCEDURE — 84450 TRANSFERASE (AST) (SGOT): CPT

## 2023-10-18 PROCEDURE — 84460 ALANINE AMINO (ALT) (SGPT): CPT

## 2023-10-18 PROCEDURE — 36415 COLL VENOUS BLD VENIPUNCTURE: CPT

## 2023-10-18 PROCEDURE — 83036 HEMOGLOBIN GLYCOSYLATED A1C: CPT

## 2023-10-18 PROCEDURE — 80061 LIPID PANEL: CPT

## 2023-10-18 PROCEDURE — 85025 COMPLETE CBC W/AUTO DIFF WBC: CPT

## 2023-10-18 PROCEDURE — 80048 BASIC METABOLIC PNL TOTAL CA: CPT

## 2023-11-01 ENCOUNTER — OFFICE VISIT (OUTPATIENT)
Dept: INTERNAL MEDICINE CLINIC | Facility: CLINIC | Age: 83
End: 2023-11-01

## 2023-11-01 VITALS
DIASTOLIC BLOOD PRESSURE: 60 MMHG | TEMPERATURE: 99 F | HEIGHT: 67 IN | BODY MASS INDEX: 27 KG/M2 | SYSTOLIC BLOOD PRESSURE: 138 MMHG | HEART RATE: 60 BPM | WEIGHT: 172 LBS

## 2023-11-01 DIAGNOSIS — I10 ESSENTIAL HYPERTENSION: Primary | ICD-10-CM

## 2023-11-01 DIAGNOSIS — G62.9 PERIPHERAL POLYNEUROPATHY: ICD-10-CM

## 2023-11-01 DIAGNOSIS — M48.061 SPINAL STENOSIS OF LUMBAR REGION, UNSPECIFIED WHETHER NEUROGENIC CLAUDICATION PRESENT: ICD-10-CM

## 2023-11-01 DIAGNOSIS — R53.83 FATIGUE, UNSPECIFIED TYPE: ICD-10-CM

## 2023-11-01 DIAGNOSIS — M54.50 CHRONIC BILATERAL LOW BACK PAIN WITHOUT SCIATICA: ICD-10-CM

## 2023-11-01 DIAGNOSIS — I25.10 ASHD (ARTERIOSCLEROTIC HEART DISEASE): ICD-10-CM

## 2023-11-01 DIAGNOSIS — N40.1 BENIGN NON-NODULAR PROSTATIC HYPERPLASIA WITH LOWER URINARY TRACT SYMPTOMS: ICD-10-CM

## 2023-11-01 DIAGNOSIS — I34.0 MITRAL VALVE INSUFFICIENCY, UNSPECIFIED ETIOLOGY: ICD-10-CM

## 2023-11-01 DIAGNOSIS — D69.6 THROMBOCYTOPENIA (HCC): ICD-10-CM

## 2023-11-01 DIAGNOSIS — E11.9 TYPE 2 DIABETES MELLITUS WITHOUT COMPLICATION, WITHOUT LONG-TERM CURRENT USE OF INSULIN (HCC): ICD-10-CM

## 2023-11-01 DIAGNOSIS — E78.1 HYPERTRIGLYCERIDEMIA: ICD-10-CM

## 2023-11-01 DIAGNOSIS — G89.29 CHRONIC BILATERAL LOW BACK PAIN WITHOUT SCIATICA: ICD-10-CM

## 2023-11-01 DIAGNOSIS — K21.9 GASTROESOPHAGEAL REFLUX DISEASE, UNSPECIFIED WHETHER ESOPHAGITIS PRESENT: ICD-10-CM

## 2023-11-01 DIAGNOSIS — E78.00 HYPERCHOLESTEROLEMIA: ICD-10-CM

## 2023-11-01 DIAGNOSIS — K58.9 IRRITABLE BOWEL SYNDROME WITHOUT DIARRHEA: ICD-10-CM

## 2023-11-01 DIAGNOSIS — I35.0 AORTIC VALVE STENOSIS, ETIOLOGY OF CARDIAC VALVE DISEASE UNSPECIFIED: ICD-10-CM

## 2023-11-01 DIAGNOSIS — M15.9 PRIMARY OSTEOARTHRITIS INVOLVING MULTIPLE JOINTS: ICD-10-CM

## 2023-11-01 DIAGNOSIS — N18.31 STAGE 3A CHRONIC KIDNEY DISEASE (HCC): ICD-10-CM

## 2023-11-01 PROCEDURE — 3075F SYST BP GE 130 - 139MM HG: CPT | Performed by: INTERNAL MEDICINE

## 2023-11-01 PROCEDURE — 1159F MED LIST DOCD IN RCRD: CPT | Performed by: INTERNAL MEDICINE

## 2023-11-01 PROCEDURE — 1126F AMNT PAIN NOTED NONE PRSNT: CPT | Performed by: INTERNAL MEDICINE

## 2023-11-01 PROCEDURE — 1160F RVW MEDS BY RX/DR IN RCRD: CPT | Performed by: INTERNAL MEDICINE

## 2023-11-01 PROCEDURE — 3008F BODY MASS INDEX DOCD: CPT | Performed by: INTERNAL MEDICINE

## 2023-11-01 PROCEDURE — 3078F DIAST BP <80 MM HG: CPT | Performed by: INTERNAL MEDICINE

## 2023-11-01 PROCEDURE — 99214 OFFICE O/P EST MOD 30 MIN: CPT | Performed by: INTERNAL MEDICINE

## 2023-11-01 NOTE — PATIENT INSTRUCTIONS
Patient is to continue his current diet, medication and activity. Patient has received his flu vaccine, COVID-vaccine and RSV vaccine. Patient is to stop his glimepiride. Patient is to continue taking his metformin 1000 mg orally twice daily. To the patient back in 4 months with blood test which will include a CBC, BMP, hemoglobin A1c, lipid panel, AST, ALT and CPK. I will see the patient back sooner as necessary.

## 2023-12-08 RX ORDER — DULOXETIN HYDROCHLORIDE 30 MG/1
CAPSULE, DELAYED RELEASE ORAL
Qty: 90 CAPSULE | Refills: 3 | Status: SHIPPED | OUTPATIENT
Start: 2023-12-08

## 2023-12-08 NOTE — TELEPHONE ENCOUNTER
Refill request is for a maintenance medication and has met the criteria specified in the Ambulatory Medication Refill Standing Order for eligibility, visits, laboratory, alerts and was sent to the requested pharmacy.     Requested Prescriptions     Signed Prescriptions Disp Refills    DULOXETINE 30 MG Oral Cap DR Particles 90 capsule 3     Sig: TAKE 1 CAPSULE DAILY     Authorizing Provider: Herbie Main     Ordering User: Joel Butler

## 2024-01-01 NOTE — PROCEDURE: SUPERFICIAL RADIATION TREATMENT
reviewed indications for triple feeding; encouraged mom to speak to pediatrician about baby's tongue and latch; mom reports she is comfortable at this time.,/initiate/review safe skin-to-skin/initiate/review hand expression/initiate/review pumping guidelines and safe milk handling/reverse pressure softening/initiate/review techniques for position and latch/post discharge community resources provided/initiate/review supplementation plan due to medical indications/review techniques to increase milk supply/review techniques to manage sore nipples/engorgement/initiate/review finger suck/initiate/review breast massage/compression/reviewed components of an effective feeding and at least 8 effective feedings per day required/reviewed importance of monitoring infant diapers, the breastfeeding log, and minimum output each day/reviewed risks of unnecessary formula supplementation/reviewed risks of artificial nipples/reviewed strategies to transition to breastfeeding only/reviewed benefits and recommendations for rooming in/reviewed feeding on demand/by cue at least 8 times a day/recommended follow-up with pediatrician within 24 hours of discharge/reviewed indications of inadequate milk transfer that would require supplementation mom reports baby is cuing to feed after every feeding and she is very sore; reinforced the importance of positioning to enable effective latching and also discussed triple feeding to continue until baby sees pediatrician tomorrow; mom will f/u after discharge with LC in her community./initiate/review safe skin-to-skin/initiate/review hand expression/initiate/review pumping guidelines and safe milk handling/reverse pressure softening/initiate/review techniques for position and latch/post discharge community resources provided/initiate/review supplementation plan due to medical indications/review techniques to increase milk supply/review techniques to manage sore nipples/engorgement/initiate/review breast massage/compression/reviewed components of an effective feeding and at least 8 effective feedings per day required/reviewed importance of monitoring infant diapers, the breastfeeding log, and minimum output each day/reviewed risks of unnecessary formula supplementation/reviewed risks of artificial nipples/reviewed strategies to transition to breastfeeding only/reviewed benefits and recommendations for rooming in/reviewed feeding on demand/by cue at least 8 times a day/recommended follow-up with pediatrician within 24 hours of discharge/reviewed indications of inadequate milk transfer that would require supplementation initiate/review safe skin-to-skin/initiate/review hand expression/initiate/review techniques for position and latch/post discharge community resources provided/initiate/review supplementation plan due to medical indications/review techniques to increase milk supply/review techniques to manage sore nipples/engorgement/initiate/review breast massage/compression/reviewed components of an effective feeding and at least 8 effective feedings per day required/reviewed importance of monitoring infant diapers, the breastfeeding log, and minimum output each day/reviewed risks of artificial nipples/reviewed feeding on demand/by cue at least 8 times a day/recommended follow-up with pediatrician within 24 hours of discharge/reviewed indications of inadequate milk transfer that would require supplementation Functional Status: 1 (ambulatory, light activity) initiate/review safe skin-to-skin/initiate/review hand expression/initiate/review pumping guidelines and safe milk handling/reverse pressure softening/initiate/review techniques for position and latch/post discharge community resources provided/review techniques to increase milk supply/review techniques to manage sore nipples/engorgement/initiate/review finger suck/initiate/review breast massage/compression/reviewed components of an effective feeding and at least 8 effective feedings per day required/reviewed importance of monitoring infant diapers, the breastfeeding log, and minimum output each day/reviewed risks of unnecessary formula supplementation/reviewed strategies to transition to breastfeeding only/reviewed benefits and recommendations for rooming in/reviewed feeding on demand/by cue at least 8 times a day/recommended follow-up with pediatrician within 24 hours of discharge/reviewed indications of inadequate milk transfer that would require supplementation

## 2024-02-13 ENCOUNTER — TELEPHONE (OUTPATIENT)
Dept: INTERNAL MEDICINE CLINIC | Facility: CLINIC | Age: 84
End: 2024-02-13

## 2024-02-13 DIAGNOSIS — G47.00 INSOMNIA, UNSPECIFIED TYPE: Primary | ICD-10-CM

## 2024-02-13 RX ORDER — ZOLPIDEM TARTRATE 10 MG/1
10 TABLET ORAL NIGHTLY PRN
Qty: 90 TABLET | Refills: 1 | Status: SHIPPED | OUTPATIENT
Start: 2024-02-13

## 2024-02-13 NOTE — TELEPHONE ENCOUNTER
To MD:  The above refill request is for a controlled substance.  Please review pended medication order.   Print and sign for staff to fax to pharmacy or prescribe electronically.    Last office visit: 11-1-23   Last time refill sent and quantity/refills: 8-16-23 #90 with 1 refill    ZOLPIDEM TARTRATE 11/15/2023 08/16/2023 10 mg 90  90 GREGORIO CRUZ MD [facility]

## 2024-02-21 ENCOUNTER — LAB ENCOUNTER (OUTPATIENT)
Dept: LAB | Age: 84
End: 2024-02-21
Attending: INTERNAL MEDICINE
Payer: MEDICARE

## 2024-02-21 DIAGNOSIS — E78.00 HYPERCHOLESTEROLEMIA: ICD-10-CM

## 2024-02-21 DIAGNOSIS — D69.6 THROMBOCYTOPENIA (HCC): ICD-10-CM

## 2024-02-21 DIAGNOSIS — R53.83 FATIGUE, UNSPECIFIED TYPE: ICD-10-CM

## 2024-02-21 DIAGNOSIS — E78.1 HYPERTRIGLYCERIDEMIA: ICD-10-CM

## 2024-02-21 DIAGNOSIS — E11.9 TYPE 2 DIABETES MELLITUS WITHOUT COMPLICATION, WITHOUT LONG-TERM CURRENT USE OF INSULIN (HCC): ICD-10-CM

## 2024-02-21 LAB
ALT SERPL-CCNC: 17 U/L
ANION GAP SERPL CALC-SCNC: 7 MMOL/L (ref 0–18)
AST SERPL-CCNC: 25 U/L (ref ?–34)
BASOPHILS # BLD AUTO: 0 X10(3) UL (ref 0–0.2)
BASOPHILS NFR BLD AUTO: 0 %
BUN BLD-MCNC: 13 MG/DL (ref 9–23)
BUN/CREAT SERPL: 12.1 (ref 10–20)
CALCIUM BLD-MCNC: 9.5 MG/DL (ref 8.7–10.4)
CHLORIDE SERPL-SCNC: 107 MMOL/L (ref 98–112)
CHOLEST SERPL-MCNC: 110 MG/DL (ref ?–200)
CK SERPL-CCNC: 65 U/L
CO2 SERPL-SCNC: 28 MMOL/L (ref 21–32)
CREAT BLD-MCNC: 1.07 MG/DL
DEPRECATED RDW RBC AUTO: 42.6 FL (ref 35.1–46.3)
EGFRCR SERPLBLD CKD-EPI 2021: 68 ML/MIN/1.73M2 (ref 60–?)
EOSINOPHIL # BLD AUTO: 0.15 X10(3) UL (ref 0–0.7)
EOSINOPHIL NFR BLD AUTO: 1.9 %
ERYTHROCYTE [DISTWIDTH] IN BLOOD BY AUTOMATED COUNT: 13.7 % (ref 11–15)
EST. AVERAGE GLUCOSE BLD GHB EST-MCNC: 134 MG/DL (ref 68–126)
FASTING PATIENT LIPID ANSWER: YES
FASTING STATUS PATIENT QL REPORTED: YES
GLUCOSE BLD-MCNC: 111 MG/DL (ref 70–99)
HBA1C MFR BLD: 6.3 % (ref ?–5.7)
HCT VFR BLD AUTO: 38.8 %
HDLC SERPL-MCNC: 31 MG/DL (ref 40–59)
HGB BLD-MCNC: 12.9 G/DL
IMM GRANULOCYTES # BLD AUTO: 0.03 X10(3) UL (ref 0–1)
IMM GRANULOCYTES NFR BLD: 0.4 %
LDLC SERPL CALC-MCNC: 63 MG/DL (ref ?–100)
LYMPHOCYTES # BLD AUTO: 2.46 X10(3) UL (ref 1–4)
LYMPHOCYTES NFR BLD AUTO: 31.5 %
MCH RBC QN AUTO: 28.7 PG (ref 26–34)
MCHC RBC AUTO-ENTMCNC: 33.2 G/DL (ref 31–37)
MCV RBC AUTO: 86.2 FL
MONOCYTES # BLD AUTO: 0.63 X10(3) UL (ref 0.1–1)
MONOCYTES NFR BLD AUTO: 8.1 %
NEUTROPHILS # BLD AUTO: 4.53 X10 (3) UL (ref 1.5–7.7)
NEUTROPHILS # BLD AUTO: 4.53 X10(3) UL (ref 1.5–7.7)
NEUTROPHILS NFR BLD AUTO: 58.1 %
NONHDLC SERPL-MCNC: 79 MG/DL (ref ?–130)
OSMOLALITY SERPL CALC.SUM OF ELEC: 295 MOSM/KG (ref 275–295)
PLATELET # BLD AUTO: 138 10(3)UL (ref 150–450)
POTASSIUM SERPL-SCNC: 3.9 MMOL/L (ref 3.5–5.1)
RBC # BLD AUTO: 4.5 X10(6)UL
SODIUM SERPL-SCNC: 142 MMOL/L (ref 136–145)
TRIGL SERPL-MCNC: 78 MG/DL (ref 30–149)
VLDLC SERPL CALC-MCNC: 12 MG/DL (ref 0–30)
WBC # BLD AUTO: 7.8 X10(3) UL (ref 4–11)

## 2024-02-21 PROCEDURE — 84460 ALANINE AMINO (ALT) (SGPT): CPT

## 2024-02-21 PROCEDURE — 83036 HEMOGLOBIN GLYCOSYLATED A1C: CPT

## 2024-02-21 PROCEDURE — 80048 BASIC METABOLIC PNL TOTAL CA: CPT

## 2024-02-21 PROCEDURE — 85025 COMPLETE CBC W/AUTO DIFF WBC: CPT

## 2024-02-21 PROCEDURE — 84450 TRANSFERASE (AST) (SGOT): CPT

## 2024-02-21 PROCEDURE — 36415 COLL VENOUS BLD VENIPUNCTURE: CPT

## 2024-02-21 PROCEDURE — 80061 LIPID PANEL: CPT

## 2024-02-21 PROCEDURE — 82550 ASSAY OF CK (CPK): CPT

## 2024-03-01 RX ORDER — FAMOTIDINE 20 MG/1
20 TABLET, FILM COATED ORAL 2 TIMES DAILY
Qty: 180 TABLET | Refills: 3 | Status: SHIPPED | OUTPATIENT
Start: 2024-03-01

## 2024-03-01 NOTE — TELEPHONE ENCOUNTER
Refill request is for a maintenance medication and has met the criteria specified in the Ambulatory Medication Refill Standing Order for eligibility, visits, laboratory, alerts and was sent to the requested pharmacy.    Requested Prescriptions     Signed Prescriptions Disp Refills    FAMOTIDINE 20 MG Oral Tab 180 tablet 3     Sig: TAKE 1 TABLET BY MOUTH TWICE A DAY     Authorizing Provider: GREGORIO CRUZ     Ordering User: TAMMY MCCALL

## 2024-03-04 ENCOUNTER — OFFICE VISIT (OUTPATIENT)
Dept: INTERNAL MEDICINE CLINIC | Facility: CLINIC | Age: 84
End: 2024-03-04

## 2024-03-04 VITALS
BODY MASS INDEX: 25.89 KG/M2 | OXYGEN SATURATION: 98 % | HEART RATE: 60 BPM | WEIGHT: 163 LBS | DIASTOLIC BLOOD PRESSURE: 60 MMHG | SYSTOLIC BLOOD PRESSURE: 150 MMHG | HEIGHT: 66.5 IN

## 2024-03-04 DIAGNOSIS — I35.0 AORTIC VALVE STENOSIS, ETIOLOGY OF CARDIAC VALVE DISEASE UNSPECIFIED: ICD-10-CM

## 2024-03-04 DIAGNOSIS — K21.9 GASTROESOPHAGEAL REFLUX DISEASE, UNSPECIFIED WHETHER ESOPHAGITIS PRESENT: ICD-10-CM

## 2024-03-04 DIAGNOSIS — G62.9 PERIPHERAL POLYNEUROPATHY: ICD-10-CM

## 2024-03-04 DIAGNOSIS — M48.061 SPINAL STENOSIS OF LUMBAR REGION, UNSPECIFIED WHETHER NEUROGENIC CLAUDICATION PRESENT: ICD-10-CM

## 2024-03-04 DIAGNOSIS — N40.1 BENIGN NON-NODULAR PROSTATIC HYPERPLASIA WITH LOWER URINARY TRACT SYMPTOMS: ICD-10-CM

## 2024-03-04 DIAGNOSIS — D69.6 THROMBOCYTOPENIA (HCC): ICD-10-CM

## 2024-03-04 DIAGNOSIS — Z00.00 ANNUAL PHYSICAL EXAM: ICD-10-CM

## 2024-03-04 DIAGNOSIS — I25.10 ASHD (ARTERIOSCLEROTIC HEART DISEASE): ICD-10-CM

## 2024-03-04 DIAGNOSIS — K58.9 IRRITABLE BOWEL SYNDROME WITHOUT DIARRHEA: ICD-10-CM

## 2024-03-04 DIAGNOSIS — E11.9 TYPE 2 DIABETES MELLITUS WITHOUT COMPLICATION, WITHOUT LONG-TERM CURRENT USE OF INSULIN (HCC): ICD-10-CM

## 2024-03-04 DIAGNOSIS — G89.29 CHRONIC BILATERAL LOW BACK PAIN WITHOUT SCIATICA: ICD-10-CM

## 2024-03-04 DIAGNOSIS — N18.31 STAGE 3A CHRONIC KIDNEY DISEASE (HCC): ICD-10-CM

## 2024-03-04 DIAGNOSIS — I10 ESSENTIAL HYPERTENSION: Primary | ICD-10-CM

## 2024-03-04 DIAGNOSIS — M54.50 CHRONIC BILATERAL LOW BACK PAIN WITHOUT SCIATICA: ICD-10-CM

## 2024-03-04 DIAGNOSIS — R53.83 FATIGUE, UNSPECIFIED TYPE: ICD-10-CM

## 2024-03-04 DIAGNOSIS — E78.00 HYPERCHOLESTEROLEMIA: ICD-10-CM

## 2024-03-04 DIAGNOSIS — E78.1 HYPERTRIGLYCERIDEMIA: ICD-10-CM

## 2024-03-04 DIAGNOSIS — I34.0 MITRAL VALVE INSUFFICIENCY, UNSPECIFIED ETIOLOGY: ICD-10-CM

## 2024-03-04 RX ORDER — DONEPEZIL HYDROCHLORIDE 10 MG/1
10 TABLET, FILM COATED ORAL NIGHTLY
Qty: 90 TABLET | Refills: 3 | Status: SHIPPED | OUTPATIENT
Start: 2024-03-04 | End: 2025-03-04

## 2024-03-04 RX ORDER — ATORVASTATIN CALCIUM 20 MG/1
20 TABLET, FILM COATED ORAL DAILY
COMMUNITY
Start: 2024-02-05

## 2024-03-04 NOTE — PROGRESS NOTES
Kashif Suero is a 83 year old male.  Chief Complaint   Patient presents with    Checkup     4 month     Hypertension    Hyperlipidemia    Arthritis    Diabetes     HPI:     Chief Complaint   Patient presents with    Checkup     4 month     Hypertension    Hyperlipidemia    Arthritis    Diabetes     Pt feels OK.  Pt has not been taking his Donepezil for a while.  Pt apparently did not receive or  tOK today.he refill in the past.  Pt was switched to Atorvastatin from Pravastatin in the recent past but pt does not know who did this(??Dr Rey).  Pt feels OK.  Pt is off of his Glimepiride but he is still on his Metformon.    Pt has occasional pain in his abdomen.  Pt is worried about his medications and whether or not he is taking the correct meds.  Patient has a heart murmur that is followed by cardiology.  Patient has been followed by Dr. Sen Rey but since he retired patient is now followed by Dr. Wade.   Patient had echocardiogram done last June which showed patient mainly has mitral insufficiency.  Patient's left ventricular function was good at that time.  Current Outpatient Medications   Medication Sig Dispense Refill    atorvastatin 20 MG Oral Tab Take 1 tablet (20 mg total) by mouth daily.      donepezil (ARICEPT) 10 MG Oral Tab Take 1 tablet (10 mg total) by mouth nightly. 90 tablet 3    FAMOTIDINE 20 MG Oral Tab TAKE 1 TABLET BY MOUTH TWICE A  tablet 3    zolpidem 10 MG Oral Tab TAKE 1 TABLET BY MOUTH NIGHTLY AS NEEDED FOR SLEEP 90 tablet 1    DULOXETINE 30 MG Oral Cap DR Particles TAKE 1 CAPSULE DAILY 90 capsule 3    ipratropium 0.06 % Nasal Solution 2 sprays by Nasal route 4 (four) times daily. 30 mL 3    AMLODIPINE 10 MG Oral Tab TAKE 1 TABLET DAILY 90 tablet 3    celecoxib 200 MG Oral Cap Take 1 capsule (200 mg total) by mouth daily. 90 capsule 3    METFORMIN HCL 1000 MG Oral Tab TAKE 1 TABLET TWICE DAILY  WITH MEALS 180 tablet 3    cetirizine 10 MG Oral Tab Take 1 tablet (10 mg  total) by mouth daily.      LOSARTAN 100 MG Oral Tab TAKE 1 TABLET DAILY 90 tablet 3    METOPROLOL SUCCINATE ER 25 MG Oral Tablet 24 Hr TAKE 1 TABLET DAILY 90 tablet 3    dutasteride 0.5 MG Oral Cap Take 1 capsule (0.5 mg total) by mouth daily. 90 capsule 3    TAMSULOSIN 0.4 MG Oral Cap TAKE 1 CAPSULE DAILY 90 capsule 3    Cholecalciferol (VITAMIN D3) 25 MCG (1000 UT) Oral Cap Take 1 tablet by mouth daily. 2000 units daily      LORAZEPAM 0.5 MG Oral Tab TAKE 1 TABLET BY MOUTH TWICE A DAY AS NEEDED 30 tablet 5    Coenzyme Q10 (CO Q-10) 200 MG Oral Cap Take 1 tablet by mouth daily.      aspirin 81 MG Oral Tab EC Take 1 tablet (81 mg total) by mouth daily.        Past Medical History:   Diagnosis Date    Allergic rhinitis     Cataract of both eyes 2014    Cholelithiasis     Depression     Elevated PSA     Hemorrhoids     High triglycerides     IBS (irritable bowel syndrome)     No diabetic retinopathy OU 2014    Osteoarthrosis, unspecified whether generalized or localized, unspecified site     Spinal stenosis     Torn tendon 2018    Right shoulder    Type II or unspecified type diabetes mellitus without mention of complication, not stated as uncontrolled     Unspecified essential hypertension 1988    Vitreous floaters of both eyes 2014      Social History:  Social History     Socioeconomic History    Marital status:    Tobacco Use    Smoking status: Never    Smokeless tobacco: Never   Vaping Use    Vaping Use: Never used   Substance and Sexual Activity    Alcohol use: Yes     Comment: Occasionally    Drug use: No   Other Topics Concern    Caffeine Concern Yes     Comment: Coffee 1 cup daily    Exercise Yes   Social History Narrative    The patient does not use an assistive device..      The patient does live in a home with stairs.        REVIEW OF SYSTEMS:   GENERAL HEALTH: feels well otherwise  RESPIRATORY:No cough or SOB  CARDIOVASCULAR: No chest pain  GI: No abdominal pain, nausea, vomiting, diarrhea, or  constipation  :No Urinary complaints  EXT:No complaints of pain or swelling in patient's legs    EXAM:   /60 (BP Location: Left arm, Patient Position: Sitting, Cuff Size: adult)   Pulse 60   Ht 5' 6.5\" (1.689 m)   Wt 163 lb (73.9 kg)   SpO2 98%   BMI 25.91 kg/m²   GENERAL: well developed, well nourished in no acute distress  HEENT: normal oropharynx.  Ears are normal. Eyes are normal  NECK: supple,no lymphadenopathy or masses, no bruits  CHEST: Well-developed male.  LUNGS: clear to auscultation  CARDIO: RRR, normal S1S2.  3/6 holosystolic murmur noted at the  LLSB and apex with radiation to patient's left axilla.  GI:Protuberant, BS are present, no organomegaly or palpable masses  EXTREMITIES: no edema  NEURO: alert and oriented  ASSESSMENT AND PLAN:   1. Essential hypertension  Patient appears stable today.  Patient had numerous issues regarding which medications he should be taking.  We did resolve these issues today.  Patient is to continue his current diet, medications and activity.  Patient will take atorvastatin 20 mg at night and not take pravastatin.  Patient will take donepezil 10 mg orally at night.   Patient will no longer take glimepiride but will continue to take metformin as he has been taking it, twice a day.  I will see the patient back in 3 months with blood tests and urinalysis for his annual physical examination.  The blood test will include a CBC, CMP, hemoglobin A1c, lipid panel, and TSH.  I will defer the patient's EKG to his cardiologist.    2. Hypercholesterolemia  Doing well.  CPM.  Patient's recent lipid panel had a cholesterol of 110, triglycerides were 78, HDL cholesterol is 31 and LDL cholesterol was 63.  Patient's AST was 26 and ALT was 17.  CPM.    3. Hypertriglyceridemia  Doing well.  CPM.  Patient's recent triglyceride reading was 78.  As above.    4. Type 2 diabetes mellitus without complication, without long-term current use of insulin (HCC)  Doing well.  CPM.   Patient's glimepiride has been stopped at his last visit.  Patient is not taking metformin at 1000 mg orally twice daily.  Patient's recent .  His hemoglobin A1c was 6.3.  CPM.  I will see the patient back in 3 months with blood test as noted above.    5. ASHD (arteriosclerotic heart disease)  Stable.  CPM.      6. Mitral valve insufficiency, unspecified etiology  Patient is a mitral insufficiency heart murmur.  Patient is followed by his cardiologist concerning this.  Patient previously saw Dr. Sen Rey.  Patient is now seeing Dr. Wade.    7. Aortic valve stenosis, etiology of cardiac valve disease unspecified  Stable.  CPM.  Patient's main heart murmur currently is his mitral insufficiency murmur.    8. Peripheral polyneuropathy  Stable.  CPM.    9. Gastroesophageal reflux disease, unspecified whether esophagitis present  Stable.  CPM.    10. Irritable bowel syndrome without diarrhea  Stable.  CPM.    11. Spinal stenosis of lumbar region, unspecified whether neurogenic claudication present  Stable.  CPM.    12. Benign non-nodular prostatic hyperplasia with lower urinary tract symptoms  Stable.  CPM.    13. Fatigue, unspecified type  Stable.  CPM.    14. Stage 3a chronic kidney disease (HCC)  Doing well.  CPM.  Patient's recent BMP had a BUN of 13, creatinine 1.07 and GFR of 68.  CPM.    15. Thrombocytopenia (HCC)  Patient's recent CBC had a hemoglobin of 12.9, hematocrit 38.8 and WBC of 7800.  Patient's platelet count was 138,000 which is improved from before but still low.    16. Chronic bilateral low back pain without sciatica  Stable.  CPM.      The patient indicates understanding of these issues and agrees to the plan.  The patient is asked to return in 3 months with blood tests and urinalysis for his annual physical examination as noted above..    Mahesh Mendiola MD  3/4/2024  10:50 AM

## 2024-03-04 NOTE — PATIENT INSTRUCTIONS
Patient is to continue his current diet, medication and activity.  Patient is to continue his medications as discussed today.  This will include taking Aricept/donepezil 10 mg orally nightly.  Patient will also continue to take Lipitor/atorvastatin 20 mg orally nightly.  Patient's pravastatin has been stopped.  Patient's glimepiride has also been stopped.  Patient will follow-up with Dr. Wade from cardiology regarding his mitral insufficiency murmur.  I will see the patient back in 3 months, June, for his Medicare annual physical examination with blood tests and urinalysis.  I will defer his EKG to cardiology.  Blood test will include a CBC, CMP, hemoglobin A1c, lipid panel, TSH and urinalysis.  I will see the patient back sooner as necessary.

## 2024-03-07 RX ORDER — TAMSULOSIN HYDROCHLORIDE 0.4 MG/1
CAPSULE ORAL
Qty: 90 CAPSULE | Refills: 3 | Status: SHIPPED | OUTPATIENT
Start: 2024-03-07

## 2024-03-07 NOTE — TELEPHONE ENCOUNTER
Refill request is for a maintenance medication and has met the criteria specified in the Ambulatory Medication Refill Standing Order for eligibility, visits, laboratory, alerts and was sent to the requested pharmacy.    Requested Prescriptions     Signed Prescriptions Disp Refills    TAMSULOSIN 0.4 MG Oral Cap 90 capsule 3     Sig: TAKE 1 CAPSULE DAILY     Authorizing Provider: GREGORIO CRUZ     Ordering User: DANIELLE MASTERS

## 2024-03-21 RX ORDER — DUTASTERIDE 0.5 MG/1
0.5 CAPSULE, LIQUID FILLED ORAL DAILY
Qty: 90 CAPSULE | Refills: 3 | Status: SHIPPED | OUTPATIENT
Start: 2024-03-21

## 2024-03-21 NOTE — TELEPHONE ENCOUNTER
Refill request is for a maintenance medication and has met the criteria specified in the Ambulatory Medication Refill Standing Order for eligibility, visits, laboratory, alerts and was sent to the requested pharmacy.    Requested Prescriptions     Signed Prescriptions Disp Refills    dutasteride 0.5 MG Oral Cap 90 capsule 3     Sig: Take 1 capsule (0.5 mg total) by mouth daily.     Authorizing Provider: GREGORIO CRUZ     Ordering User: DHRUV JARRETT

## 2024-04-26 NOTE — PROCEDURE: SUPERFICIAL RADIATION TREATMENT
Nurses note reviewed and agreed with.  Patient presents today with a CC of long, painful nails on the patient's feet. Due to the patient's medical condition, the patient can no longer trim the nails, and the condition causes pain upon ambulation.  No new foot issues to report today.    Peripheral vascular exam reveals non-palpable pt pulses, hair atrophy, thin/shiny skin, thickened nails, skin rubor/redness, cool extremities, edema. The hallux nails are thickened and discolored with subungual debris.  The rest of the nails are elongated and painful only (non dystrophic nails).  No calluses or areas of rubbing or pressure on either foot.  The right third toe is a rigid hammer toe.    Diagnosis is generalized atherosclerosis, right hammer toe, dermatophytosis of the nails, and non dystrophic nails with pain.    The affected B/L hallux nails were all trimmed and debrided using a nipper and marcus.  The non dystrophic nails (B/L 2-5) were also trimmed using a nipper and marcus.  There were no cuts.   No treatment needed for the right hammer toe.  Patient is instructed to RTC in 2 months.   Field Size (Applicator): 1.5 cm

## 2024-04-30 RX ORDER — METOPROLOL SUCCINATE 25 MG/1
TABLET, EXTENDED RELEASE ORAL
Qty: 90 TABLET | Refills: 3 | Status: SHIPPED | OUTPATIENT
Start: 2024-04-30

## 2024-04-30 NOTE — TELEPHONE ENCOUNTER
Refill request is for a maintenance medication and has met the criteria specified in the Ambulatory Medication Refill Standing Order for eligibility, visits, laboratory, alerts and was sent to the requested pharmacy.    Requested Prescriptions     Signed Prescriptions Disp Refills    METOPROLOL SUCCINATE ER 25 MG Oral Tablet 24 Hr 90 tablet 3     Sig: TAKE 1 TABLET DAILY     Authorizing Provider: GREGORIO CRUZ     Ordering User: FABIANO DENTON

## 2024-05-10 RX ORDER — LOSARTAN POTASSIUM 100 MG/1
TABLET ORAL
Qty: 90 TABLET | Refills: 3 | Status: SHIPPED | OUTPATIENT
Start: 2024-05-10

## 2024-05-10 NOTE — TELEPHONE ENCOUNTER
Refill request is for a maintenance medication and has met the criteria specified in the Ambulatory Medication Refill Standing Order for eligibility, visits, laboratory, alerts and was sent to the requested pharmacy.    Requested Prescriptions     Signed Prescriptions Disp Refills    LOSARTAN 100 MG Oral Tab 90 tablet 3     Sig: TAKE 1 TABLET DAILY     Authorizing Provider: GREGORIO CRUZ     Ordering User: FABIANO DENTON

## 2024-05-20 RX ORDER — IPRATROPIUM BROMIDE 42 UG/1
2 SPRAY, METERED NASAL 4 TIMES DAILY
Qty: 30 ML | Refills: 3 | Status: SHIPPED | OUTPATIENT
Start: 2024-05-20

## 2024-05-20 NOTE — TELEPHONE ENCOUNTER
Refill request is for a maintenance medication and has met the criteria specified in the Ambulatory Medication Refill Standing Order for eligibility, visits, laboratory, alerts and was sent to the requested pharmacy.    Requested Prescriptions     Signed Prescriptions Disp Refills    IPRATROPIUM 0.06 % Nasal Solution 30 mL 3     Sig: USE 2 SPRAYS NASALLY 4     TIMES DAILY     Authorizing Provider: GREGORIO CRUZ     Ordering User: ILEANA IRVING

## 2024-06-17 ENCOUNTER — LAB ENCOUNTER (OUTPATIENT)
Dept: LAB | Age: 84
End: 2024-06-17
Attending: INTERNAL MEDICINE

## 2024-06-17 DIAGNOSIS — E11.9 TYPE 2 DIABETES MELLITUS WITHOUT COMPLICATION, WITHOUT LONG-TERM CURRENT USE OF INSULIN (HCC): ICD-10-CM

## 2024-06-17 DIAGNOSIS — N18.31 STAGE 3A CHRONIC KIDNEY DISEASE (HCC): ICD-10-CM

## 2024-06-17 DIAGNOSIS — E78.00 HYPERCHOLESTEROLEMIA: ICD-10-CM

## 2024-06-17 DIAGNOSIS — R53.83 FATIGUE, UNSPECIFIED TYPE: ICD-10-CM

## 2024-06-17 DIAGNOSIS — D69.6 THROMBOCYTOPENIA (HCC): ICD-10-CM

## 2024-06-17 DIAGNOSIS — I10 ESSENTIAL HYPERTENSION: ICD-10-CM

## 2024-06-17 DIAGNOSIS — Z00.00 ANNUAL PHYSICAL EXAM: ICD-10-CM

## 2024-06-17 LAB
ALBUMIN SERPL-MCNC: 4.1 G/DL (ref 3.2–4.8)
ALBUMIN/GLOB SERPL: 1.8 {RATIO} (ref 1–2)
ALP LIVER SERPL-CCNC: 91 U/L
ALT SERPL-CCNC: 9 U/L
ANION GAP SERPL CALC-SCNC: 5 MMOL/L (ref 0–18)
AST SERPL-CCNC: 19 U/L (ref ?–34)
BASOPHILS # BLD AUTO: 0 X10(3) UL (ref 0–0.2)
BASOPHILS NFR BLD AUTO: 0 %
BILIRUB SERPL-MCNC: 0.5 MG/DL (ref 0.2–1.1)
BILIRUB UR QL: NEGATIVE
BUN BLD-MCNC: 11 MG/DL (ref 9–23)
BUN/CREAT SERPL: 10 (ref 10–20)
CALCIUM BLD-MCNC: 9.5 MG/DL (ref 8.7–10.4)
CHLORIDE SERPL-SCNC: 106 MMOL/L (ref 98–112)
CHOLEST SERPL-MCNC: 112 MG/DL (ref ?–200)
CLARITY UR: CLEAR
CO2 SERPL-SCNC: 30 MMOL/L (ref 21–32)
CREAT BLD-MCNC: 1.1 MG/DL
DEPRECATED RDW RBC AUTO: 40.4 FL (ref 35.1–46.3)
EGFRCR SERPLBLD CKD-EPI 2021: 66 ML/MIN/1.73M2 (ref 60–?)
EOSINOPHIL # BLD AUTO: 0.17 X10(3) UL (ref 0–0.7)
EOSINOPHIL NFR BLD AUTO: 2.4 %
ERYTHROCYTE [DISTWIDTH] IN BLOOD BY AUTOMATED COUNT: 12.9 % (ref 11–15)
EST. AVERAGE GLUCOSE BLD GHB EST-MCNC: 126 MG/DL (ref 68–126)
FASTING PATIENT LIPID ANSWER: YES
FASTING STATUS PATIENT QL REPORTED: YES
GLOBULIN PLAS-MCNC: 2.3 G/DL (ref 2–3.5)
GLUCOSE BLD-MCNC: 102 MG/DL (ref 70–99)
GLUCOSE UR-MCNC: NORMAL MG/DL
HBA1C MFR BLD: 6 % (ref ?–5.7)
HCT VFR BLD AUTO: 32.1 %
HDLC SERPL-MCNC: 30 MG/DL (ref 40–59)
HGB BLD-MCNC: 10.5 G/DL
HGB UR QL STRIP.AUTO: NEGATIVE
IMM GRANULOCYTES # BLD AUTO: 0.02 X10(3) UL (ref 0–1)
IMM GRANULOCYTES NFR BLD: 0.3 %
KETONES UR-MCNC: NEGATIVE MG/DL
LDLC SERPL CALC-MCNC: 66 MG/DL (ref ?–100)
LEUKOCYTE ESTERASE UR QL STRIP.AUTO: NEGATIVE
LYMPHOCYTES # BLD AUTO: 2.55 X10(3) UL (ref 1–4)
LYMPHOCYTES NFR BLD AUTO: 36.3 %
MCH RBC QN AUTO: 28.1 PG (ref 26–34)
MCHC RBC AUTO-ENTMCNC: 32.7 G/DL (ref 31–37)
MCV RBC AUTO: 85.8 FL
MONOCYTES # BLD AUTO: 0.61 X10(3) UL (ref 0.1–1)
MONOCYTES NFR BLD AUTO: 8.7 %
NEUTROPHILS # BLD AUTO: 3.67 X10 (3) UL (ref 1.5–7.7)
NEUTROPHILS # BLD AUTO: 3.67 X10(3) UL (ref 1.5–7.7)
NEUTROPHILS NFR BLD AUTO: 52.3 %
NITRITE UR QL STRIP.AUTO: NEGATIVE
NONHDLC SERPL-MCNC: 82 MG/DL (ref ?–130)
OSMOLALITY SERPL CALC.SUM OF ELEC: 292 MOSM/KG (ref 275–295)
PH UR: 7 [PH] (ref 5–8)
PLATELET # BLD AUTO: 129 10(3)UL (ref 150–450)
POTASSIUM SERPL-SCNC: 4.2 MMOL/L (ref 3.5–5.1)
PROT SERPL-MCNC: 6.4 G/DL (ref 5.7–8.2)
PROT UR-MCNC: NEGATIVE MG/DL
RBC # BLD AUTO: 3.74 X10(6)UL
SODIUM SERPL-SCNC: 141 MMOL/L (ref 136–145)
SP GR UR STRIP: 1.01 (ref 1–1.03)
TRIGL SERPL-MCNC: 77 MG/DL (ref 30–149)
TSI SER-ACNC: 2.22 MIU/ML (ref 0.55–4.78)
UROBILINOGEN UR STRIP-ACNC: NORMAL
VLDLC SERPL CALC-MCNC: 12 MG/DL (ref 0–30)
WBC # BLD AUTO: 7 X10(3) UL (ref 4–11)

## 2024-06-17 PROCEDURE — 83036 HEMOGLOBIN GLYCOSYLATED A1C: CPT

## 2024-06-17 PROCEDURE — 81003 URINALYSIS AUTO W/O SCOPE: CPT | Performed by: INTERNAL MEDICINE

## 2024-06-17 PROCEDURE — 80053 COMPREHEN METABOLIC PANEL: CPT

## 2024-06-17 PROCEDURE — 85025 COMPLETE CBC W/AUTO DIFF WBC: CPT

## 2024-06-17 PROCEDURE — 36415 COLL VENOUS BLD VENIPUNCTURE: CPT

## 2024-06-17 PROCEDURE — 80061 LIPID PANEL: CPT

## 2024-06-17 PROCEDURE — 84443 ASSAY THYROID STIM HORMONE: CPT

## 2024-06-19 ENCOUNTER — OFFICE VISIT (OUTPATIENT)
Dept: INTERNAL MEDICINE CLINIC | Facility: CLINIC | Age: 84
End: 2024-06-19

## 2024-06-19 ENCOUNTER — LAB ENCOUNTER (OUTPATIENT)
Dept: LAB | Age: 84
End: 2024-06-19
Attending: INTERNAL MEDICINE

## 2024-06-19 VITALS
OXYGEN SATURATION: 99 % | BODY MASS INDEX: 24.64 KG/M2 | WEIGHT: 157 LBS | SYSTOLIC BLOOD PRESSURE: 150 MMHG | TEMPERATURE: 98 F | DIASTOLIC BLOOD PRESSURE: 60 MMHG | HEIGHT: 67 IN | HEART RATE: 64 BPM

## 2024-06-19 DIAGNOSIS — I10 ESSENTIAL HYPERTENSION: ICD-10-CM

## 2024-06-19 DIAGNOSIS — D64.9 ANEMIA, UNSPECIFIED TYPE: ICD-10-CM

## 2024-06-19 DIAGNOSIS — D69.6 THROMBOCYTOPENIA (HCC): ICD-10-CM

## 2024-06-19 DIAGNOSIS — M48.061 SPINAL STENOSIS OF LUMBAR REGION, UNSPECIFIED WHETHER NEUROGENIC CLAUDICATION PRESENT: ICD-10-CM

## 2024-06-19 DIAGNOSIS — Z00.00 ANNUAL PHYSICAL EXAM: Primary | ICD-10-CM

## 2024-06-19 DIAGNOSIS — I25.10 ASHD (ARTERIOSCLEROTIC HEART DISEASE): ICD-10-CM

## 2024-06-19 DIAGNOSIS — R63.4 WEIGHT LOSS: ICD-10-CM

## 2024-06-19 DIAGNOSIS — R63.0 ANOREXIA: ICD-10-CM

## 2024-06-19 DIAGNOSIS — I34.0 MITRAL VALVE INSUFFICIENCY, UNSPECIFIED ETIOLOGY: ICD-10-CM

## 2024-06-19 DIAGNOSIS — R53.83 FATIGUE, UNSPECIFIED TYPE: ICD-10-CM

## 2024-06-19 DIAGNOSIS — G89.29 CHRONIC BILATERAL LOW BACK PAIN WITHOUT SCIATICA: ICD-10-CM

## 2024-06-19 DIAGNOSIS — M54.50 CHRONIC BILATERAL LOW BACK PAIN WITHOUT SCIATICA: ICD-10-CM

## 2024-06-19 DIAGNOSIS — K21.9 GASTROESOPHAGEAL REFLUX DISEASE, UNSPECIFIED WHETHER ESOPHAGITIS PRESENT: ICD-10-CM

## 2024-06-19 DIAGNOSIS — E78.00 HYPERCHOLESTEROLEMIA: ICD-10-CM

## 2024-06-19 DIAGNOSIS — E78.1 HYPERTRIGLYCERIDEMIA: ICD-10-CM

## 2024-06-19 DIAGNOSIS — E11.9 TYPE 2 DIABETES MELLITUS WITHOUT COMPLICATION, WITHOUT LONG-TERM CURRENT USE OF INSULIN (HCC): ICD-10-CM

## 2024-06-19 DIAGNOSIS — N40.2 PROSTATE NODULE: ICD-10-CM

## 2024-06-19 DIAGNOSIS — K58.9 IRRITABLE BOWEL SYNDROME WITHOUT DIARRHEA: ICD-10-CM

## 2024-06-19 DIAGNOSIS — G62.9 PERIPHERAL POLYNEUROPATHY: ICD-10-CM

## 2024-06-19 DIAGNOSIS — N40.1 BENIGN NON-NODULAR PROSTATIC HYPERPLASIA WITH LOWER URINARY TRACT SYMPTOMS: ICD-10-CM

## 2024-06-19 DIAGNOSIS — I35.0 AORTIC VALVE STENOSIS, ETIOLOGY OF CARDIAC VALVE DISEASE UNSPECIFIED: ICD-10-CM

## 2024-06-19 DIAGNOSIS — N18.31 STAGE 3A CHRONIC KIDNEY DISEASE (HCC): ICD-10-CM

## 2024-06-19 LAB
ATRIAL RATE: 65 BPM
BASOPHILS # BLD AUTO: 0.01 X10(3) UL (ref 0–0.2)
BASOPHILS NFR BLD AUTO: 0.1 %
DEPRECATED HBV CORE AB SER IA-ACNC: 9.4 NG/ML
DEPRECATED RDW RBC AUTO: 41.1 FL (ref 35.1–46.3)
EOSINOPHIL # BLD AUTO: 0.17 X10(3) UL (ref 0–0.7)
EOSINOPHIL NFR BLD AUTO: 2 %
ERYTHROCYTE [DISTWIDTH] IN BLOOD BY AUTOMATED COUNT: 13.2 % (ref 11–15)
HCT VFR BLD AUTO: 35.4 %
HGB BLD-MCNC: 11 G/DL
IMM GRANULOCYTES # BLD AUTO: 0.01 X10(3) UL (ref 0–1)
IMM GRANULOCYTES NFR BLD: 0.1 %
IRON SATN MFR SERPL: 6 %
IRON SERPL-MCNC: 24 UG/DL
LYMPHOCYTES # BLD AUTO: 1.93 X10(3) UL (ref 1–4)
LYMPHOCYTES NFR BLD AUTO: 22.7 %
MCH RBC QN AUTO: 27 PG (ref 26–34)
MCHC RBC AUTO-ENTMCNC: 31.1 G/DL (ref 31–37)
MCV RBC AUTO: 87 FL
MONOCYTES # BLD AUTO: 0.72 X10(3) UL (ref 0.1–1)
MONOCYTES NFR BLD AUTO: 8.5 %
NEUTROPHILS # BLD AUTO: 5.66 X10 (3) UL (ref 1.5–7.7)
NEUTROPHILS # BLD AUTO: 5.66 X10(3) UL (ref 1.5–7.7)
NEUTROPHILS NFR BLD AUTO: 66.6 %
P AXIS: 12 DEGREES
P-R INTERVAL: 168 MS
PERFORMANCE MONITORS CORRECT (YES/NO): YES YES/NO
PLATELET # BLD AUTO: 154 10(3)UL (ref 150–450)
PSA SERPL-MCNC: 7.5 NG/ML (ref ?–4)
Q-T INTERVAL: 420 MS
QRS DURATION: 160 MS
QTC CALCULATION (BEZET): 436 MS
R AXIS: 81 DEGREES
RBC # BLD AUTO: 4.07 X10(6)UL
T AXIS: 10 DEGREES
TIBC SERPL-MCNC: 435 UG/DL (ref 250–425)
TRANSFERRIN SERPL-MCNC: 292 MG/DL (ref 215–365)
VENTRICULAR RATE: 65 BPM
VIT B12 SERPL-MCNC: 221 PG/ML (ref 211–911)
WBC # BLD AUTO: 8.5 X10(3) UL (ref 4–11)

## 2024-06-19 PROCEDURE — 99499 UNLISTED E&M SERVICE: CPT | Performed by: INTERNAL MEDICINE

## 2024-06-19 PROCEDURE — 83540 ASSAY OF IRON: CPT

## 2024-06-19 PROCEDURE — 36415 COLL VENOUS BLD VENIPUNCTURE: CPT

## 2024-06-19 PROCEDURE — 84153 ASSAY OF PSA TOTAL: CPT

## 2024-06-19 PROCEDURE — 82746 ASSAY OF FOLIC ACID SERUM: CPT

## 2024-06-19 PROCEDURE — 82270 OCCULT BLOOD FECES: CPT | Performed by: INTERNAL MEDICINE

## 2024-06-19 PROCEDURE — 82607 VITAMIN B-12: CPT

## 2024-06-19 PROCEDURE — 93000 ELECTROCARDIOGRAM COMPLETE: CPT | Performed by: INTERNAL MEDICINE

## 2024-06-19 PROCEDURE — 85025 COMPLETE CBC W/AUTO DIFF WBC: CPT

## 2024-06-19 PROCEDURE — G0439 PPPS, SUBSEQ VISIT: HCPCS | Performed by: INTERNAL MEDICINE

## 2024-06-19 PROCEDURE — 82728 ASSAY OF FERRITIN: CPT

## 2024-06-19 PROCEDURE — 84466 ASSAY OF TRANSFERRIN: CPT

## 2024-06-19 PROCEDURE — 99214 OFFICE O/P EST MOD 30 MIN: CPT | Performed by: INTERNAL MEDICINE

## 2024-06-19 PROCEDURE — 96160 PT-FOCUSED HLTH RISK ASSMT: CPT | Performed by: INTERNAL MEDICINE

## 2024-06-19 NOTE — PATIENT INSTRUCTIONS
Patient is doing poorly with new onset anemia, weight loss, anorexia,, low back pain and prostate nodule.  I will place an order for blood test that will include a CBC, iron, iron-binding capacity, ferritin level, vitamin B12 level and folic acid level.  I will also order a diagnostic PSA.  I will place an order in the system for the patient to have a CT scan of his abdomen and pelvis due to the above diagnoses.  Patient may well need a GI evaluation.  I will have the patient return in about 2 to 3 weeks and see Dr. Barnett at that time.

## 2024-06-20 ENCOUNTER — TELEPHONE (OUTPATIENT)
Dept: INTERNAL MEDICINE CLINIC | Facility: CLINIC | Age: 84
End: 2024-06-20

## 2024-06-20 LAB — FOLATE SERPL-MCNC: 5.4 NG/ML (ref 8.7–?)

## 2024-06-20 NOTE — TELEPHONE ENCOUNTER
Wife requests call back myles Mendiola   to discuss her husbands blood test results & areas of concerns    She will be home this evening after 8:30 pm or tomorrow any time    [phone]

## 2024-06-20 NOTE — PROGRESS NOTES
Kashif Suero is a 83 year old male who was seen by me on June 19, 2024 for his Medicare advantage annual physical examination.  HPI:   Mr. Kashif Suero is an 83-year-old white male who was seen by me on June 19, 2024 for his Medicare advantage annual physical examination.  At the time of the examination Mr. Suero had numerous issues.  He initially felt that his memory issues were his biggest problem.  He notes that he has difficulty finding the correct words when having conversations.  He also forgets some of his cousins names.  In addition to this patient complains of having a stomachache daily especially in the low mid abdomen the last for 3 to 4 hours as far as he can relate is not related to being constipated.  Patient also complains of much pain in the lower back in the lower lumbar region and also in the upper back region.  In addition his both of his feet hurt.  Patient complains of having a poor appetite he also notes he does not have any taste.  He is not thirsty.  His 20 teeth are worn down and is not able to eat because of that.  Patient is losing weight.  Patient has lost 6 pounds since he was seen on March 3.  Patient previously weighed in the range of 205 pounds and is now down to 157.  Patient cannot walk more than 4 blocks before he has to rest due to pain in his legs.  Patient does note that he does have to get up as often as 4 times a night to urinate.  Patient was also found to have a significant anemia on today's lab test.  His CBC has shown that his hemoglobin is fallen to 10.5 and hematocrit 32.1.  WBC is 7000 and platelet count is 129,000 which is stable.Patient has had the thrombocytopenia in the past but the anemia is not is a new issue.  Patient denies any nausea or vomiting.  He also denies any change in his bowel habits.  He does not have any rectal bleeding.  Patient is weak.    Wt Readings from Last 6 Encounters:   06/19/24 157 lb (71.2 kg)   03/04/24 163 lb (73.9 kg)    11/01/23 172 lb (78 kg)   06/26/23 181 lb (82.1 kg)   03/09/23 179 lb (81.2 kg)   08/03/22 182 lb 6.4 oz (82.7 kg)     Body mass index is 24.59 kg/m².     Current Outpatient Medications   Medication Sig Dispense Refill    IPRATROPIUM 0.06 % Nasal Solution USE 2 SPRAYS NASALLY 4     TIMES DAILY 30 mL 3    LOSARTAN 100 MG Oral Tab TAKE 1 TABLET DAILY 90 tablet 3    METOPROLOL SUCCINATE ER 25 MG Oral Tablet 24 Hr TAKE 1 TABLET DAILY 90 tablet 3    dutasteride 0.5 MG Oral Cap Take 1 capsule (0.5 mg total) by mouth daily. 90 capsule 3    TAMSULOSIN 0.4 MG Oral Cap TAKE 1 CAPSULE DAILY 90 capsule 3    atorvastatin 20 MG Oral Tab Take 1 tablet (20 mg total) by mouth daily.      donepezil (ARICEPT) 10 MG Oral Tab Take 1 tablet (10 mg total) by mouth nightly. 90 tablet 3    FAMOTIDINE 20 MG Oral Tab TAKE 1 TABLET BY MOUTH TWICE A  tablet 3    zolpidem 10 MG Oral Tab TAKE 1 TABLET BY MOUTH NIGHTLY AS NEEDED FOR SLEEP 90 tablet 1    DULOXETINE 30 MG Oral Cap DR Particles TAKE 1 CAPSULE DAILY 90 capsule 3    AMLODIPINE 10 MG Oral Tab TAKE 1 TABLET DAILY 90 tablet 3    celecoxib 200 MG Oral Cap Take 1 capsule (200 mg total) by mouth daily. 90 capsule 3    METFORMIN HCL 1000 MG Oral Tab TAKE 1 TABLET TWICE DAILY  WITH MEALS 180 tablet 3    cetirizine 10 MG Oral Tab Take 1 tablet (10 mg total) by mouth daily.      Cholecalciferol (VITAMIN D3) 25 MCG (1000 UT) Oral Cap Take 1 tablet by mouth daily. 2000 units daily      LORAZEPAM 0.5 MG Oral Tab TAKE 1 TABLET BY MOUTH TWICE A DAY AS NEEDED 30 tablet 5    Coenzyme Q10 (CO Q-10) 200 MG Oral Cap Take 1 tablet by mouth daily.      aspirin 81 MG Oral Tab EC Take 1 tablet (81 mg total) by mouth daily.        Past Medical History:    Allergic rhinitis    Cataract of both eyes    Cholelithiasis    Depression    Elevated PSA    Hemorrhoids    High triglycerides    IBS (irritable bowel syndrome)    No diabetic retinopathy OU    Osteoarthrosis, unspecified whether generalized or  localized, unspecified site    Spinal stenosis    Torn tendon    Right shoulder    Type II or unspecified type diabetes mellitus without mention of complication, not stated as uncontrolled    Unspecified essential hypertension    Vitreous floaters of both eyes      Past Surgical History:   Procedure Laterality Date    Angioplasty (coronary)  01/01/2011    + stent(s)    Cataract extraction w/  intraocular lens implant Right 03/16/2023    Dr. Gilmore done @ [facility]      Family History   Problem Relation Age of Onset    Heart Disease Father     Stroke Mother     Pulmonary Disease Brother     Diabetes Brother     Heart Attack Daughter 55    Glaucoma Neg     Macular degeneration Neg       Social History:  Social History     Socioeconomic History    Marital status:    Tobacco Use    Smoking status: Never    Smokeless tobacco: Never   Vaping Use    Vaping status: Never Used   Substance and Sexual Activity    Alcohol use: Yes     Comment: rarely    Drug use: No   Other Topics Concern    Caffeine Concern Yes     Comment: Coffee 1 cup daily    Exercise Yes   Social History Narrative    The patient does not use an assistive device..      The patient does live in a home with stairs.           REVIEW OF SYSTEMS:   GENERAL: feels well   EYES:denies blurred vision or double vision  HEENT: denies nasal congestion, sinus pain or ST  LUNGS: denies shortness of breath or cough  CARDIOVASCULAR: denies chest pain or pressure or palpitations  GI: denies abdominal pain, N/V, diarrhea, constipation, hematochezia or melena  : No urinary complaints  NEURO: denies headaches or dizziness    EXAM:   /60 (BP Location: Left arm, Patient Position: Sitting, Cuff Size: adult)   Pulse 64   Temp 97.6 °F (36.4 °C)   Ht 5' 7\" (1.702 m)   Wt 157 lb (71.2 kg)   SpO2 99%   BMI 24.59 kg/m²   GENERAL: Thin white male in no acute distress  SKIN: no rashes,no suspicious lesions  HEENT: atraumatic, normocephalic, normal oropharynx, ears appear  normal, normal TM's  EYES:PERRLA, EOMI, conjunctivae pink, sclerae are nonicteric  NECK: supple,no cervical or supraclavicular lymphadenopathy or palpable masses, bilateral carotid bruits appear to be transmitted from the heart.   CHEST: no chest tenderness  LUNGS: clear to auscultation  CARDIO: RRR, normal S1S2, 3/6 ESM noted at the second ICS with radiation to patient's carotid arteries.  Patient also has a 3/6 holosystolic murmur noted at the apex with radiation to the axilla.  GI:Abdomen is protuberant, BS are present, no masses or organomegaly  :Normal male, No hernia noted  RECTAL:  Stool is brown and is negative for Occult blood.  Prostate appears normal sized which appears to have a large nodule on the left lobe of the prostate.  MUSCULOSKELETAL: back is not tender.  No pain or swelling of legs  EXTREMITIES:No edema.  All peripheral pulses are intact.  NEURO:Alert and oriented, CN are intact, DTRs are 1+ Bilaterally.    Patient's EKG has an NSR of 65 bpm.  Its axis is an 81 degree angle.  EKG has a right bundle branch block.  Patient's EKG is similar to patient's previous EKGs.    Patient CBC had a hemoglobin of 10.5, hematocrit 32.1, WBC of 7000 and a platelet count 129,000.  Patient's FBS was 102.  His hemoglobin A1c was 6.0.  The remainder of his CMP appeared normal.  Patient's urinalysis appeared normal.  Patient's lipid panel had a cholesterol of 112, triglycerides were 77, HDL cholesterol was 30 and LDL cholesterol was 66.  Patient's TSH was 2.224.    ASSESSMENT AND PLAN:   1. Annual physical exam  Patient appears to be weaker than in the past and appears to be ill.  Patient complains of low back pain.  He has a new onset anemia.  His thrombocytopenia is stable.  Patient has lost 6 pounds since he was here in March.  He previously lost weight earlier.  Patient previously weighed in the range of 200 to 205 pounds.  He is now 957 pounds.  Patient has a large prostate nodule in the left lobe of the  prostate.  Patient's stool was negative for occult blood.  I am concerned the patient may have an occult malignancy.  My initial concern was that the patient may have prostate carcinoma.  Patient's CMP shows his alkaline phosphatase to be normal.  Patient does have a significant anemia.  Patient complains of abdominal pain.  I am concerned about either prostate carcinoma or some intra-abdominal process.  I have placed orders in the system for the patient to have blood test which will include a CBC, iron, iron-binding capacity, ferritin level, vitamin B12 level and folic acid level.  I will also obtain a diagnostic PSA.  I have also placed orders in the system for the patient have a CT scan of his abdomen and pelvis to evaluate his numerous issues including abdominal pain, weight loss, loss of appetite and nausea.  Patient may well require a GI evaluation also but so far his stools are negative for occult blood.  I will await to see on these test turn out.  Patient return in about 2 to 3 weeks and see Dr. Barnett at that time.    - EKG In-Office [[id number]]    2. Essential hypertension  Patient's blood pressure is slightly elevated but is stable today.    3. Hypercholesterolemia  Patient's recent lipid panel shows his cholesterol to be 112, triglycerides to be to be 77, his HDL cholesterol to be 30 and his LDL cholesterol to be 66.  Patient's AST is 19 and ALT is 9.  Will follow-up on this in the future.    4. Hypertriglyceridemia  Stable.  CPM.  Patient's recent triglyceride reading was 77.    5. ASHD (arteriosclerotic heart disease)  Stable.  CPM.  - EKG In-Office [[id number]]    6. Type 2 diabetes mellitus without complication, without long-term current use of insulin (HCC)  Stable.  CPM.  Patient's recent FBS was 102.  His hemoglobin A1c was 6.0.    7. Mitral valve insufficiency, unspecified etiology  Patient is a murmur of mitral insufficiency.    8. Aortic valve stenosis, etiology of cardiac valve disease  unspecified  Patient has a murmur of aortic stenosis.    9. Peripheral polyneuropathy  Stable.  CPM.    10. Gastroesophageal reflux disease, unspecified whether esophagitis present  Stable.  CPM.    11. Irritable bowel syndrome without diarrhea  Stable.  CPM.    12. Spinal stenosis of lumbar region, unspecified whether neurogenic claudication present  Stable.  CPM.    13. Chronic bilateral low back pain without sciatica  Patient complains of much pain in his low back region.  He also has some pain in the upper back region.    14. Benign non-nodular prostatic hyperplasia with lower urinary tract symptoms  Patient now appears to have a nodule involving the left lobe of the prostate which appears large.    15. Fatigue, unspecified type  Patient appears to be fatigued and much weaker than before.    - PSA - Diagnostic [E]; Future  - CT ABDOMEN+PELVIS (W AND W/O CONTRAST)(CPT=74178); Future    16. Stage 3a chronic kidney disease (HCC)  Patient's recent CMP has a BUN of 11 and creatinine 1.1 and a GFR of 66.  Patient's renal function is normal today.    17. Thrombocytopenia (HCC)  Patient is a thrombocytopenia with a platelet count of 129,000.  This is stable.  - CT ABDOMEN+PELVIS (W AND W/O CONTRAST)(CPT=74178); Future    18. Anemia, unspecified type  Patient has a new onset anemia that has been gradually getting worse.  Patient's recent CBC showed the hemoglobin to be 10.5, hematocrit 32.1 and WBC to be 7000 with a platelet count of 120,000.  Patient will be seen back in about 2 to 3 weeks with blood test which will include a CBC, iron, iron-binding capacity, [place] level, vitamin B12 level, folic acid, and the diagnostic PSA.  Patient will also have a CT scan of his abdomen to evaluate his weight loss, poor appetite and mid abdominal pain.    - CBC With Differential With Platelet; Future  - Iron And Tibc [E]; Future  - Ferritin [E]; Future  - Vitamin B12 [E]; Future  - Folic Acid Serum [E]; Future  - PSA - Diagnostic  [E]; Future  - CT ABDOMEN+PELVIS (W AND W/O CONTRAST)(CPT=74178); Future    19. Weight loss  Patient has been losing weight over time.  Patient is lost 6 pounds since he was here in March.  In the past patient weighed around 200 to 205 pounds.  Patient is now down to 157 pounds.    - PSA - Diagnostic [E]; Future  - CT ABDOMEN+PELVIS (W AND W/O CONTRAST)(CPT=74178); Future    20. Anorexia  Patient complains of a problem with nausea and decreased appetite.  This been contributing to his weight loss.  Patient will have a CT scan of his abdomen to evaluate his weight loss, abdominal discomfort and anorexia.    - CT ABDOMEN+PELVIS (W AND W/O CONTRAST)(CPT=74178); Future    21. Prostate nodule  Patient has a prostate nodule involving the left lobe of his prostate which is suspicious for prostate cancer.  Patient will have a diagnostic PSA.  He will also be CT scan of his abdomen and pelvis.    - PSA - Diagnostic [E]; Future  - CT ABDOMEN+PELVIS (W AND W/O CONTRAST)(CPT=74178); Future      Mahesh Mendiola MD  6/19/2024  7:50 PM

## 2024-06-21 NOTE — TELEPHONE ENCOUNTER
Please call Mrs Suero and notify her that I will call her back later today.  I will forward this to nursing.  Thank you!!   Pt arrived to ED by EMS, c/o hip and back pain from a fall that happened prior to arrival, no medication given during transport, pt is stable at this time

## 2024-06-23 ENCOUNTER — TELEPHONE (OUTPATIENT)
Dept: INTERNAL MEDICINE CLINIC | Facility: CLINIC | Age: 84
End: 2024-06-23

## 2024-06-23 NOTE — TELEPHONE ENCOUNTER
Tanmay Thorpe and Dr Barclay,    I want to let you know that I am referring Mr Kashif Suero to both of you for evaluation.  Pt was seen by me last week for his Annual Physical.  At that time pt was doing poorly(wife had also told me that pt was doing poorly.  Pt is weak.  His appetite has been poor. He has lost weight--6 lbs since March but 40-50 lbs overall. Pt also c/o mid/low abdominal pain daily in his abdomen.  Rectal exam was negative for occult blood.  Pt did have a nodule large nodule on the left lobe of hjis prostate.  Pt has developed a new onset anemia.  His appetite is poor.  Hb-11.0, FE-24, TIBC-435, % satn-6%, Ferritin-9.4, Folic Acid-5.4 and PSA-7.5.    I have scheduled pt for a CT scan of his Abd and Pelvis to be done in early July.  Pt will follow up with one of my partners(Dr Barnett or Dr Macias) in 2-3 weeks also as I am retiring later this week(June 30 or earlier).  Thank you for your assistance with this nice man!!    Denzel Mendiola

## 2024-06-25 ENCOUNTER — TELEPHONE (OUTPATIENT)
Facility: CLINIC | Age: 84
End: 2024-06-25

## 2024-06-25 NOTE — TELEPHONE ENCOUNTER
Jas Mahoney,    Thanks for your message. I will have my office staff reach out to him. Enjoy your jail!    Best,  Eric    GI staff:    Please reach out to the patient to schedule an appointment with me in July. 7/18 at 9 AM would be good    Thanks    MD Brandan [facility] - Gastroenterology  6/25/2024  11:19 AM

## 2024-06-25 NOTE — TELEPHONE ENCOUNTER
Spoke to patient's spouse and confirmed appt. Location/date/time details provided.     Your appointments       Date & Time Appointment Department ([place])    Jul 18, 2024 9:00 AM CDT Consult with Rudolph Thorpe MD [facility] ([facility])

## 2024-06-25 NOTE — TELEPHONE ENCOUNTER
Patient's spouse calling regards an appointment with Dr Thorpe to be set up. Please call. (See telephone encounter from 6/23/24)

## 2024-07-05 ENCOUNTER — TELEPHONE (OUTPATIENT)
Dept: CASE MANAGEMENT | Age: 84
End: 2024-07-05

## 2024-07-05 NOTE — TELEPHONE ENCOUNTER
Kashif Cline Dr.'s CT of the abdomen and pelvis has gone to Medical Director review, and they are looking for more clinical information concerning the prospect of cancer, US/CT showing the prostate nodule.    If you, or a clinical staff member is available to do a P2P, you can call Nasreen at [phone], and use CASE# [id number] as your reference.    Thank you  Ania DUGAN

## 2024-07-05 NOTE — TELEPHONE ENCOUNTER
Patients wife is calling and states the patient has an appointment with Dr Macias on 7/8/24 to go over the the patient CT test results.  The wife states that the CT has been put on hold because the approval for the test is delayed.    The wife is asking if the patient should still see   Dr Macias on 7/8 or wait to see if the test is approved.    See message below from [facility]    Please call the wife Shanice and advise.    Please note this is a prior patient of Dr Mendiola

## 2024-07-05 NOTE — TELEPHONE ENCOUNTER
Yes, lets keep the appointment.  Dr. Mendiola notified me of our clinical workup.  Let me review his chart for now, we will do our evaluation, and I will try to push for the CT scan to be approved.  But lets do our clinic visit as scheduled

## 2024-07-07 NOTE — PATIENT INSTRUCTIONS
Patient in clinic today for follow-up.  Today, we did review your recent workup with concern of weight loss, abdominal symptoms, and lab abnormalities as below    I will call the insurance to get the CT Scan approved by insurance.    Thus far, we recommended:  - Obtaining a chest x-ray to evaluate the lungs  - We will work towards obtaining the CT abdomen and pelvis recommended by Dr. Mendiola.  Specifically we are trying to rule out any form of cancer that could be related to your symptoms  - We will also obtain a breath test for H. pylori as this can cause anemia and vitamin deficiencies  - Your PSA level was elevated for which we should workup concern for prostate cancer.  Please see urology, Dr. Gleason    The blood tests can be done at any time and we can wait until you see your other specialists (Dr. Thorpe - Gastroenterology, Dr. Barclay - Urology)    -Anemia workup was remarkable for iron deficiency and folic acid deficiency.  Vitamin B12 is on the lower end of normal  - Would recommend folic acid supplementation 1 mg daily  - Would recommend vitamin B12/cyanocobalamin 1000 mcg once a day  - Will start ferrous sulfate 325 mg once a day (already taking)    -Acetaminophen 500-650 mg every 4-6 hours as needed for pain relief  - Should avoid NSAIDs given history of chronic kidney disease  - On duloxetine 30 mg daily  - Notify us if pain symptoms worsen over time    In the meantime, try to maintain adequate hydration, nutrition.  Please also incorporate nutritional supplements such as Ensure/Glucerna/protein shakes such as boost    Return to clinic in 6-8 weeks for follow-up - let's wait back on these results first

## 2024-07-07 NOTE — PROGRESS NOTES
Chief Complaint:   Chief Complaint   Patient presents with    Follow - Up     Pt here for general follow up          HPI:     Mr. LUBIN is a 84 year old male PMHX coronary artery disease, type 2 diabetes, hypertension, hyperlipidemia, peripheral polyneuropathy, IBS, GERD, lumbar spinal stenosis with chronic back pain, BPH, stage III CKD coming in for establish care and follow-up.    Patient initially seen 6/19/2024 by prior PCP, Dr. Mendiola.  There are concerns for memory changes, stomach pain with poor appetite.  Reportedly did not have any taste.  He has had 6 pound weight loss since March.  However his weight has gone down from 205 down to 157.  Does have pain in the legs noted to have significant anemia.  He has overall generalized weakness.    Symptoms first started  2-2.5 years. Symptoms included chronic pain that was diffuse from Hx of Polio, arthritis pain. Then the teeth worned down per his dentist. He also had associated difficulty with swallowing.  This was related to Polio in which the R neck was weaker than the L neck. Since this happened, his swallowing ability. He otherwise was a good athlete growing up - basketball, voleyball, golf.    Normal weight 190-200s in which he had a stomach. Highest was 205 -> 154 today. He started noticing the weight loss, no appetite, no thirst. 3 months.     Shanice notes that it is very difficult to eat - salmon, pasta. Tender beef, things that are easy to take in.     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 and will require a pediatric ET tube if procedure needed    Past Medical History:    Allergic rhinitis    Cataract of both eyes    Cholelithiasis    Depression    Elevated PSA    Hemorrhoids    High triglycerides    IBS (irritable bowel syndrome)    No diabetic retinopathy OU    Osteoarthrosis, unspecified whether generalized or localized, unspecified site    Spinal stenosis    Torn tendon    Right shoulder    Type II or unspecified type diabetes mellitus  without mention of complication, not stated as uncontrolled    Unspecified essential hypertension    Vitreous floaters of both eyes     Past Surgical History:   Procedure Laterality Date    Angioplasty (coronary)  01/01/2011    + stent(s)    Cataract extraction w/  intraocular lens implant Right 03/16/2023    Dr. Gilmore done @ [facility]     Social History:  Social History     Socioeconomic History    Marital status:    Tobacco Use    Smoking status: Never    Smokeless tobacco: Never   Vaping Use    Vaping status: Never Used   Substance and Sexual Activity    Alcohol use: Yes     Comment: rarely    Drug use: No   Other Topics Concern    Caffeine Concern Yes     Comment: Coffee 1 cup daily    Exercise Yes   Social History Narrative    The patient does not use an assistive device..      The patient does live in a home with stairs.     Family History:  Family History   Problem Relation Age of Onset    Heart Disease Father     Stroke Mother     Pulmonary Disease Brother     Diabetes Brother     Heart Attack Daughter 55    Glaucoma Neg     Macular degeneration Neg      Allergies:  Allergies   Allergen Reactions    Ace Inhibitors Coughing     Other reaction(s): Cough     Current Meds:  Current Outpatient Medications   Medication Sig Dispense Refill    folic acid 1 MG Oral Tab Take 1 tablet (1 mg total) by mouth daily. 90 tablet 1    IPRATROPIUM 0.06 % Nasal Solution USE 2 SPRAYS NASALLY 4     TIMES DAILY 30 mL 3    LOSARTAN 100 MG Oral Tab TAKE 1 TABLET DAILY 90 tablet 3    METOPROLOL SUCCINATE ER 25 MG Oral Tablet 24 Hr TAKE 1 TABLET DAILY 90 tablet 3    dutasteride 0.5 MG Oral Cap Take 1 capsule (0.5 mg total) by mouth daily. 90 capsule 3    TAMSULOSIN 0.4 MG Oral Cap TAKE 1 CAPSULE DAILY 90 capsule 3    atorvastatin 20 MG Oral Tab Take 1 tablet (20 mg total) by mouth daily.      donepezil (ARICEPT) 10 MG Oral Tab Take 1 tablet (10 mg total) by mouth nightly. 90 tablet 3    FAMOTIDINE 20 MG Oral Tab TAKE 1 TABLET BY  MOUTH TWICE A  tablet 3    zolpidem 10 MG Oral Tab TAKE 1 TABLET BY MOUTH NIGHTLY AS NEEDED FOR SLEEP 90 tablet 1    DULOXETINE 30 MG Oral Cap DR Particles TAKE 1 CAPSULE DAILY 90 capsule 3    AMLODIPINE 10 MG Oral Tab TAKE 1 TABLET DAILY 90 tablet 3    celecoxib 200 MG Oral Cap Take 1 capsule (200 mg total) by mouth daily. 90 capsule 3    METFORMIN HCL 1000 MG Oral Tab TAKE 1 TABLET TWICE DAILY  WITH MEALS 180 tablet 3    cetirizine 10 MG Oral Tab Take 1 tablet (10 mg total) by mouth daily.      Cholecalciferol (VITAMIN D3) 25 MCG (1000 UT) Oral Cap Take 1 tablet by mouth daily. 2000 units daily      LORAZEPAM 0.5 MG Oral Tab TAKE 1 TABLET BY MOUTH TWICE A DAY AS NEEDED 30 tablet 5    Coenzyme Q10 (CO Q-10) 200 MG Oral Cap Take 1 tablet by mouth daily.      aspirin 81 MG Oral Tab EC Take 1 tablet (81 mg total) by mouth daily.        Counseling given: Not Answered       REVIEW OF SYSTEMS:   Positive Findings indicated in BOLD    Constitutional: Fever, Chills, Weight Gain, Weight Loss, Night Sweats, Fatigue, Malaise  ENT/Mouth:  Hearing Changes, Ear Pain, Nasal Congestion, Sinus Pain, Hoarseness, Sore throat, Rhinorrhea, Swallowing Difficulty  Eyes: Eye Pain, Swelling, Redness, Foreign Body, Discharge, Vision Changes  Cardiovascular: Chest Pain, SOB, PND, Dyspnea on Exertion, Orthopnea, Claudication, Edema, Palpitations  Respiratory: Cough, Sputum, Wheezing, Shortness of breath  Gastrointestinal: Nausea, Vomiting, Diarrhea, Constipation, Pain, Heartburn, Dysphagia, Bloody stools, Tarry stools  Genitourinary: Dysmenorrhea, Dysuria, Urinary Frequency, Hematuria, Urinary Incontinence, Urgency,  Flank Pain  Musculoskeletal: Arthralgias, Myalgias, Joint Swelling, Joint Stiffness, Back Pain, Neck Pain  Integumentary: Skin Lesions, Pruritis, Hair Changes, Jaundice, Nail changes  Neuro: Weakness, Numbness, Paresthesias, Loss of Consciousness, Syncope, Dizziness, Headache, Falls  Psych: Anxiety, Depression, Insomnia,  Suicidal Ideation, Homicidal ideation, Memory Changes  Heme/Lymph: Bruising, Bleeding, Lymphadenopathy  Endocrine: Polyuria, Polydipsia, Temperature Intolerance    EXAM:   Vital Signs:  Blood pressure 136/80, pulse 55, temperature 97.9 °F (36.6 °C), height 5' 7\" (1.702 m), weight 154 lb (69.9 kg), SpO2 97%.     Constitutional: No acute distress. Alert and oriented x 3.  Eyes: EOMI, PERRLA, clear sclera b/l  HENT: NCAT, Moist mucous membranes, Oropharynx without erythema or exudates  Cardiovascular: S1, S2, no S3, no S4, Regular rate and rhythm, No murmurs/gallops/rubs.   Respiratory: Clear to auscultation bilaterally.  No wheezes/rales/rhonchi  Gastrointestinal: Soft, nontender, nondistended. Positive bowel sounds x 4. No rebound tenderness. No hepatomegaly, No splenomegaly  Genitourinary: No CVA tenderness bilaterally  Neurologic: No focal neurological deficits, CN II-XII intact, light touch intact, MSK Strength 5/5 and symmetric in all extremities, normal gait, 2+ patellar tendon  Musculoskeletal: Full range of motion of all extremities, no clubbing/swelling/edema  Skin: No lesions, No erythema, no jaundice, Cap Refill < 2s  Psychiatric: Appropriate mood and affect  Heme/Lymph/Immune: No cervical LAD          DATA REVIEWED   Labs:  Recent Results (from the past 8760 hour(s))   Comp Metabolic Panel (14)    Collection Time: 06/17/24  7:57 AM   Result Value Ref Range    Glucose 102 (H) 70 - 99 mg/dL    Sodium 141 136 - 145 mmol/L    Potassium 4.2 3.5 - 5.1 mmol/L    Chloride 106 98 - 112 mmol/L    CO2 30.0 21.0 - 32.0 mmol/L    Anion Gap 5 0 - 18 mmol/L    BUN 11 9 - 23 mg/dL    Creatinine 1.10 0.70 - 1.30 mg/dL    BUN/CREA Ratio 10.0 10.0 - 20.0    Calcium, Total 9.5 8.7 - 10.4 mg/dL    Calculated Osmolality 292 275 - 295 mOsm/kg    eGFR-Cr 66 >=60 mL/min/1.73m2    ALT 9 (L) 10 - 49 U/L    AST 19 <=34 U/L    Alkaline Phosphatase 91 45 - 117 U/L    Bilirubin, Total 0.5 0.2 - 1.1 mg/dL    Total Protein 6.4 5.7 - 8.2  g/dL    Albumin 4.1 3.2 - 4.8 g/dL    Globulin  2.3 2.0 - 3.5 g/dL    A/G Ratio 1.8 1.0 - 2.0    Patient Fasting for CMP? Yes      *Note: Due to a large number of results and/or encounters for the requested time period, some results have not been displayed. A complete set of results can be found in Results Review.       Recent Results (from the past 8760 hour(s))   CBC W/ DIFFERENTIAL    Collection Time: 06/19/24  4:12 PM   Result Value Ref Range    WBC 8.5 4.0 - 11.0 x10(3) uL    RBC 4.07 3.80 - 5.80 x10(6)uL    HGB 11.0 (L) 13.0 - 17.5 g/dL    HCT 35.4 (L) 39.0 - 53.0 %    MCV 87.0 80.0 - 100.0 fL    MCH 27.0 26.0 - 34.0 pg    MCHC 31.1 31.0 - 37.0 g/dL    RDW-SD 41.1 35.1 - 46.3 fL    RDW 13.2 11.0 - 15.0 %    .0 150.0 - 450.0 10(3)uL    Neutrophil Absolute Prelim 5.66 1.50 - 7.70 x10 (3) uL    Neutrophil Absolute 5.66 1.50 - 7.70 x10(3) uL    Lymphocyte Absolute 1.93 1.00 - 4.00 x10(3) uL    Monocyte Absolute 0.72 0.10 - 1.00 x10(3) uL    Eosinophil Absolute 0.17 0.00 - 0.70 x10(3) uL    Basophil Absolute 0.01 0.00 - 0.20 x10(3) uL    Immature Granulocyte Absolute 0.01 0.00 - 1.00 x10(3) uL    Neutrophil % 66.6 %    Lymphocyte % 22.7 %    Monocyte % 8.5 %    Eosinophil % 2.0 %    Basophil % 0.1 %    Immature Granulocyte % 0.1 %     *Note: Due to a large number of results and/or encounters for the requested time period, some results have not been displayed. A complete set of results can be found in Results Review.       ASSESSMENT AND PLAN:     Weight loss with anorexia  Patient has been losing weight over time.  Patient is lost 6 pounds since he was here in March.  In the past patient weighed around 200 to 205 pounds.  Patient is now down to 157 pounds.  - On Dr. Mendiola's initial evaluation, he is concerned about occult malignancy given significant weight loss unintentionally, significant anemia, complaints of abdominal pain, elevated PSA  - Noted multiple nutritional deficiencies and iron deficiency,  folic acid deficiency, borderline vitamin B12 deficiency  - Will obtain a CT abdomen and pelvis with and without IV contrast as symptoms seem to localize to the gastrointestinal tract  -No smoking history, but we should obtain a chest x-ray    ASHD (arteriosclerotic heart disease)  History of PCI of LAD and RCA more than 10 years ago  - Undergoing Lexiscan stress test with Dr. Wade  -Planning for repeat echocardiogram on an annual basis  - On aspirin 81 mg daily, control of cholesterol as below     Type 2 diabetes mellitus without complication, without long-term current use of insulin ([facility])  Recent A1c:   HgbA1C (%)   Date Value   06/17/2024 6.0 (H)     Recent urine microalbumin: No components found for: \"URINEMICROALBUMIN\"  Current medications: 1000 mg metformin twice a day  Eye exam: Due for diabetic eye exam  Foot exam: Check feet daily    Hypertension  -Blood pressure today at goal  - Check blood pressures at home  - Continue with home amlodipine 10 mg daily, losartan 100 mg daily, metoprolol succinate 25 mg daily     Hyperlipidemia  -Last lipid panel remains at goal  - Repeat fasting lipid panel at next visit  - Continue with atorvastatin 20 mg daily      Mitral valve insufficiency, unspecified etiology  Aortic valve stenosis, etiology of cardiac valve disease unspecified  -Being monitored by cardiology, Dr. Wade, last TTE with moderate mitral regurgitation but normal left ventricular ejection fraction and chamber size.  Plan to repeat TTE ED annually     Peripheral polyneuropathy  -Consider effects of diabetic polyneuropathy.  Should also consider vitamin B12 relative deficiency as we are on the lower end of normal  - We will continue glycemic control, vitamin B12 supplementation as below     Gastroesophageal reflux disease, unspecified whether esophagitis present  Irritable bowel syndrome without diarrhea  -On famotidine 20 mg twice a day  - May benefit from H. pylori evaluation.  Will obtain a breath  test    Stage 3a chronic kidney disease (HCC)  Patient's recent CMP has a BUN of 11 and creatinine 1.1 and a GFR of 66.   -Will monitor closely  - Continue with adequate water intake     Anemia, unspecified type  Thrombocytopenia  Patient has a new onset anemia that has been gradually getting worse.  Patient's recent CBC showed the hemoglobin to be 10.5, hematocrit 32.1 and WBC to be 7000 with a platelet count of 120,000.    -Anemia workup was remarkable for iron deficiency and folic acid deficiency.  Vitamin B12 is on the lower end of normal  - Would recommend folic acid supplementation 1 mg daily  - Would recommend vitamin B12/cyanocobalamin 1000 mcg once a day  - Will start ferrous sulfate 325 mg once a day     Prostate nodule  BPH with LUTS  Patient has a prostate nodule involving the left lobe of his prostate which is suspicious for prostate cancer.  Patient will have a diagnostic PSA.  -PSA elevated 7.50  - For concern of prostate cancer, does require imaging ideally with CT scan  - Referral for urology    Spinal stenosis of lumbar region, unspecified whether neurogenic claudication   Chronic bilateral low back pain without sciatica  Patient complains of much pain in his low back region.  He also has some pain in the upper back region.  -Acetaminophen 500-650 mg every 4-6 hours as needed for pain relief  - Should avoid NSAIDs given history of chronic kidney disease  - On duloxetine 30 mg daily  - Notify us if pain symptoms worsen over time    Memory changes  - May be age-related, however there are likely some contribution with vitamin B12 deficiency  - We will proceed with vitamin B12 supplementation, 1000 mcg once a day  - Monitor for response, will need to reassess once we have a better idea of his weight loss as above         Orders This Visit:  Orders Placed This Encounter   Procedures    Helicobacter Pylori Breath Test, Adult    CBC W Differential W Platelet [E]    Ferritin [E]    Iron And Tibc [E]     Folic Acid Serum [E]    Vitamin B12 [E]    Comp Metabolic Panel (14) [E]       Meds This Visit:  Requested Prescriptions     Signed Prescriptions Disp Refills    folic acid 1 MG Oral Tab 90 tablet 1     Sig: Take 1 tablet (1 mg total) by mouth daily.       Imaging & Referrals:  XR CHEST PA + LAT CHEST ([id number]=[id number])     Health Maintenance  Last Medicare physical 6/19/2024  - Due for dilated eye exam for diabetes  - COVID dose 8 due    Spent 45 minutes obtaining history, evaluating patient, discussing treatment options, diet, exercise, review of available labs and radiology reports, and completing documentation.       Return to clinic in 6-8 weeks for follow-up    Jose Angel Macias MD, 07/08/24, 7:19 PM

## 2024-07-08 ENCOUNTER — OFFICE VISIT (OUTPATIENT)
Dept: INTERNAL MEDICINE CLINIC | Facility: CLINIC | Age: 84
End: 2024-07-08
Payer: MEDICARE

## 2024-07-08 VITALS
TEMPERATURE: 98 F | BODY MASS INDEX: 24.17 KG/M2 | SYSTOLIC BLOOD PRESSURE: 136 MMHG | DIASTOLIC BLOOD PRESSURE: 80 MMHG | WEIGHT: 154 LBS | OXYGEN SATURATION: 97 % | HEIGHT: 67 IN | HEART RATE: 55 BPM

## 2024-07-08 DIAGNOSIS — E11.9 TYPE 2 DIABETES MELLITUS WITHOUT COMPLICATION, WITHOUT LONG-TERM CURRENT USE OF INSULIN (HCC): ICD-10-CM

## 2024-07-08 DIAGNOSIS — D64.9 ANEMIA, UNSPECIFIED TYPE: ICD-10-CM

## 2024-07-08 DIAGNOSIS — I25.10 ASHD (ARTERIOSCLEROTIC HEART DISEASE): ICD-10-CM

## 2024-07-08 DIAGNOSIS — K58.9 IRRITABLE BOWEL SYNDROME WITHOUT DIARRHEA: ICD-10-CM

## 2024-07-08 DIAGNOSIS — I35.0 AORTIC VALVE STENOSIS, ETIOLOGY OF CARDIAC VALVE DISEASE UNSPECIFIED: ICD-10-CM

## 2024-07-08 DIAGNOSIS — R97.20 ELEVATED PSA: ICD-10-CM

## 2024-07-08 DIAGNOSIS — E53.8 VITAMIN B12 DEFICIENCY: ICD-10-CM

## 2024-07-08 DIAGNOSIS — G62.9 PERIPHERAL POLYNEUROPATHY: ICD-10-CM

## 2024-07-08 DIAGNOSIS — K21.9 GASTROESOPHAGEAL REFLUX DISEASE, UNSPECIFIED WHETHER ESOPHAGITIS PRESENT: ICD-10-CM

## 2024-07-08 DIAGNOSIS — M54.50 CHRONIC BILATERAL LOW BACK PAIN WITHOUT SCIATICA: ICD-10-CM

## 2024-07-08 DIAGNOSIS — R41.3 MEMORY PROBLEM: ICD-10-CM

## 2024-07-08 DIAGNOSIS — E53.8 FOLATE DEFICIENCY: ICD-10-CM

## 2024-07-08 DIAGNOSIS — N18.31 STAGE 3A CHRONIC KIDNEY DISEASE (HCC): ICD-10-CM

## 2024-07-08 DIAGNOSIS — M48.061 SPINAL STENOSIS OF LUMBAR REGION, UNSPECIFIED WHETHER NEUROGENIC CLAUDICATION PRESENT: ICD-10-CM

## 2024-07-08 DIAGNOSIS — M15.9 PRIMARY OSTEOARTHRITIS INVOLVING MULTIPLE JOINTS: ICD-10-CM

## 2024-07-08 DIAGNOSIS — I10 ESSENTIAL HYPERTENSION: ICD-10-CM

## 2024-07-08 DIAGNOSIS — R63.4 UNINTENTIONAL WEIGHT LOSS: Primary | ICD-10-CM

## 2024-07-08 DIAGNOSIS — G89.29 CHRONIC BILATERAL LOW BACK PAIN WITHOUT SCIATICA: ICD-10-CM

## 2024-07-08 DIAGNOSIS — N40.2 PROSTATE NODULE: ICD-10-CM

## 2024-07-08 DIAGNOSIS — K29.70 GASTRITIS WITHOUT BLEEDING, UNSPECIFIED CHRONICITY, UNSPECIFIED GASTRITIS TYPE: ICD-10-CM

## 2024-07-08 DIAGNOSIS — D69.6 THROMBOCYTOPENIA (HCC): ICD-10-CM

## 2024-07-08 RX ORDER — FOLIC ACID 1 MG/1
1 TABLET ORAL DAILY
Qty: 90 TABLET | Refills: 1 | Status: SHIPPED | OUTPATIENT
Start: 2024-07-08

## 2024-07-12 ENCOUNTER — HOSPITAL ENCOUNTER (OUTPATIENT)
Dept: CT IMAGING | Facility: HOSPITAL | Age: 84
Discharge: HOME | End: 2024-07-12
Attending: INTERNAL MEDICINE
Payer: MEDICARE

## 2024-07-12 DIAGNOSIS — D64.9 ANEMIA, UNSPECIFIED TYPE: ICD-10-CM

## 2024-07-12 DIAGNOSIS — R63.0 ANOREXIA: ICD-10-CM

## 2024-07-12 DIAGNOSIS — R63.4 WEIGHT LOSS: ICD-10-CM

## 2024-07-12 DIAGNOSIS — D69.6 THROMBOCYTOPENIA (HCC): ICD-10-CM

## 2024-07-12 DIAGNOSIS — N40.2 PROSTATE NODULE: ICD-10-CM

## 2024-07-12 DIAGNOSIS — R53.83 FATIGUE, UNSPECIFIED TYPE: ICD-10-CM

## 2024-07-12 PROCEDURE — 74177 CT ABD & PELVIS W/CONTRAST: CPT | Performed by: INTERNAL MEDICINE

## 2024-07-15 ENCOUNTER — HOSPITAL ENCOUNTER (OUTPATIENT)
Dept: GENERAL RADIOLOGY | Facility: HOSPITAL | Age: 84
Discharge: HOME | End: 2024-07-15
Attending: INTERNAL MEDICINE
Payer: MEDICARE

## 2024-07-15 ENCOUNTER — NURSE ONLY (OUTPATIENT)
Dept: LAB | Facility: HOSPITAL | Age: 84
End: 2024-07-15
Attending: INTERNAL MEDICINE
Payer: MEDICARE

## 2024-07-15 ENCOUNTER — TELEPHONE (OUTPATIENT)
Dept: INTERNAL MEDICINE CLINIC | Facility: CLINIC | Age: 84
End: 2024-07-15

## 2024-07-15 DIAGNOSIS — R63.4 UNINTENTIONAL WEIGHT LOSS: ICD-10-CM

## 2024-07-15 DIAGNOSIS — K29.70 GASTRITIS WITHOUT BLEEDING, UNSPECIFIED CHRONICITY, UNSPECIFIED GASTRITIS TYPE: ICD-10-CM

## 2024-07-15 DIAGNOSIS — E53.8 VITAMIN B12 DEFICIENCY: ICD-10-CM

## 2024-07-15 PROCEDURE — 83013 H PYLORI (C-13) BREATH: CPT

## 2024-07-15 PROCEDURE — 71046 X-RAY EXAM CHEST 2 VIEWS: CPT | Performed by: INTERNAL MEDICINE

## 2024-07-15 NOTE — TELEPHONE ENCOUNTER
Please notify patient that the chest x-ray did not show any significant abnormalities with respect to his weight loss.  I am awaiting the results of the CT scan that was approved last week.  Will await final report

## 2024-07-16 LAB — H PYLORI BREATH TEST: NEGATIVE

## 2024-07-17 NOTE — H&P
[facility] - Gastroenterology                                                                                                  Clinic History and Physical       Referring provider: Maury    Chief Complaint   Patient presents with    Consult     Referred by Dr. Mendiola; been having abdominal pain; had CT scan      HPI:   Kashif Suero is a 84 year old man with history of BMI 24, diabetes, chronic kidney disease, hypertension, hypercholesterolemia, valvular heart disease, polyneuropathy, spinal stenosis, memory problems, osteoarthritis, coronary artery disease status post remote PCI here with his wife regarding poor appetite, weight loss, abdominal pain and iron deficiency anemia    Says he is here today as he was referred by his primary doctor.  He notes abdominal pain in the mid abdomen going across over the last month to month and a half.  His wife notes however that has been going on longer than this.  The patient describes the pain worsening in regards to frequency to where it is now happening daily.  Says it usually starts in the mid day lasting between 2 to 5 hours.  Denies any associated nausea or vomiting.  Has not had this before.  Notes altered bowel habits at least once a day sometimes 6 times a day.  Denies any blood in the stool or dark stools.  Says he has been losing weight unintentionally.  Does not have an appetite and not eating much.  His wife believes he may have had a colonoscopy 7 years ago at [facility].  She started oral iron.  Also noted with started an antibiotic for a root canal yesterday.    History, Medications, Allergies, ROS:      Past Medical History:    Allergic rhinitis    Cataract of both eyes    Cholelithiasis    Depression    Elevated PSA    Hemorrhoids    High triglycerides    IBS (irritable bowel syndrome)    No diabetic retinopathy OU    Osteoarthrosis, unspecified  whether generalized or localized, unspecified site    Spinal stenosis    Torn tendon    Right shoulder    Type II or unspecified type diabetes mellitus without mention of complication, not stated as uncontrolled    Unspecified essential hypertension    Vitreous floaters of both eyes      Past Surgical History:   Procedure Laterality Date    Angioplasty (coronary)  01/01/2011    + stent(s)    Cataract extraction w/  intraocular lens implant Right 03/16/2023    Dr. Gilmore done @ [facility]      Family Hx:   Family History   Problem Relation Age of Onset    Heart Disease Father     Stroke Mother     Pulmonary Disease Brother     Diabetes Brother     Heart Attack Daughter 55    Glaucoma Neg     Macular degeneration Neg       Social History:   Social History     Socioeconomic History    Marital status:    Tobacco Use    Smoking status: Never    Smokeless tobacco: Never   Vaping Use    Vaping status: Never Used   Substance and Sexual Activity    Alcohol use: Yes     Comment: rarely    Drug use: No   Other Topics Concern    Caffeine Concern Yes     Comment: Coffee 1 cup daily    Exercise Yes   Social History Narrative    The patient does not use an assistive device..      The patient does live in a home with stairs.        Medications (Active prior to today's visit):  Current Outpatient Medications   Medication Sig Dispense Refill    folic acid 1 MG Oral Tab Take 1 tablet (1 mg total) by mouth daily. 90 tablet 1    IPRATROPIUM 0.06 % Nasal Solution USE 2 SPRAYS NASALLY 4     TIMES DAILY 30 mL 3    LOSARTAN 100 MG Oral Tab TAKE 1 TABLET DAILY 90 tablet 3    METOPROLOL SUCCINATE ER 25 MG Oral Tablet 24 Hr TAKE 1 TABLET DAILY 90 tablet 3    dutasteride 0.5 MG Oral Cap Take 1 capsule (0.5 mg total) by mouth daily. 90 capsule 3    TAMSULOSIN 0.4 MG Oral Cap TAKE 1 CAPSULE DAILY 90 capsule 3    atorvastatin 20 MG Oral Tab Take 1 tablet (20 mg total) by mouth daily.      donepezil (ARICEPT) 10 MG Oral Tab Take 1 tablet (10 mg  total) by mouth nightly. 90 tablet 3    FAMOTIDINE 20 MG Oral Tab TAKE 1 TABLET BY MOUTH TWICE A  tablet 3    zolpidem 10 MG Oral Tab TAKE 1 TABLET BY MOUTH NIGHTLY AS NEEDED FOR SLEEP 90 tablet 1    DULOXETINE 30 MG Oral Cap DR Particles TAKE 1 CAPSULE DAILY 90 capsule 3    AMLODIPINE 10 MG Oral Tab TAKE 1 TABLET DAILY 90 tablet 3    celecoxib 200 MG Oral Cap Take 1 capsule (200 mg total) by mouth daily. 90 capsule 3    METFORMIN HCL 1000 MG Oral Tab TAKE 1 TABLET TWICE DAILY  WITH MEALS 180 tablet 3    cetirizine 10 MG Oral Tab Take 1 tablet (10 mg total) by mouth daily.      Cholecalciferol (VITAMIN D3) 25 MCG (1000 UT) Oral Cap Take 1 tablet by mouth daily. 2000 units daily      LORAZEPAM 0.5 MG Oral Tab TAKE 1 TABLET BY MOUTH TWICE A DAY AS NEEDED 30 tablet 5    Coenzyme Q10 (CO Q-10) 200 MG Oral Cap Take 1 tablet by mouth daily.      aspirin 81 MG Oral Tab EC Take 1 tablet (81 mg total) by mouth daily.         Allergies:  Allergies   Allergen Reactions    Ace Inhibitors Coughing     Other reaction(s): Cough       ROS:   Systems were reviewed and were negative except as noted in the HPI    PHYSICAL EXAM:   Blood pressure 120/58, height 5' 7\" (1.702 m), weight 157 lb (71.2 kg).    General:awake, cooperative, no acute distress  HEENT: EOMI, no scleral icterus, MMM; oral pharnyx is without exudates or lesions  Neck: no lymphadenopathy; thyroid is not enlarged and without nodules  CV: RRR  Resp: non-labored breathing  Abd: soft, non-tender, non-distended  Ext: no lower extremity swelling  Neuro: Alert, Oriented X 3  Skin: no rashes, bruises  Psych: normal affect    Labs/Imaging:     Reviewed as noted in the HPI and A/P    ASSESSMENT/PLAN:   Kashif Suero is a 84 year old man with history of BMI 24, diabetes, chronic kidney disease, hypertension, hypercholesterolemia, valvular heart disease, polyneuropathy, spinal stenosis, memory problems, osteoarthritis, coronary artery disease status post remote PCI  here with his wife regarding poor appetite, weight loss, abdominal pain and iron deficiency anemia    I reviewed last cardiology note from March 2024 indicating recommendations for Lexiscan PET stress, transthoracic echocardiogram and to follow-up after these tests.  I reviewed the reports of the chest x-ray he had 7/15/2024 and CT scan of the abdomen pelvis from 7/12/2024 both of which are unrevealing for the symptoms.  I reviewed multiple sets of CBCs since 2012 which is essentially noted normal hemoglobin until February of this year which has been somewhat stable since last checks in June of this year although mildly low at this time. Iron studies from 6/19/24 consistent with iron deficiency anemia    We discussed that the symptoms of abdominal pain, unintentional weight loss, poor appetite, altered bowel habits and findings of new iron deficiency anemia.  We discussed the testing he has had which has been unrevealing.  Discussed certainly the concern would be for a possible gastrointestinal tumor/cancer.  We discussed the typical next steps would be upper endoscopy and colonoscopy.  We discussed these procedures including the benefits and risks including but not limited to bleeding, infection, tears, anesthesia related complications which could lead to hospitalization, surgery, and or be life-threatening.  We discussed these risks would be increased in someone older with comorbidities.  Furthermore we discussed that he would require the cardiac testing that his cardiologist recommended 3 months ago.  He verbalizes he does not have any interest in doing the cardiac testing. He also is favoring not doing these procedures as well understanding the possibility of a missed tumor or cancer.  We discussed if the procedures are not going to be pursued he can try acetaminophen no more than 3 g in a day and continue the oral iron he was started on.  He and his wife will let me know if they have any further questions at  this time.    Orders This Visit:  No orders of the defined types were placed in this encounter.    Meds This Visit:  Requested Prescriptions      No prescriptions requested or ordered in this encounter     Imaging & Referrals:  None     Rudolph Thorpe MD  [facility] - Gastroenterology  7/18/2024

## 2024-07-18 ENCOUNTER — OFFICE VISIT (OUTPATIENT)
Dept: GASTROENTEROLOGY | Facility: CLINIC | Age: 84
End: 2024-07-18
Payer: MEDICARE

## 2024-07-18 VITALS
HEIGHT: 67 IN | DIASTOLIC BLOOD PRESSURE: 58 MMHG | BODY MASS INDEX: 24.64 KG/M2 | SYSTOLIC BLOOD PRESSURE: 120 MMHG | WEIGHT: 157 LBS

## 2024-07-18 DIAGNOSIS — D50.9 IRON DEFICIENCY ANEMIA, UNSPECIFIED IRON DEFICIENCY ANEMIA TYPE: ICD-10-CM

## 2024-07-18 DIAGNOSIS — R63.4 UNINTENTIONAL WEIGHT LOSS: ICD-10-CM

## 2024-07-18 DIAGNOSIS — R10.9 ABDOMINAL PAIN, UNSPECIFIED ABDOMINAL LOCATION: Primary | ICD-10-CM

## 2024-07-18 DIAGNOSIS — R63.0 POOR APPETITE: ICD-10-CM

## 2024-07-18 PROCEDURE — 99204 OFFICE O/P NEW MOD 45 MIN: CPT | Performed by: INTERNAL MEDICINE

## 2024-07-18 RX ORDER — AMOXICILLIN 500 MG/1
CAPSULE ORAL
COMMUNITY
Start: 2024-07-16

## 2024-07-19 ENCOUNTER — TELEPHONE (OUTPATIENT)
Dept: INTERNAL MEDICINE CLINIC | Facility: CLINIC | Age: 84
End: 2024-07-19

## 2024-07-19 NOTE — TELEPHONE ENCOUNTER
Attempted to call, went straight to voicemail.  I did review and appreciate Dr. Thorpe's consultation 7/18.  We do have urology visit 7/22 and I will follow-up with the patient next week over the phone

## 2024-07-22 ENCOUNTER — OFFICE VISIT (OUTPATIENT)
Dept: SURGERY | Facility: CLINIC | Age: 84
End: 2024-07-22
Payer: MEDICARE

## 2024-07-22 VITALS
BODY MASS INDEX: 24.33 KG/M2 | SYSTOLIC BLOOD PRESSURE: 145 MMHG | HEART RATE: 55 BPM | DIASTOLIC BLOOD PRESSURE: 72 MMHG | HEIGHT: 67 IN | WEIGHT: 155 LBS

## 2024-07-22 DIAGNOSIS — R97.20 ELEVATED PSA: ICD-10-CM

## 2024-07-22 DIAGNOSIS — N40.2 NODULAR PROSTATE: ICD-10-CM

## 2024-07-22 DIAGNOSIS — E27.8 ADRENAL MASS (HCC): Primary | ICD-10-CM

## 2024-07-22 PROCEDURE — 99204 OFFICE O/P NEW MOD 45 MIN: CPT | Performed by: UROLOGY

## 2024-07-23 NOTE — TELEPHONE ENCOUNTER
Attempted to call for follow-up. Appreciated Dr. Gleason's consultation yesterday. LVM to check in, will call on thursday

## 2024-07-30 ENCOUNTER — TELEPHONE (OUTPATIENT)
Dept: INTERNAL MEDICINE CLINIC | Facility: CLINIC | Age: 84
End: 2024-07-30

## 2024-07-31 RX ORDER — CELECOXIB 200 MG/1
200 CAPSULE ORAL DAILY
Qty: 90 CAPSULE | Refills: 3 | Status: SHIPPED | OUTPATIENT
Start: 2024-07-31

## 2024-08-09 ENCOUNTER — TELEPHONE (OUTPATIENT)
Dept: INTERNAL MEDICINE CLINIC | Facility: CLINIC | Age: 84
End: 2024-08-09

## 2024-08-09 DIAGNOSIS — G47.00 INSOMNIA, UNSPECIFIED TYPE: ICD-10-CM

## 2024-08-09 RX ORDER — ZOLPIDEM TARTRATE 10 MG/1
10 TABLET ORAL NIGHTLY PRN
Qty: 90 TABLET | Refills: 1 | Status: SHIPPED | OUTPATIENT
Start: 2024-08-09

## 2024-08-09 NOTE — TELEPHONE ENCOUNTER
To Dr. STOKES-    Are you able to assist in MD absence?     The above refill request is for a controlled substance.  Please review pended medication order.  LOV: 7/8  Prescribed: 90 with 1 2/13  : 5/10 90

## 2024-08-13 ENCOUNTER — TELEPHONE (OUTPATIENT)
Dept: INTERNAL MEDICINE CLINIC | Facility: CLINIC | Age: 84
End: 2024-08-13

## 2024-08-13 RX ORDER — IPRATROPIUM BROMIDE 42 UG/1
2 SPRAY, METERED NASAL 4 TIMES DAILY
Qty: 60 ML | Refills: 3 | Status: SHIPPED | OUTPATIENT
Start: 2024-08-13

## 2024-08-14 ENCOUNTER — LAB ENCOUNTER (OUTPATIENT)
Dept: LAB | Age: 84
End: 2024-08-14
Attending: INTERNAL MEDICINE
Payer: MEDICARE

## 2024-08-14 DIAGNOSIS — G62.9 PERIPHERAL POLYNEUROPATHY: ICD-10-CM

## 2024-08-14 DIAGNOSIS — K29.70 GASTRITIS WITHOUT BLEEDING, UNSPECIFIED CHRONICITY, UNSPECIFIED GASTRITIS TYPE: ICD-10-CM

## 2024-08-14 DIAGNOSIS — E53.8 FOLATE DEFICIENCY: ICD-10-CM

## 2024-08-14 DIAGNOSIS — D64.9 ANEMIA, UNSPECIFIED TYPE: ICD-10-CM

## 2024-08-14 LAB
ALBUMIN SERPL-MCNC: 4.1 G/DL (ref 3.2–4.8)
ALBUMIN/GLOB SERPL: 1.9 {RATIO} (ref 1–2)
ALP LIVER SERPL-CCNC: 70 U/L
ALT SERPL-CCNC: 8 U/L
ANION GAP SERPL CALC-SCNC: 7 MMOL/L (ref 0–18)
AST SERPL-CCNC: 19 U/L (ref ?–34)
BASOPHILS # BLD AUTO: 0.01 X10(3) UL (ref 0–0.2)
BASOPHILS NFR BLD AUTO: 0.2 %
BILIRUB SERPL-MCNC: 0.5 MG/DL (ref 0.2–1.1)
BUN BLD-MCNC: 12 MG/DL (ref 9–23)
BUN/CREAT SERPL: 9.9 (ref 10–20)
CALCIUM BLD-MCNC: 9.8 MG/DL (ref 8.7–10.4)
CHLORIDE SERPL-SCNC: 106 MMOL/L (ref 98–112)
CO2 SERPL-SCNC: 30 MMOL/L (ref 21–32)
CREAT BLD-MCNC: 1.21 MG/DL
DEPRECATED HBV CORE AB SER IA-ACNC: 11 NG/ML
DEPRECATED RDW RBC AUTO: 51.7 FL (ref 35.1–46.3)
EGFRCR SERPLBLD CKD-EPI 2021: 59 ML/MIN/1.73M2 (ref 60–?)
EOSINOPHIL # BLD AUTO: 0.15 X10(3) UL (ref 0–0.7)
EOSINOPHIL NFR BLD AUTO: 2.6 %
ERYTHROCYTE [DISTWIDTH] IN BLOOD BY AUTOMATED COUNT: 16.3 % (ref 11–15)
FASTING STATUS PATIENT QL REPORTED: NO
FOLATE SERPL-MCNC: 20.8 NG/ML (ref 5.4–?)
GLOBULIN PLAS-MCNC: 2.2 G/DL (ref 2–3.5)
GLUCOSE BLD-MCNC: 224 MG/DL (ref 70–99)
HCT VFR BLD AUTO: 34.4 %
HGB BLD-MCNC: 11.2 G/DL
IMM GRANULOCYTES # BLD AUTO: 0.01 X10(3) UL (ref 0–1)
IMM GRANULOCYTES NFR BLD: 0.2 %
IRON SATN MFR SERPL: 9 %
IRON SERPL-MCNC: 32 UG/DL
LYMPHOCYTES # BLD AUTO: 1.66 X10(3) UL (ref 1–4)
LYMPHOCYTES NFR BLD AUTO: 28.5 %
MCH RBC QN AUTO: 28.2 PG (ref 26–34)
MCHC RBC AUTO-ENTMCNC: 32.6 G/DL (ref 31–37)
MCV RBC AUTO: 86.6 FL
MONOCYTES # BLD AUTO: 0.47 X10(3) UL (ref 0.1–1)
MONOCYTES NFR BLD AUTO: 8.1 %
NEUTROPHILS # BLD AUTO: 3.52 X10 (3) UL (ref 1.5–7.7)
NEUTROPHILS # BLD AUTO: 3.52 X10(3) UL (ref 1.5–7.7)
NEUTROPHILS NFR BLD AUTO: 60.4 %
OSMOLALITY SERPL CALC.SUM OF ELEC: 303 MOSM/KG (ref 275–295)
PLATELET # BLD AUTO: 194 10(3)UL (ref 150–450)
PLATELETS.RETICULATED NFR BLD AUTO: 3.2 % (ref 0–7)
POTASSIUM SERPL-SCNC: 4.1 MMOL/L (ref 3.5–5.1)
PROT SERPL-MCNC: 6.3 G/DL (ref 5.7–8.2)
RBC # BLD AUTO: 3.97 X10(6)UL
SODIUM SERPL-SCNC: 143 MMOL/L (ref 136–145)
TIBC SERPL-MCNC: 373 UG/DL (ref 250–425)
TRANSFERRIN SERPL-MCNC: 250 MG/DL (ref 215–365)
VIT B12 SERPL-MCNC: 1123 PG/ML (ref 211–911)
WBC # BLD AUTO: 5.8 X10(3) UL (ref 4–11)

## 2024-08-14 PROCEDURE — 82746 ASSAY OF FOLIC ACID SERUM: CPT

## 2024-08-14 PROCEDURE — 85025 COMPLETE CBC W/AUTO DIFF WBC: CPT

## 2024-08-14 PROCEDURE — 80053 COMPREHEN METABOLIC PANEL: CPT

## 2024-08-14 PROCEDURE — 36415 COLL VENOUS BLD VENIPUNCTURE: CPT

## 2024-08-14 PROCEDURE — 82728 ASSAY OF FERRITIN: CPT

## 2024-08-14 PROCEDURE — 82607 VITAMIN B-12: CPT

## 2024-08-14 PROCEDURE — 84466 ASSAY OF TRANSFERRIN: CPT

## 2024-08-14 PROCEDURE — 83540 ASSAY OF IRON: CPT

## 2024-08-16 RX ORDER — MEMANTINE HYDROCHLORIDE 5 MG/1
5 TABLET ORAL DAILY
Qty: 90 TABLET | Refills: 1 | Status: SHIPPED | OUTPATIENT
Start: 2024-08-16

## 2024-08-16 NOTE — TELEPHONE ENCOUNTER
Discussed with Ely went over his lab results.  He still has iron deficiency.  However his anemia is stable.  His weight is down to 151, low appetite but trying to eat as best he can.    I did review his visit with Dr. Thorpe.  He is deferring the cardiac workup that precludes him from undergoing further advanced testing such as endoscopy.  I discussed with him that iron deficiency anemia with weight loss is concerning for potential cancerous process or colon polyps.  He would like to pursue the urology workup in the meantime.    Similarly, the PSA level is elevated for which he is scheduled for MRIs in September.  He would like to undergo the urology workup first and revisit the gastrointestinal workup in the future.    He will follow-up with urology 9/3 with Dr. Barclay, I would like to see him back sooner after that visit for general follow-up    He is also concerned about the memory loss, maximized on donepezil.  Amenable to trial of memantine 5 mg once a day.  There is no secondary abnormalities of the secondary workup at this time.  Will also reassess this at our next visit

## 2024-08-30 ENCOUNTER — TELEPHONE (OUTPATIENT)
Dept: INTERNAL MEDICINE CLINIC | Facility: CLINIC | Age: 84
End: 2024-08-30

## 2024-08-30 NOTE — TELEPHONE ENCOUNTER
Leta notes that he is eating - somewhat, soup/yogurt/pudding. Reviewed the labs. He is trying to eat more     I did review our gastrointestinal workup as Kashif is not interested in proceeding with the cardiac workup.  Discussed with Leta that she should discuss with him to at least get the stress test.  If it is normal, then we could reconsider the endoscopy outlined by Dr. Thorpe.  Ely is most concerned about having to do an angiogram as he had somewhat a prolonged recovery and some degree of PTSD from his last procedure over 10 years ago with the angiogram.    Show discussed with him as she is concerned he continues to experience symptoms such as abdominal pain, poor appetite, and the unexplained iron deficiency.  Aware that the images even the MRI may not be sensitive enough to determine for possible gastrointestinal malignancy    He is amenable to the urology workup and we will wait for the MRIs to be completed.    Will touch base with them again next week on how to proceed with Kashif's plan

## 2024-08-30 NOTE — TELEPHONE ENCOUNTER
Patient's wife, Leta (Verified HIPAA) called to speak to Dr. Macias directly regarding patient's lab results.     Patient is scheduled for MRI at the end of September as this was the soonest available.    Patient is not feeling well and Leta would like Dr. Macias's input as to whether he wants to see patient prior to MRI.     Leta's #[phone]

## 2024-09-05 ENCOUNTER — TELEPHONE (OUTPATIENT)
Dept: SURGERY | Facility: CLINIC | Age: 84
End: 2024-09-05

## 2024-09-05 NOTE — TELEPHONE ENCOUNTER
Fax received from [employer] approval for MRI [id number] valid 9/4/24 through 3/3/25 and it looks like pt is already scheduled will place in scanning.

## 2024-09-06 RX ORDER — AMLODIPINE BESYLATE 10 MG/1
10 TABLET ORAL DAILY
Qty: 90 TABLET | Refills: 3 | Status: SHIPPED | OUTPATIENT
Start: 2024-09-06

## 2024-09-06 NOTE — TELEPHONE ENCOUNTER
Refill request is for a maintenance medication and has met the criteria specified in the Ambulatory Medication Refill Standing Order for eligibility, visits, laboratory, alerts and was sent to the requested pharmacy.    Requested Prescriptions     Signed Prescriptions Disp Refills    amLODIPine 10 MG Oral Tab 90 tablet 3     Sig: Take 1 tablet (10 mg total) by mouth daily.     Authorizing Provider: ALESSANDRA WREN     Ordering User: DHRUV JARRETT

## 2024-09-06 NOTE — TELEPHONE ENCOUNTER
Kashif is concerned about his decreased mobility.  Still losing weight, abdominal pain not as bad.  Recall that he has hesitations regarding proceeding with an angiogram and invasive testing.  He will think about the stress test that was originally discussed.  It is possible that if this is a normal test, he may be able to proceed with endoscopy and colonoscopy as part of his weight loss and iron deficiency workup.  He will continue to think about it for now, as he is questioning if he should just leave things as is.    Discussed with an abnormal stress test that angiogram would be preferred in the event there is blockages that are contributing to his symptoms.  However due to the had told me on our prior discussion that he had a difficult time with recovery from his last stent placement.    We are also considering if this is a malignancy in the gastrointestinal tract that is not seen on CT scan.  Endoscopy would be of the test of choice for this.  Aware that if we delay on this further, that potential malignancy could progress    Ely would like to hold on this for now he will think about it through the weekend and we will discuss again next week

## 2024-09-19 NOTE — PROCEDURE: SUPERFICIAL RADIATION TREATMENT
Patient requests all Lab, Cardiology, and Radiology Results on their Discharge Instructions Simple Simulation Afterword Text Will Be Included With Simple Simulations (Indications............): The patient had a complete consultation regarding all applicable modalities for the treatment of their skin cancer and based on a variety of factors including the type of tumor, size, and location, the relevant medical history as well as local tissue factors, the functional status of the individual, the ability to perform necessary postoperative wound instructions and the need for simultaneous treatments as well as overall wound healing status, it was determined that the patient would begin radiation therapy treatment for skin cancer.  A full simulation and treatment device design was performed including the determination and formulation of appropriate simple and complex devices including lead shield of 0.762 mm thickness to form molded customized shielding to specifically correlate with the lesion size including treatment margin.  The custom lead shield is adequate to accommodate the appropriate applicator and provide adequate shielding around the treatment site.  The specific field applicator, shields, and devices both simple and complex as well as the specific patient setup is outlined below.  The patient was given a full consent for superficial radiation to both verbally and in writing and the full determination of patient's eligibility for treatment and selection is outlined on the patient eligibility and treatment selection form.  The specific superficial radiotherapy prescription was determined and was documented on the superficial radiotherapy prescription form.  A treatment calculation was also performed and documented on the treatment calculation form.  Based on the prescription, the patient was scheduled for a series of fractional treatments.

## 2024-10-07 ENCOUNTER — HOSPITAL ENCOUNTER (OUTPATIENT)
Dept: MRI IMAGING | Facility: HOSPITAL | Age: 84
Discharge: HOME | End: 2024-10-07
Attending: UROLOGY
Payer: MEDICARE

## 2024-10-07 DIAGNOSIS — E27.8 ADRENAL MASS (HCC): ICD-10-CM

## 2024-10-07 PROCEDURE — A9575 INJ GADOTERATE MEGLUMI 0.1ML: HCPCS | Performed by: UROLOGY

## 2024-10-07 PROCEDURE — 74183 MRI ABD W/O CNTR FLWD CNTR: CPT | Performed by: UROLOGY

## 2024-10-07 RX ORDER — GADOTERATE MEGLUMINE 376.9 MG/ML
15 INJECTION INTRAVENOUS
Status: COMPLETED | OUTPATIENT
Start: 2024-10-07 | End: 2024-10-07

## 2024-10-07 RX ADMIN — GADOTERATE MEGLUMINE 14 ML: 376.9 INJECTION INTRAVENOUS at 12:52:00

## 2024-10-07 NOTE — TELEPHONE ENCOUNTER
To , please with me after he completes his MRI 10/9:    Tuesday 10/15  OK to Double Book: 4:30 pm  Thursday 10/17   OK to Double Book: 3:30 pm  Monday 10/21   OK to Double Book: 3:30 pm, 4 pm  Tuesday 10/22   OK to Double Book: 3:30 pm  Thursday 10/24   Same Day Sick: 4 pm  Friday 10/25   OK to Double Book: 10:30 am  Same Day Sick: 1:30 pm  Please let me know what he selects.

## 2024-10-09 ENCOUNTER — HOSPITAL ENCOUNTER (OUTPATIENT)
Dept: MRI IMAGING | Facility: HOSPITAL | Age: 84
Discharge: HOME | End: 2024-10-09
Attending: UROLOGY
Payer: MEDICARE

## 2024-10-09 DIAGNOSIS — R97.20 ELEVATED PSA: ICD-10-CM

## 2024-10-09 DIAGNOSIS — N40.2 NODULAR PROSTATE: ICD-10-CM

## 2024-10-09 PROCEDURE — 72197 MRI PELVIS W/O & W/DYE: CPT | Performed by: UROLOGY

## 2024-10-09 PROCEDURE — A9575 INJ GADOTERATE MEGLUMI 0.1ML: HCPCS | Performed by: UROLOGY

## 2024-10-09 RX ORDER — GADOTERATE MEGLUMINE 376.9 MG/ML
15 INJECTION INTRAVENOUS
Status: COMPLETED | OUTPATIENT
Start: 2024-10-09 | End: 2024-10-09

## 2024-10-09 RX ADMIN — GADOTERATE MEGLUMINE 4 ML: 376.9 INJECTION INTRAVENOUS at 13:02:00

## 2024-10-13 NOTE — PROGRESS NOTES
Chief Complaint:   Chief Complaint   Patient presents with    Checkup         HPI:     Mr. LUBIN is a 84 year old male PMHX coronary artery disease, type 2 diabetes, hypertension, hyperlipidemia, peripheral polyneuropathy, IBS, GERD, lumbar spinal stenosis with chronic back pain, BPH, stage III CKD coming in for follow-up.    Food intake is slightly better than it has been - cookies cereal yogurt, dinner - steak, baked potatoes, spinach, green beans, carrots, sweet potatoes, chicken, fish.  Portions is smaller but he eats the whole plate. Not a lot of water. Orange juice, water occasion. Coffee.    Comes and goes pain, afternoon 3 o'clock across the abdomen. No constipation. Sometimes diarrhea. COuple times a week has diarrhea.     Sleeping is OK. 3.5-4.5 hours of sleep. He does take naps.    Shanice notices that he is better with intake. Still not eating a lot.  [place] patties and potato egg omelette.     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 and will require a pediatric ET tube if procedure needed    Past Medical History:    Allergic rhinitis    Cataract of both eyes    Cholelithiasis    Depression    Elevated PSA    Hemorrhoids    High triglycerides    IBS (irritable bowel syndrome)    No diabetic retinopathy OU    Osteoarthrosis, unspecified whether generalized or localized, unspecified site    Spinal stenosis    Torn tendon    Right shoulder    Type II or unspecified type diabetes mellitus without mention of complication, not stated as uncontrolled    Unspecified essential hypertension    Vitreous floaters of both eyes     Past Surgical History:   Procedure Laterality Date    Angioplasty (coronary)  01/01/2011    + stent(s)    Cataract extraction w/  intraocular lens implant Right 03/16/2023    Dr. Gilmore done @ [facility]     Social History:  Social History     Socioeconomic History    Marital status:    Tobacco Use    Smoking status: Never    Smokeless tobacco: Never   Vaping Use    Vaping status:  Never Used   Substance and Sexual Activity    Alcohol use: Yes     Comment: rarely    Drug use: No   Other Topics Concern    Caffeine Concern Yes     Comment: Coffee 1 cup daily    Exercise Yes   Social History Narrative    The patient does not use an assistive device..      The patient does live in a home with stairs.     Family History:  Family History   Problem Relation Age of Onset    Heart Disease Father     Stroke Mother     Pulmonary Disease Brother     Diabetes Brother     Heart Attack Daughter 55    Glaucoma Neg     Macular degeneration Neg      Allergies:  Allergies   Allergen Reactions    Ace Inhibitors Coughing     Other reaction(s): Cough     Current Meds:  Current Outpatient Medications   Medication Sig Dispense Refill    amLODIPine 10 MG Oral Tab Take 1 tablet (10 mg total) by mouth daily. 90 tablet 3    memantine 5 MG Oral Tab Take 1 tablet (5 mg total) by mouth daily. 90 tablet 1    ipratropium 0.06 % Nasal Solution 2 sprays by Nasal route 4 (four) times daily. 60 mL 3    zolpidem 10 MG Oral Tab Take 1 tablet (10 mg total) by mouth nightly as needed for Sleep. 90 tablet 1    metFORMIN HCl 1000 MG Oral Tab Take 1 tablet (1,000 mg total) by mouth 2 (two) times daily with meals. 180 tablet 3    celecoxib 200 MG Oral Cap Take 1 capsule (200 mg total) by mouth daily. 90 capsule 3    folic acid 1 MG Oral Tab Take 1 tablet (1 mg total) by mouth daily. 90 tablet 1    LOSARTAN 100 MG Oral Tab TAKE 1 TABLET DAILY 90 tablet 3    METOPROLOL SUCCINATE ER 25 MG Oral Tablet 24 Hr TAKE 1 TABLET DAILY 90 tablet 3    dutasteride 0.5 MG Oral Cap Take 1 capsule (0.5 mg total) by mouth daily. 90 capsule 3    TAMSULOSIN 0.4 MG Oral Cap TAKE 1 CAPSULE DAILY 90 capsule 3    atorvastatin 20 MG Oral Tab Take 1 tablet (20 mg total) by mouth daily.      donepezil (ARICEPT) 10 MG Oral Tab Take 1 tablet (10 mg total) by mouth nightly. 90 tablet 3    FAMOTIDINE 20 MG Oral Tab TAKE 1 TABLET BY MOUTH TWICE A  tablet 3     DULOXETINE 30 MG Oral Cap DR Particles TAKE 1 CAPSULE DAILY 90 capsule 3    cetirizine 10 MG Oral Tab Take 1 tablet (10 mg total) by mouth daily.      Cholecalciferol (VITAMIN D3) 25 MCG (1000 UT) Oral Cap Take 1 tablet by mouth daily. 2000 units daily      LORAZEPAM 0.5 MG Oral Tab TAKE 1 TABLET BY MOUTH TWICE A DAY AS NEEDED 30 tablet 5    Coenzyme Q10 (CO Q-10) 200 MG Oral Cap Take 1 tablet by mouth daily.      aspirin 81 MG Oral Tab EC Take 1 tablet (81 mg total) by mouth daily.        Counseling given: Not Answered       REVIEW OF SYSTEMS:   Positive Findings indicated in BOLD    Constitutional: Fever, Chills, Weight Gain, Weight Loss - stable, Night Sweats, Fatigue, Malaise  ENT/Mouth:  Hearing Changes, Ear Pain, Nasal Congestion, Sinus Pain, Hoarseness, Sore throat, Rhinorrhea, Swallowing Difficulty  Eyes: Eye Pain, Swelling, Redness, Foreign Body, Discharge, Vision Changes  Cardiovascular: Chest Pain, SOB, PND, Dyspnea on Exertion, Orthopnea, Claudication, Edema, Palpitations  Respiratory: Cough, Sputum, Wheezing, Shortness of breath  Gastrointestinal: Nausea, Vomiting, Diarrhea, Constipation, Pain, Heartburn, Dysphagia, Bloody stools, Tarry stools  Genitourinary: Dysmenorrhea, Dysuria, Urinary Frequency, Hematuria, Urinary Incontinence, Urgency,  Flank Pain  Musculoskeletal: Arthralgias, Myalgias, Joint Swelling, Joint Stiffness, Back Pain, Neck Pain  Integumentary: Skin Lesions, Pruritis, Hair Changes, Jaundice, Nail changes  Neuro: Weakness, Numbness, Paresthesias, Loss of Consciousness, Syncope, Dizziness, Headache, Falls  Psych: Anxiety, Depression, Insomnia, Suicidal Ideation, Homicidal ideation, Memory Changes  Heme/Lymph: Bruising, Bleeding, Lymphadenopathy  Endocrine: Polyuria, Polydipsia, Temperature Intolerance    EXAM:   Vital Signs:  Blood pressure 140/70, pulse 60, temperature 97.4 °F (36.3 °C), temperature source Oral, height 5' 7\" (1.702 m), weight 157 lb (71.2 kg).     Constitutional: No  acute distress. Alert and oriented x 3.  Eyes: EOMI, PERRLA, clear sclera b/l  HENT: NCAT, Moist mucous membranes, Oropharynx without erythema or exudates  Cardiovascular: S1, S2, no S3, no S4, Regular rate and rhythm, No murmurs/gallops/rubs.   Respiratory: Clear to auscultation bilaterally.  No wheezes/rales/rhonchi  Gastrointestinal: Soft, nontender, nondistended. Positive bowel sounds x 4. No rebound tenderness. No hepatomegaly, No splenomegaly  Genitourinary: No CVA tenderness bilaterally  Neurologic: No focal neurological deficits, CN II-XII intact, light touch intact, MSK Strength 5/5 and symmetric in all extremities, normal gait, 2+ patellar tendon  Musculoskeletal: Full range of motion of all extremities, no clubbing/swelling/edema  Skin: No lesions, No erythema, no jaundice, Cap Refill < 2s  Psychiatric: Appropriate mood and affect  Heme/Lymph/Immune: No cervical LAD          DATA REVIEWED   Labs:  Recent Results (from the past 8760 hours)   Comp Metabolic Panel (14) [E]    Collection Time: 08/14/24 10:31 AM   Result Value Ref Range    Glucose 224 (H) 70 - 99 mg/dL    Sodium 143 136 - 145 mmol/L    Potassium 4.1 3.5 - 5.1 mmol/L    Chloride 106 98 - 112 mmol/L    CO2 30.0 21.0 - 32.0 mmol/L    Anion Gap 7 0 - 18 mmol/L    BUN 12 9 - 23 mg/dL    Creatinine 1.21 0.70 - 1.30 mg/dL    BUN/CREA Ratio 9.9 (L) 10.0 - 20.0    Calcium, Total 9.8 8.7 - 10.4 mg/dL    Calculated Osmolality 303 (H) 275 - 295 mOsm/kg    eGFR-Cr 59 (L) >=60 mL/min/1.73m2    ALT 8 (L) 10 - 49 U/L    AST 19 <34 U/L    Alkaline Phosphatase 70 45 - 117 U/L    Bilirubin, Total 0.5 0.2 - 1.1 mg/dL    Total Protein 6.3 5.7 - 8.2 g/dL    Albumin 4.1 3.2 - 4.8 g/dL    Globulin  2.2 2.0 - 3.5 g/dL    A/G Ratio 1.9 1.0 - 2.0    Patient Fasting for CMP? No      *Note: Due to a large number of results and/or encounters for the requested time period, some results have not been displayed. A complete set of results can be found in Results Review.        Recent Results (from the past 8760 hours)   CBC W Differential W Platelet [E]    Collection Time: 08/14/24 10:31 AM   Result Value Ref Range    WBC 5.8 4.0 - 11.0 x10(3) uL    RBC 3.97 3.80 - 5.80 x10(6)uL    HGB 11.2 (L) 13.0 - 17.5 g/dL    HCT 34.4 (L) 39.0 - 53.0 %    MCV 86.6 80.0 - 100.0 fL    MCH 28.2 26.0 - 34.0 pg    MCHC 32.6 31.0 - 37.0 g/dL    RDW-SD 51.7 (H) 35.1 - 46.3 fL    RDW 16.3 (H) 11.0 - 15.0 %    .0 150.0 - 450.0 10(3)uL    Immature Platelet Fraction 3.2 0.0 - 7.0 %    Neutrophil Absolute Prelim 3.52 1.50 - 7.70 x10 (3) uL    Neutrophil Absolute 3.52 1.50 - 7.70 x10(3) uL    Lymphocyte Absolute 1.66 1.00 - 4.00 x10(3) uL    Monocyte Absolute 0.47 0.10 - 1.00 x10(3) uL    Eosinophil Absolute 0.15 0.00 - 0.70 x10(3) uL    Basophil Absolute 0.01 0.00 - 0.20 x10(3) uL    Immature Granulocyte Absolute 0.01 0.00 - 1.00 x10(3) uL    Neutrophil % 60.4 %    Lymphocyte % 28.5 %    Monocyte % 8.1 %    Eosinophil % 2.6 %    Basophil % 0.2 %    Immature Granulocyte % 0.2 %     *Note: Due to a large number of results and/or encounters for the requested time period, some results have not been displayed. A complete set of results can be found in Results Review.     MRI abdomen and pelvis 10/8/2024    Impression   CONCLUSION:  1. Stable small right adrenal nodule, which is characterized as a benign adenoma.  2. Cholelithiasis.  3. Stable simple hepatic and renal cysts.  4. Lesser incidental findings as above.          MRI prostate 10/9/2024  Impression   CONCLUSION:  1. Limited MRI of the prostate gland due to technical difficulties with the IV contrast injection.    2. Two small lesions in the peripheral zone at the prostate apex, both measuring approximately 1.0 cm in size.  These include a lesion at 5:30 and a lesion at 7 o'clock.  PI-RADS CLASSIFICATION:  PI-RADS 4 - High (clinically significant cancer is likely  to be present).  Note that PI-RADS classification of these lesions would not change  with IV contrast.    3. Additional approximately 1.0 cm sized lesion in the peripheral zone at 7 o'clock at the base of the prostate gland that is more indeterminate.  PI-RADS CLASSIFICATION:  PI-RADS 3 - Intermediate (the presence of clinically significant cancer is  equivocal).  Note this lesion could potentially be upgraded to a PI-RADS 4 lesion if IV contrast demonstrated suspicious enhancement characteristics.  4. No evidence of extracapsular extension of tumor, pelvic lymphadenopathy or osseous metastases.      5. Prostate gland is moderately enlarged and demonstrates benign prostatic hypertrophy.       ASSESSMENT AND PLAN:     Weight loss with anorexia  Patient has been losing weight over time.  Patient is lost 6 pounds since he was here in March.  In the past patient weighed around 200 to 205 pounds.  Patient is now down to 157 pounds.  - On Dr. Mendiola's initial evaluation, he is concerned about occult malignancy given significant weight loss unintentionally, significant anemia, complaints of abdominal pain, elevated PSA  - Noted multiple nutritional deficiencies and iron deficiency, folic acid deficiency, borderline vitamin B12 deficiency  - MRI of the prostate did show lesions PI-RADS 4 in the peripheral zone of the prostate apex, PI-RADS 3 the limited without use of IV contrast.  Clinically significant cancer likely to be present.  Has follow-up with urology, Dr. Barclay  -Recall that colonoscopy was being arranged as part of our workup.  However, he would like to hold off on this    ASHD (arteriosclerotic heart disease)  History of PCI of LAD and RCA more than 10 years ago  - Undergoing Lexiscan stress test with Dr. Wade  -Planning for repeat echocardiogram on an annual basis  - On aspirin 81 mg daily, control of cholesterol as below  -Discussed with patient and his wife Shanice, we should proceed with noninvasive cardiac workup that was recommended back in March by Dr. Wade.  We need a stress test,  echocardiogram.  Aware that if there is any abnormalities given his heart history, cardiac catheterization may be indicated.  May need to follow-up with cardiology regarding this.  - Would advise cardiac clearance prior to any potential invasive procedures in the near future     Type 2 diabetes mellitus without complication, without long-term current use of insulin ([facility])  Recent A1c:   HgbA1C (%)   Date Value   06/17/2024 6.0 (H)     Recent urine microalbumin: No components found for: \"URINEMICROALBUMIN\"  Current medications: 1000 mg metformin twice a day  Eye exam: Due for diabetic eye exam  Foot exam: Check feet daily  - Plan to repeat hemoglobin A1c    Hypertension  -Blood pressure today at goal  - Check blood pressures at home  - Continue with home amlodipine 10 mg daily, losartan 100 mg daily, metoprolol succinate 25 mg daily     Hyperlipidemia  -Last lipid panel remains at goal  - Repeat fasting lipid panel at next visit  - Continue with atorvastatin 20 mg daily      Mitral valve insufficiency, unspecified etiology  Aortic valve stenosis, etiology of cardiac valve disease unspecified  -Being monitored by cardiology, Dr. Wade, last TTE with moderate mitral regurgitation but normal left ventricular ejection fraction and chamber size.  Plan to repeat TTE ED annually     Peripheral polyneuropathy  -Consider effects of diabetic polyneuropathy.  Should also consider vitamin B12 relative deficiency as we are on the lower end of normal  - We will continue glycemic control, vitamin B12 supplementation as below     Gastroesophageal reflux disease, unspecified whether esophagitis present  Irritable bowel syndrome without diarrhea  -On famotidine 20 mg twice a day  - May benefit from H. pylori evaluation.  Will obtain a breath test    Stage 3a chronic kidney disease (HCC)  Patient's recent creatinine 1.21, EGFR 59 seems to be stable  -Will monitor closely  - Continue with adequate water intake     Anemia, unspecified  type  Thrombocytopenia  Patient has a new onset anemia that has been gradually getting worse.  Patient's recent CBC showed the hemoglobin to be 10.5, hematocrit 32.1 and WBC to be 7000 with a platelet count of 120,000.    -Anemia workup was remarkable for iron deficiency and folic acid deficiency.  Vitamin B12 is on the lower end of normal  - Would recommend folic acid supplementation 1 mg daily  - Would recommend vitamin B12/cyanocobalamin 1000 mcg once a day  - Will start ferrous sulfate 325 mg once a day  -We will repeat CBC, iron studies, vitamin levels to monitor supplementation     Prostate nodule  BPH with LUTS  Patient has a prostate nodule involving the left lobe of his prostate which is suspicious for prostate cancer.  Patient will have a diagnostic PSA.  -PSA elevated 7.50  - For concern of prostate cancer, does require imaging ideally with CT scan  - Established with urology, Dr. Barclay as above    Spinal stenosis of lumbar region, unspecified whether neurogenic claudication   Chronic bilateral low back pain without sciatica  Patient complains of much pain in his low back region.  He also has some pain in the upper back region.  -Acetaminophen 500-650 mg every 4-6 hours as needed for pain relief  - Should avoid NSAIDs given history of chronic kidney disease  - On duloxetine 30 mg daily  - Notify us if pain symptoms worsen over time    Memory changes  - May be age-related, however there are likely some contribution with vitamin B12 deficiency  - We will proceed with vitamin B12 supplementation, 1000 mcg once a day  - Plan to repeat vitamin B12    Onychomycosis  - If LFTs stable, we will proceed with terbinafine daily for 6 weeks.       Orders This Visit:  Orders Placed This Encounter   Procedures    CBC With Differential With Platelet    Comp Metabolic Panel (14)    Ferritin    Folic Acid Serum (Folate)    Vitamin B12    C-Reactive Protein    Iron And Tibc       Meds This Visit:  Requested Prescriptions       No prescriptions requested or ordered in this encounter       Imaging & Referrals:  [facility] CARDIOLOGY EXTERNAL  XR SHOULDER, COMPLETE (MIN 2 VIEWS), RIGHT (CPT=73030)     Health Maintenance  Last Medicare physical 6/19/2024  - Due for dilated eye exam for diabetes  - COVID dose 8 due    Spent 45 minutes obtaining history, evaluating patient, discussing treatment options, diet, exercise, review of available labs and radiology reports, and completing documentation.       Return to clinic in 8 weeks for follow-up    Jose Angel Macias MD, 10/15/24, 6:34 PM

## 2024-10-13 NOTE — PATIENT INSTRUCTIONS
You are seen in clinic today for follow-up.  Today, we did review her most recent workup.  - MRI did show clinically significant lesions concerning for prostate cancer.  - We will need to determine next steps with urology, Dr. Barclay     However this does require cardiac optimization with stress test, echocardiogram.  There is a possibility for need of cardiac catheterization however.  - We will repeat some blood test to monitor your vitamin, mineral levels  - Lets continue with keeping up the calorie intake with good nutrition, hydration    Follow-up with cardiology to determine need for testing    As discussed, colonoscopy may need to be included with our workup.     Once we get your labs completed, we will prescribe terbinafine 1 tablet daily for up to 6 weeks.    Periodically check your blood pressures at home    Periodically check your blood sugars at home    Return to clinic in 8 weeks for follow-up.

## 2024-10-15 ENCOUNTER — OFFICE VISIT (OUTPATIENT)
Dept: INTERNAL MEDICINE CLINIC | Facility: CLINIC | Age: 84
End: 2024-10-15

## 2024-10-15 VITALS
BODY MASS INDEX: 24.64 KG/M2 | DIASTOLIC BLOOD PRESSURE: 70 MMHG | SYSTOLIC BLOOD PRESSURE: 140 MMHG | TEMPERATURE: 97 F | HEART RATE: 60 BPM | HEIGHT: 67 IN | WEIGHT: 157 LBS

## 2024-10-15 DIAGNOSIS — N40.2 PROSTATE NODULE: ICD-10-CM

## 2024-10-15 DIAGNOSIS — M15.0 PRIMARY OSTEOARTHRITIS INVOLVING MULTIPLE JOINTS: ICD-10-CM

## 2024-10-15 DIAGNOSIS — I10 ESSENTIAL HYPERTENSION: ICD-10-CM

## 2024-10-15 DIAGNOSIS — R63.4 UNINTENTIONAL WEIGHT LOSS: Primary | ICD-10-CM

## 2024-10-15 DIAGNOSIS — M25.511 CHRONIC RIGHT SHOULDER PAIN: ICD-10-CM

## 2024-10-15 DIAGNOSIS — M54.50 CHRONIC BILATERAL LOW BACK PAIN WITHOUT SCIATICA: ICD-10-CM

## 2024-10-15 DIAGNOSIS — G62.9 PERIPHERAL POLYNEUROPATHY: ICD-10-CM

## 2024-10-15 DIAGNOSIS — E53.8 FOLATE DEFICIENCY: ICD-10-CM

## 2024-10-15 DIAGNOSIS — G89.29 CHRONIC RIGHT SHOULDER PAIN: ICD-10-CM

## 2024-10-15 DIAGNOSIS — E11.9 TYPE 2 DIABETES MELLITUS WITHOUT COMPLICATION, WITHOUT LONG-TERM CURRENT USE OF INSULIN (HCC): ICD-10-CM

## 2024-10-15 DIAGNOSIS — I25.10 ASHD (ARTERIOSCLEROTIC HEART DISEASE): ICD-10-CM

## 2024-10-15 DIAGNOSIS — D69.6 THROMBOCYTOPENIA (HCC): ICD-10-CM

## 2024-10-15 DIAGNOSIS — I35.0 AORTIC VALVE STENOSIS, ETIOLOGY OF CARDIAC VALVE DISEASE UNSPECIFIED: ICD-10-CM

## 2024-10-15 DIAGNOSIS — Z12.11 SCREENING FOR COLON CANCER: ICD-10-CM

## 2024-10-15 DIAGNOSIS — E78.1 HYPERTRIGLYCERIDEMIA: ICD-10-CM

## 2024-10-15 DIAGNOSIS — R41.3 MEMORY PROBLEM: ICD-10-CM

## 2024-10-15 DIAGNOSIS — K21.9 GASTROESOPHAGEAL REFLUX DISEASE, UNSPECIFIED WHETHER ESOPHAGITIS PRESENT: ICD-10-CM

## 2024-10-15 DIAGNOSIS — K58.9 IRRITABLE BOWEL SYNDROME WITHOUT DIARRHEA: ICD-10-CM

## 2024-10-15 DIAGNOSIS — D64.9 ANEMIA, UNSPECIFIED TYPE: ICD-10-CM

## 2024-10-15 DIAGNOSIS — E53.8 VITAMIN B12 DEFICIENCY: ICD-10-CM

## 2024-10-15 DIAGNOSIS — R53.83 FATIGUE, UNSPECIFIED TYPE: ICD-10-CM

## 2024-10-15 DIAGNOSIS — G89.29 CHRONIC BILATERAL LOW BACK PAIN WITHOUT SCIATICA: ICD-10-CM

## 2024-10-15 DIAGNOSIS — N18.31 STAGE 3A CHRONIC KIDNEY DISEASE (HCC): ICD-10-CM

## 2024-10-15 PROCEDURE — 99215 OFFICE O/P EST HI 40 MIN: CPT | Performed by: INTERNAL MEDICINE

## 2024-10-15 PROCEDURE — 99499 UNLISTED E&M SERVICE: CPT | Performed by: INTERNAL MEDICINE

## 2024-10-17 ENCOUNTER — LAB ENCOUNTER (OUTPATIENT)
Dept: LAB | Age: 84
End: 2024-10-17
Attending: INTERNAL MEDICINE
Payer: MEDICARE

## 2024-10-17 ENCOUNTER — HOSPITAL ENCOUNTER (OUTPATIENT)
Dept: GENERAL RADIOLOGY | Age: 84
Discharge: HOME | End: 2024-10-17
Attending: INTERNAL MEDICINE
Payer: MEDICARE

## 2024-10-17 DIAGNOSIS — D69.6 THROMBOCYTOPENIA (HCC): ICD-10-CM

## 2024-10-17 DIAGNOSIS — R53.83 FATIGUE, UNSPECIFIED TYPE: ICD-10-CM

## 2024-10-17 DIAGNOSIS — E11.9 TYPE 2 DIABETES MELLITUS WITHOUT COMPLICATION, WITHOUT LONG-TERM CURRENT USE OF INSULIN (HCC): ICD-10-CM

## 2024-10-17 DIAGNOSIS — R63.4 UNINTENTIONAL WEIGHT LOSS: ICD-10-CM

## 2024-10-17 DIAGNOSIS — E53.8 VITAMIN B12 DEFICIENCY: ICD-10-CM

## 2024-10-17 DIAGNOSIS — G89.29 CHRONIC RIGHT SHOULDER PAIN: ICD-10-CM

## 2024-10-17 DIAGNOSIS — E53.8 FOLATE DEFICIENCY: ICD-10-CM

## 2024-10-17 DIAGNOSIS — M25.511 CHRONIC RIGHT SHOULDER PAIN: ICD-10-CM

## 2024-10-17 DIAGNOSIS — D64.9 ANEMIA, UNSPECIFIED TYPE: ICD-10-CM

## 2024-10-17 DIAGNOSIS — G62.9 PERIPHERAL POLYNEUROPATHY: ICD-10-CM

## 2024-10-17 DIAGNOSIS — N18.31 STAGE 3A CHRONIC KIDNEY DISEASE (HCC): ICD-10-CM

## 2024-10-17 LAB
ALBUMIN SERPL-MCNC: 4.5 G/DL (ref 3.2–4.8)
ALBUMIN/GLOB SERPL: 2 {RATIO} (ref 1–2)
ALP LIVER SERPL-CCNC: 79 U/L
ALT SERPL-CCNC: 13 U/L
ANION GAP SERPL CALC-SCNC: 5 MMOL/L (ref 0–18)
AST SERPL-CCNC: 18 U/L (ref ?–34)
BASOPHILS # BLD AUTO: 0.01 X10(3) UL (ref 0–0.2)
BASOPHILS NFR BLD AUTO: 0.1 %
BILIRUB SERPL-MCNC: 0.5 MG/DL (ref 0.2–1.1)
BUN BLD-MCNC: 14 MG/DL (ref 9–23)
BUN/CREAT SERPL: 12.5 (ref 10–20)
CALCIUM BLD-MCNC: 10.2 MG/DL (ref 8.7–10.4)
CHLORIDE SERPL-SCNC: 105 MMOL/L (ref 98–112)
CO2 SERPL-SCNC: 30 MMOL/L (ref 21–32)
CREAT BLD-MCNC: 1.12 MG/DL
CRP SERPL-MCNC: <0.4 MG/DL (ref ?–1)
DEPRECATED HBV CORE AB SER IA-ACNC: 13 NG/ML
DEPRECATED RDW RBC AUTO: 47.8 FL (ref 35.1–46.3)
EGFRCR SERPLBLD CKD-EPI 2021: 64 ML/MIN/1.73M2 (ref 60–?)
EOSINOPHIL # BLD AUTO: 0.16 X10(3) UL (ref 0–0.7)
EOSINOPHIL NFR BLD AUTO: 2.1 %
ERYTHROCYTE [DISTWIDTH] IN BLOOD BY AUTOMATED COUNT: 14.9 % (ref 11–15)
FASTING STATUS PATIENT QL REPORTED: NO
FOLATE SERPL-MCNC: >48 NG/ML (ref 5.4–?)
GLOBULIN PLAS-MCNC: 2.3 G/DL (ref 2–3.5)
GLUCOSE BLD-MCNC: 205 MG/DL (ref 70–99)
HCT VFR BLD AUTO: 38.2 %
HGB BLD-MCNC: 13 G/DL
IMM GRANULOCYTES # BLD AUTO: 0.03 X10(3) UL (ref 0–1)
IMM GRANULOCYTES NFR BLD: 0.4 %
IRON SATN MFR SERPL: 20 %
IRON SERPL-MCNC: 83 UG/DL
LYMPHOCYTES # BLD AUTO: 1.79 X10(3) UL (ref 1–4)
LYMPHOCYTES NFR BLD AUTO: 24 %
MCH RBC QN AUTO: 29.7 PG (ref 26–34)
MCHC RBC AUTO-ENTMCNC: 34 G/DL (ref 31–37)
MCV RBC AUTO: 87.4 FL
MONOCYTES # BLD AUTO: 0.71 X10(3) UL (ref 0.1–1)
MONOCYTES NFR BLD AUTO: 9.5 %
NEUTROPHILS # BLD AUTO: 4.75 X10 (3) UL (ref 1.5–7.7)
NEUTROPHILS # BLD AUTO: 4.75 X10(3) UL (ref 1.5–7.7)
NEUTROPHILS NFR BLD AUTO: 63.9 %
OSMOLALITY SERPL CALC.SUM OF ELEC: 296 MOSM/KG (ref 275–295)
PLATELET # BLD AUTO: 112 10(3)UL (ref 150–450)
POTASSIUM SERPL-SCNC: 3.9 MMOL/L (ref 3.5–5.1)
PROT SERPL-MCNC: 6.8 G/DL (ref 5.7–8.2)
RBC # BLD AUTO: 4.37 X10(6)UL
SODIUM SERPL-SCNC: 140 MMOL/L (ref 136–145)
TIBC SERPL-MCNC: 410 UG/DL (ref 250–425)
TRANSFERRIN SERPL-MCNC: 275 MG/DL (ref 215–365)
VIT B12 SERPL-MCNC: 681 PG/ML (ref 211–911)
WBC # BLD AUTO: 7.5 X10(3) UL (ref 4–11)

## 2024-10-17 PROCEDURE — 86140 C-REACTIVE PROTEIN: CPT

## 2024-10-17 PROCEDURE — 83540 ASSAY OF IRON: CPT

## 2024-10-17 PROCEDURE — 36415 COLL VENOUS BLD VENIPUNCTURE: CPT

## 2024-10-17 PROCEDURE — 85025 COMPLETE CBC W/AUTO DIFF WBC: CPT

## 2024-10-17 PROCEDURE — 82728 ASSAY OF FERRITIN: CPT

## 2024-10-17 PROCEDURE — 73030 X-RAY EXAM OF SHOULDER: CPT | Performed by: INTERNAL MEDICINE

## 2024-10-17 PROCEDURE — 80053 COMPREHEN METABOLIC PANEL: CPT

## 2024-10-17 PROCEDURE — 82746 ASSAY OF FOLIC ACID SERUM: CPT

## 2024-10-17 PROCEDURE — 84466 ASSAY OF TRANSFERRIN: CPT

## 2024-10-17 PROCEDURE — 82607 VITAMIN B-12: CPT

## 2024-10-21 ENCOUNTER — TELEPHONE (OUTPATIENT)
Dept: INTERNAL MEDICINE CLINIC | Facility: CLINIC | Age: 84
End: 2024-10-21

## 2024-10-21 RX ORDER — FERROUS SULFATE 325(65) MG
325 TABLET ORAL
Qty: 45 TABLET | Refills: 1 | Status: SHIPPED | OUTPATIENT
Start: 2024-10-21 | End: 2025-04-19

## 2024-10-21 NOTE — TELEPHONE ENCOUNTER
S/w patient's spouse/Shanice and relayed MD message.  Verbalizes understanding and agreement with tx. plan   HIPAA verified

## 2024-10-21 NOTE — TELEPHONE ENCOUNTER
Please refer the patient reviewed the blood work from 10/17  - Numbers are improving however we should continue:    Iron supplements ferrous sulfate 325 mg every 48 hours as his iron levels are down.      Continue with vitamin B12 supplements cyanocobalamin, increase to 2000 units once a day    He can discontinue folic acid as this has improved    Chest x-ray showed arthritis of the right shoulder with tendinitis.  We should continue with home stretches and exercises, pain management as needed    I sent the vitamins to his pharmacy.  He should make an appointment with cardiology as we discussed in clinic.

## 2024-10-22 ENCOUNTER — OFFICE VISIT (OUTPATIENT)
Dept: SURGERY | Facility: CLINIC | Age: 84
End: 2024-10-22
Payer: MEDICARE

## 2024-10-22 DIAGNOSIS — N40.2 NODULAR PROSTATE: ICD-10-CM

## 2024-10-22 DIAGNOSIS — R97.20 ELEVATED PSA: Primary | ICD-10-CM

## 2024-10-22 DIAGNOSIS — E27.8 ADRENAL MASS (HCC): ICD-10-CM

## 2024-10-22 PROCEDURE — 99214 OFFICE O/P EST MOD 30 MIN: CPT | Performed by: UROLOGY

## 2024-10-22 NOTE — PROGRESS NOTES
Chart reviewed for discharge planning needs an noted that spouse now interested in [facility] placement at discharge.    Referral sent via [facility]    / to remain available for support and/or discharge planning.     Althea Frias MBA MSN, RN CTL/  [id number]     Kashif Suero is a 84 year old male.    HPI:     Chief Complaint   Patient presents with    Follow - Up     PT presents for follow up visit to [facility].        84-year-old male accompanied by his wife in follow-up to visit July 22, 2024.  He was referred originally for BPH symptoms, nodular prostate, elevated PSA, unexplained weight loss of 10 to 15 pounds and poor appetite.  Since have seen the patient in July he states his appetite is improved.  He reports regaining about 5 or 6 pounds.  Wife confirms this.  He denies any bothersome urination symptoms and has an IPSS score of 9 quality-of-life index of 2.  No gross hematuria or dysuria otherwise.  Digital prostate exam performed at last visit demonstrated a 60 g firm prostate diffusely with nodules bilaterally.  His PSA June 2024 had been 7.5 on a corrective scale as the patient is on finasteride 15.    I referred the patient for an MRI of the prostate as well as an MRI of the adrenal gland as he had a previous imaging study that demonstrated a right adrenal nodule.  Prostate MRI October 9, 2024 demonstrates limitations due to contrast administration but the radiologist report suggest that this should not impact the PI-RADS classification.  In brief, he has PI-RADS 4 classification on 2 small lesions in the peripheral zone at the apex measuring approximately 1 cm each.  There is no evidence of extraprostatic extension or pelvic lymphadenopathy.  MRI of the adrenal gland October 7, 2024 demonstrates findings consistent with a benign adenoma.  I reviewed these findings with the patient and his wife today.      HISTORY:  Past Medical History:    Allergic rhinitis    Cataract of both eyes    Cholelithiasis    Depression    Elevated PSA    Hemorrhoids    High triglycerides    IBS (irritable bowel syndrome)    No diabetic retinopathy OU    Osteoarthrosis, unspecified whether generalized or localized, unspecified site    Spinal stenosis    Torn tendon    Right  shoulder    Type II or unspecified type diabetes mellitus without mention of complication, not stated as uncontrolled    Unspecified essential hypertension    Vitreous floaters of both eyes      Past Surgical History:   Procedure Laterality Date    Angioplasty (coronary)  01/01/2011    + stent(s)    Cataract extraction w/  intraocular lens implant Right 03/16/2023    Dr. Gilmore done @ [facility]      Family History   Problem Relation Age of Onset    Heart Disease Father     Stroke Mother     Pulmonary Disease Brother     Diabetes Brother     Heart Attack Daughter 55    Glaucoma Neg     Macular degeneration Neg       Social History:   Social History     Socioeconomic History    Marital status:    Tobacco Use    Smoking status: Never    Smokeless tobacco: Never   Vaping Use    Vaping status: Never Used   Substance and Sexual Activity    Alcohol use: Yes     Comment: rarely    Drug use: No   Other Topics Concern    Caffeine Concern Yes     Comment: Coffee 1 cup daily    Exercise Yes   Social History Narrative    The patient does not use an assistive device..      The patient does live in a home with stairs.        Medications (Active prior to today's visit):  Current Outpatient Medications   Medication Sig Dispense Refill    cyanocobalamin 1000 MCG Oral Tab Take 2 tablets (2,000 mcg total) by mouth daily. 180 tablet 1    Ferrous Sulfate 325 (65 Fe) MG Oral Tab Take 1 tablet (325 mg total) by mouth every other day. 45 tablet 1    amLODIPine 10 MG Oral Tab Take 1 tablet (10 mg total) by mouth daily. 90 tablet 3    memantine 5 MG Oral Tab Take 1 tablet (5 mg total) by mouth daily. 90 tablet 1    ipratropium 0.06 % Nasal Solution 2 sprays by Nasal route 4 (four) times daily. 60 mL 3    zolpidem 10 MG Oral Tab Take 1 tablet (10 mg total) by mouth nightly as needed for Sleep. 90 tablet 1    metFORMIN HCl 1000 MG Oral Tab Take 1 tablet (1,000 mg total) by mouth 2 (two) times daily with meals. 180 tablet 3    celecoxib 200 MG  Oral Cap Take 1 capsule (200 mg total) by mouth daily. 90 capsule 3    folic acid 1 MG Oral Tab Take 1 tablet (1 mg total) by mouth daily. 90 tablet 1    LOSARTAN 100 MG Oral Tab TAKE 1 TABLET DAILY 90 tablet 3    METOPROLOL SUCCINATE ER 25 MG Oral Tablet 24 Hr TAKE 1 TABLET DAILY 90 tablet 3    dutasteride 0.5 MG Oral Cap Take 1 capsule (0.5 mg total) by mouth daily. 90 capsule 3    TAMSULOSIN 0.4 MG Oral Cap TAKE 1 CAPSULE DAILY 90 capsule 3    atorvastatin 20 MG Oral Tab Take 1 tablet (20 mg total) by mouth daily.      donepezil (ARICEPT) 10 MG Oral Tab Take 1 tablet (10 mg total) by mouth nightly. 90 tablet 3    FAMOTIDINE 20 MG Oral Tab TAKE 1 TABLET BY MOUTH TWICE A  tablet 3    DULOXETINE 30 MG Oral Cap DR Particles TAKE 1 CAPSULE DAILY 90 capsule 3    cetirizine 10 MG Oral Tab Take 1 tablet (10 mg total) by mouth daily.      Cholecalciferol (VITAMIN D3) 25 MCG (1000 UT) Oral Cap Take 1 tablet by mouth daily. 2000 units daily      LORAZEPAM 0.5 MG Oral Tab TAKE 1 TABLET BY MOUTH TWICE A DAY AS NEEDED 30 tablet 5    Coenzyme Q10 (CO Q-10) 200 MG Oral Cap Take 1 tablet by mouth daily.      aspirin 81 MG Oral Tab EC Take 1 tablet (81 mg total) by mouth daily.         Allergies:  Allergies[1]      ROS:       PHYSICAL EXAM:        ASSESSMENT/PLAN:   Assessment   Encounter Diagnoses   Name Primary?    Elevated PSA Yes    Adrenal mass (HCC)     Nodular prostate        Reviewed findings at length with patient.  He and I agreed on the following plan:  - Elevated PSA, abnormal prostate exam, moderately suspicious prostate MRI.  I again discussed with patient options including proceeding with a prostate biopsy versus continued close clinical observation since previously reported systemic side effects have improved.  Pros and cons of both of those approaches were discussed, specifically as they relate to an 84-year-old male with no known family history of prostate cancer.  He would prefer observation at this time  and we agreed that he would return to clinic in 6 months for repeat PSA and digital prostate exam.  - Adrenal mass: Appears to be a benign adenoma on MRI.  Will observe at this time.    He will continue on tamsulosin and dutasteride at this time.    I spent a total of 25 minutes with patient more than half the time face-to-face discussion.         Orders This Visit:  No orders of the defined types were placed in this encounter.      Meds This Visit:  Requested Prescriptions      No prescriptions requested or ordered in this encounter       Imaging & Referrals:  None     10/22/2024  Aimee Barclay MD               [1]   Allergies  Allergen Reactions    Ace Inhibitors Coughing     Other reaction(s): Cough

## 2024-10-25 ENCOUNTER — TELEPHONE (OUTPATIENT)
Dept: INTERNAL MEDICINE CLINIC | Facility: CLINIC | Age: 84
End: 2024-10-25

## 2024-10-25 RX ORDER — TERBINAFINE HYDROCHLORIDE 250 MG/1
250 TABLET ORAL DAILY
Qty: 42 TABLET | Refills: 0 | Status: SHIPPED | OUTPATIENT
Start: 2024-10-25 | End: 2024-12-06

## 2024-10-25 NOTE — TELEPHONE ENCOUNTER
To Dr. LARSON     Please advise     Per your OV FROM 10/15   Onychomycosis  - If LFTs stable, we will proceed with terbinafine daily for 6 weeks.

## 2024-10-25 NOTE — TELEPHONE ENCOUNTER
Noted- spoke to patient wife Shanice ( ok per HIPAA) - relayed MD message. Medication ordered per MD written order. Verbalized understanding. Will call with update when done with course of medication.

## 2024-10-25 NOTE — TELEPHONE ENCOUNTER
Patient's wife, Shanice (Verified HIPAA) called to check on status Toe Nail Fungus medication Dr. Macias was going to send to [facility].   Advised Shanice that Dr. Macias did send 2 medications to [facility].   Shanice would like to speak to nurse to clarify what each medication is for.   Shanice's # [phone]

## 2024-10-25 NOTE — TELEPHONE ENCOUNTER
Terbanie 1 tablet daily for 6 weeks. Let's send to local pharmacy, please confirm. Order pended.    May need a 2nd 6 week course if no better. UPdate us when prescription completed

## 2024-11-06 ENCOUNTER — TELEPHONE (OUTPATIENT)
Dept: INTERNAL MEDICINE CLINIC | Facility: CLINIC | Age: 84
End: 2024-11-06

## 2024-11-06 RX ORDER — ATORVASTATIN CALCIUM 20 MG/1
20 TABLET, FILM COATED ORAL DAILY
Qty: 90 TABLET | Refills: 3 | Status: SHIPPED | OUTPATIENT
Start: 2024-11-06

## 2024-11-06 NOTE — TELEPHONE ENCOUNTER
Patient asks if Dr Macias would take over refills for Atorvastatin 20 MG  Medication was previously prescribed by Dr Rey who has retired    Pharmacy - [facility] [place]    Call patient to confirm

## 2024-11-15 NOTE — PROCEDURE: SUPERFICIAL RADIATION TREATMENT
PATIENT IS CALLING TO CHECK ON STATUS OF THIS STATES IT IS URGENT SO HE KNOWS IF HE NEEDS TO CANCEL HIS APPOINTMENT     TUESDAY HE HAS TO REMOVE STITCHES     THURSDAY HE HAS A ANNUAL VISIT WITH CARDIOLOGY     FRIDAY GETTING TOOTH EXTRACTION     NEEDS TO KNOW HOW LONG TO QUARANTINE    Total Dose (Optional-Please Include Units): 5481.0 cGY

## 2024-12-02 ENCOUNTER — LAB ENCOUNTER (OUTPATIENT)
Dept: LAB | Age: 84
End: 2024-12-02
Attending: INTERNAL MEDICINE
Payer: MEDICARE

## 2024-12-02 ENCOUNTER — TELEPHONE (OUTPATIENT)
Dept: INTERNAL MEDICINE CLINIC | Facility: CLINIC | Age: 84
End: 2024-12-02

## 2024-12-02 DIAGNOSIS — E11.9 TYPE 2 DIABETES MELLITUS WITHOUT COMPLICATION, WITHOUT LONG-TERM CURRENT USE OF INSULIN (HCC): ICD-10-CM

## 2024-12-02 DIAGNOSIS — D64.9 ANEMIA, UNSPECIFIED TYPE: ICD-10-CM

## 2024-12-02 DIAGNOSIS — N18.31 STAGE 3A CHRONIC KIDNEY DISEASE (HCC): ICD-10-CM

## 2024-12-02 DIAGNOSIS — E53.8 VITAMIN B12 DEFICIENCY: ICD-10-CM

## 2024-12-02 DIAGNOSIS — E78.1 HYPERTRIGLYCERIDEMIA: ICD-10-CM

## 2024-12-02 DIAGNOSIS — R53.83 FATIGUE, UNSPECIFIED TYPE: ICD-10-CM

## 2024-12-02 DIAGNOSIS — D64.9 ANEMIA, UNSPECIFIED TYPE: Primary | ICD-10-CM

## 2024-12-02 LAB
ALBUMIN SERPL-MCNC: 4.9 G/DL (ref 3.2–4.8)
ALBUMIN/GLOB SERPL: 2 {RATIO} (ref 1–2)
ALP LIVER SERPL-CCNC: 89 U/L
ALT SERPL-CCNC: 11 U/L
ANION GAP SERPL CALC-SCNC: 5 MMOL/L (ref 0–18)
AST SERPL-CCNC: 20 U/L (ref ?–34)
BASOPHILS # BLD AUTO: 0.02 X10(3) UL (ref 0–0.2)
BASOPHILS NFR BLD AUTO: 0.2 %
BILIRUB SERPL-MCNC: 0.6 MG/DL (ref 0.2–1.1)
BILIRUB UR QL: NEGATIVE
BUN BLD-MCNC: 14 MG/DL (ref 9–23)
BUN/CREAT SERPL: 12.4 (ref 10–20)
CALCIUM BLD-MCNC: 10.4 MG/DL (ref 8.7–10.4)
CHLORIDE SERPL-SCNC: 104 MMOL/L (ref 98–112)
CHOLEST SERPL-MCNC: 136 MG/DL (ref ?–200)
CLARITY UR: CLEAR
CO2 SERPL-SCNC: 32 MMOL/L (ref 21–32)
CREAT BLD-MCNC: 1.13 MG/DL
CREAT UR-SCNC: 77 MG/DL
DEPRECATED HBV CORE AB SER IA-ACNC: 14 NG/ML
DEPRECATED RDW RBC AUTO: 45 FL (ref 35.1–46.3)
EGFRCR SERPLBLD CKD-EPI 2021: 64 ML/MIN/1.73M2 (ref 60–?)
EOSINOPHIL # BLD AUTO: 0.15 X10(3) UL (ref 0–0.7)
EOSINOPHIL NFR BLD AUTO: 1.7 %
ERYTHROCYTE [DISTWIDTH] IN BLOOD BY AUTOMATED COUNT: 14 % (ref 11–15)
EST. AVERAGE GLUCOSE BLD GHB EST-MCNC: 128 MG/DL (ref 68–126)
FASTING PATIENT LIPID ANSWER: YES
FASTING STATUS PATIENT QL REPORTED: YES
GLOBULIN PLAS-MCNC: 2.5 G/DL (ref 2–3.5)
GLUCOSE BLD-MCNC: 103 MG/DL (ref 70–99)
GLUCOSE UR-MCNC: NORMAL MG/DL
HBA1C MFR BLD: 6.1 % (ref ?–5.7)
HCT VFR BLD AUTO: 45.2 %
HDLC SERPL-MCNC: 36 MG/DL (ref 40–59)
HGB BLD-MCNC: 14.9 G/DL
HGB UR QL STRIP.AUTO: NEGATIVE
IMM GRANULOCYTES # BLD AUTO: 0.02 X10(3) UL (ref 0–1)
IMM GRANULOCYTES NFR BLD: 0.2 %
IRON SATN MFR SERPL: 22 %
IRON SERPL-MCNC: 91 UG/DL
KETONES UR-MCNC: NEGATIVE MG/DL
LDLC SERPL CALC-MCNC: 79 MG/DL (ref ?–100)
LEUKOCYTE ESTERASE UR QL STRIP.AUTO: NEGATIVE
LYMPHOCYTES # BLD AUTO: 2.43 X10(3) UL (ref 1–4)
LYMPHOCYTES NFR BLD AUTO: 28.3 %
MCH RBC QN AUTO: 29 PG (ref 26–34)
MCHC RBC AUTO-ENTMCNC: 33 G/DL (ref 31–37)
MCV RBC AUTO: 88.1 FL
MICROALBUMIN UR-MCNC: 0.7 MG/DL
MICROALBUMIN/CREAT 24H UR-RTO: 9.1 UG/MG (ref ?–30)
MONOCYTES # BLD AUTO: 0.71 X10(3) UL (ref 0.1–1)
MONOCYTES NFR BLD AUTO: 8.3 %
NEUTROPHILS # BLD AUTO: 5.25 X10 (3) UL (ref 1.5–7.7)
NEUTROPHILS # BLD AUTO: 5.25 X10(3) UL (ref 1.5–7.7)
NEUTROPHILS NFR BLD AUTO: 61.3 %
NITRITE UR QL STRIP.AUTO: NEGATIVE
NONHDLC SERPL-MCNC: 100 MG/DL (ref ?–130)
OSMOLALITY SERPL CALC.SUM OF ELEC: 293 MOSM/KG (ref 275–295)
PH UR: 7 [PH] (ref 5–8)
PLATELET # BLD AUTO: 108 10(3)UL (ref 150–450)
POTASSIUM SERPL-SCNC: 4 MMOL/L (ref 3.5–5.1)
PROT SERPL-MCNC: 7.4 G/DL (ref 5.7–8.2)
PROT UR-MCNC: NEGATIVE MG/DL
RBC # BLD AUTO: 5.13 X10(6)UL
SODIUM SERPL-SCNC: 141 MMOL/L (ref 136–145)
SP GR UR STRIP: 1.01 (ref 1–1.03)
TIBC SERPL-MCNC: 414 UG/DL (ref 250–425)
TRANSFERRIN SERPL-MCNC: 278 MG/DL (ref 215–365)
TRIGL SERPL-MCNC: 112 MG/DL (ref 30–149)
TSI SER-ACNC: 2.82 UIU/ML (ref 0.55–4.78)
UROBILINOGEN UR STRIP-ACNC: NORMAL
VIT B12 SERPL-MCNC: >2000 PG/ML (ref 211–911)
VLDLC SERPL CALC-MCNC: 18 MG/DL (ref 0–30)
WBC # BLD AUTO: 8.6 X10(3) UL (ref 4–11)

## 2024-12-02 PROCEDURE — 80053 COMPREHEN METABOLIC PANEL: CPT

## 2024-12-02 PROCEDURE — 82570 ASSAY OF URINE CREATININE: CPT

## 2024-12-02 PROCEDURE — 80061 LIPID PANEL: CPT

## 2024-12-02 PROCEDURE — 83036 HEMOGLOBIN GLYCOSYLATED A1C: CPT

## 2024-12-02 PROCEDURE — 85025 COMPLETE CBC W/AUTO DIFF WBC: CPT

## 2024-12-02 PROCEDURE — 36415 COLL VENOUS BLD VENIPUNCTURE: CPT

## 2024-12-02 PROCEDURE — 81003 URINALYSIS AUTO W/O SCOPE: CPT

## 2024-12-02 PROCEDURE — 84443 ASSAY THYROID STIM HORMONE: CPT

## 2024-12-02 PROCEDURE — 82607 VITAMIN B-12: CPT

## 2024-12-02 PROCEDURE — 82043 UR ALBUMIN QUANTITATIVE: CPT

## 2024-12-02 PROCEDURE — 82728 ASSAY OF FERRITIN: CPT

## 2024-12-02 PROCEDURE — 83540 ASSAY OF IRON: CPT

## 2024-12-02 PROCEDURE — 84466 ASSAY OF TRANSFERRIN: CPT

## 2024-12-06 ENCOUNTER — OFFICE VISIT (OUTPATIENT)
Dept: INTERNAL MEDICINE CLINIC | Facility: CLINIC | Age: 84
End: 2024-12-06

## 2024-12-06 VITALS
SYSTOLIC BLOOD PRESSURE: 130 MMHG | DIASTOLIC BLOOD PRESSURE: 52 MMHG | OXYGEN SATURATION: 98 % | HEIGHT: 67 IN | TEMPERATURE: 98 F | RESPIRATION RATE: 16 BRPM | WEIGHT: 156 LBS | BODY MASS INDEX: 24.48 KG/M2 | HEART RATE: 67 BPM

## 2024-12-06 DIAGNOSIS — Z13.29 SCREENING FOR THYROID DISORDER: ICD-10-CM

## 2024-12-06 DIAGNOSIS — I10 ESSENTIAL HYPERTENSION: ICD-10-CM

## 2024-12-06 DIAGNOSIS — N18.31 STAGE 3A CHRONIC KIDNEY DISEASE (HCC): ICD-10-CM

## 2024-12-06 DIAGNOSIS — E55.9 VITAMIN D DEFICIENCY: ICD-10-CM

## 2024-12-06 DIAGNOSIS — E53.8 VITAMIN B12 DEFICIENCY: ICD-10-CM

## 2024-12-06 DIAGNOSIS — R25.1 TREMORS OF NERVOUS SYSTEM: ICD-10-CM

## 2024-12-06 DIAGNOSIS — K21.9 GASTROESOPHAGEAL REFLUX DISEASE, UNSPECIFIED WHETHER ESOPHAGITIS PRESENT: ICD-10-CM

## 2024-12-06 DIAGNOSIS — D69.6 THROMBOCYTOPENIA (HCC): ICD-10-CM

## 2024-12-06 DIAGNOSIS — E78.1 HYPERTRIGLYCERIDEMIA: ICD-10-CM

## 2024-12-06 DIAGNOSIS — E53.8 FOLATE DEFICIENCY: ICD-10-CM

## 2024-12-06 DIAGNOSIS — E78.00 HYPERCHOLESTEROLEMIA: ICD-10-CM

## 2024-12-06 DIAGNOSIS — D64.9 ANEMIA, UNSPECIFIED TYPE: ICD-10-CM

## 2024-12-06 DIAGNOSIS — R63.4 UNINTENTIONAL WEIGHT LOSS: Primary | ICD-10-CM

## 2024-12-06 DIAGNOSIS — N40.2 PROSTATE NODULE: ICD-10-CM

## 2024-12-06 DIAGNOSIS — G62.9 PERIPHERAL POLYNEUROPATHY: ICD-10-CM

## 2024-12-06 DIAGNOSIS — B35.1 ONYCHOMYCOSIS: ICD-10-CM

## 2024-12-06 DIAGNOSIS — E11.9 TYPE 2 DIABETES MELLITUS WITHOUT COMPLICATION, WITHOUT LONG-TERM CURRENT USE OF INSULIN (HCC): ICD-10-CM

## 2024-12-06 DIAGNOSIS — R97.20 ELEVATED PSA: ICD-10-CM

## 2024-12-06 DIAGNOSIS — I25.10 ASHD (ARTERIOSCLEROTIC HEART DISEASE): ICD-10-CM

## 2024-12-06 RX ORDER — TERBINAFINE HYDROCHLORIDE 250 MG/1
250 TABLET ORAL DAILY
Qty: 42 TABLET | Refills: 0 | Status: SHIPPED | OUTPATIENT
Start: 2024-12-06 | End: 2025-01-17

## 2024-12-06 NOTE — PATIENT INSTRUCTIONS
You are seen in clinic today for follow-up.  Today, we did review your last set of workup with Dr. Barclay  -Given risks for potential procedures, treatments, we are monitoring with urology on prostate levels and examinations  - Ideally would pursue colonoscopy, but as long as weight remains stable we can continue to observe for now    We can discontinue the folic acid supplements and vitamin B12 supplements as these are now repleted  Iron deficiency is still persistent but your anemia has improved.    - Therefore we will continue with ferrous sulfate once a day    We need adequate nutrition, calorie intake is most important.  No specific restrictions on the diet  - Ideally we have increase in protein, good fiber intake to modulate the bowel movements.    Keep up with good water intake for the kidneys    It may still be reasonable to proceed with noninvasive cardiac testing such as stress test, echocardiogram even if we do not plan on procedures in the future    Sugars remain well-controlled, he may be due for diabetic eye exam with ophthalmology    Lets proceed with terbinafine for another 42 days.  If still no improvement, we should see podiatry for foot examination and diabetic nail care    Return to clinic in 3 months for Medicare annual physical exam.  We did place fasting blood work to be completed prior to your next visit

## 2024-12-06 NOTE — PROGRESS NOTES
Chief Complaint:   Chief Complaint   Patient presents with    Follow - Up     8 weeks, continues to have persistent mid abdominal pain, no appetite         HPI:     Mr. LUBIN is a 84 year old male PMHX coronary artery disease, type 2 diabetes, hypertension, hyperlipidemia, peripheral polyneuropathy, IBS, GERD, lumbar spinal stenosis with chronic back pain, BPH, stage III CKD coming in for follow-up.    Feeling OK. Stomach pain, feels good. 3 o'clock in the afternoon. It starts to feel a little bad. Stomach feels narrowing. 2-3/10. He doesn't feel thirsty so only drinks periodically throughout the day.    Leta notices he eats chocolate chips, banana, coffee. 10:30-11 am, cereal, yogurt, protein drink. 12 o'clock a small snack. Dinner - tomato soup. Fish., sweet potato. Ice cream bar    Bowel movements - in the evening, sometimes 1-2 times. Almost always before he goes to bed. 5-8 minutes he can sit and reports a [place] scale 2 and 5 that seems to alternate.    Energy levels remain low however.    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 and will require a pediatric ET tube if procedure needed    Past Medical History:    Allergic rhinitis    Cataract of both eyes    Cholelithiasis    Depression    Elevated PSA    Hemorrhoids    High triglycerides    IBS (irritable bowel syndrome)    No diabetic retinopathy OU    Osteoarthrosis, unspecified whether generalized or localized, unspecified site    Spinal stenosis    Torn tendon    Right shoulder    Type II or unspecified type diabetes mellitus without mention of complication, not stated as uncontrolled    Unspecified essential hypertension    Vitreous floaters of both eyes     Past Surgical History:   Procedure Laterality Date    Angioplasty (coronary)  01/01/2011    + stent(s)    Cataract extraction w/  intraocular lens implant Right 03/16/2023    Dr. Gilmore done @ [facility]     Social History:  Social History     Socioeconomic History    Marital status:     Tobacco Use    Smoking status: Never    Smokeless tobacco: Never   Vaping Use    Vaping status: Never Used   Substance and Sexual Activity    Alcohol use: Yes     Comment: rarely    Drug use: No   Other Topics Concern    Caffeine Concern Yes     Comment: Coffee 1 cup daily    Exercise Yes   Social History Narrative    The patient does not use an assistive device..      The patient does live in a home with stairs.     Family History:  Family History   Problem Relation Age of Onset    Heart Disease Father     Stroke Mother     Pulmonary Disease Brother     Diabetes Brother     Heart Attack Daughter 55    Glaucoma Neg     Macular degeneration Neg      Allergies:  Allergies   Allergen Reactions    Ace Inhibitors Coughing     Other reaction(s): Cough     Current Meds:  Current Outpatient Medications   Medication Sig Dispense Refill    terbinafine 250 MG Oral Tab Take 1 tablet (250 mg total) by mouth daily. 42 tablet 0    atorvastatin 20 MG Oral Tab Take 1 tablet (20 mg total) by mouth daily. 90 tablet 3    cyanocobalamin 1000 MCG Oral Tab Take 2 tablets (2,000 mcg total) by mouth daily. 180 tablet 1    Ferrous Sulfate 325 (65 Fe) MG Oral Tab Take 1 tablet (325 mg total) by mouth every other day. 45 tablet 1    amLODIPine 10 MG Oral Tab Take 1 tablet (10 mg total) by mouth daily. 90 tablet 3    memantine 5 MG Oral Tab Take 1 tablet (5 mg total) by mouth daily. 90 tablet 1    ipratropium 0.06 % Nasal Solution 2 sprays by Nasal route 4 (four) times daily. 60 mL 3    zolpidem 10 MG Oral Tab Take 1 tablet (10 mg total) by mouth nightly as needed for Sleep. 90 tablet 1    metFORMIN HCl 1000 MG Oral Tab Take 1 tablet (1,000 mg total) by mouth 2 (two) times daily with meals. 180 tablet 3    celecoxib 200 MG Oral Cap Take 1 capsule (200 mg total) by mouth daily. 90 capsule 3    LOSARTAN 100 MG Oral Tab TAKE 1 TABLET DAILY 90 tablet 3    METOPROLOL SUCCINATE ER 25 MG Oral Tablet 24 Hr TAKE 1 TABLET DAILY 90 tablet 3     dutasteride 0.5 MG Oral Cap Take 1 capsule (0.5 mg total) by mouth daily. 90 capsule 3    TAMSULOSIN 0.4 MG Oral Cap TAKE 1 CAPSULE DAILY 90 capsule 3    donepezil (ARICEPT) 10 MG Oral Tab Take 1 tablet (10 mg total) by mouth nightly. 90 tablet 3    FAMOTIDINE 20 MG Oral Tab TAKE 1 TABLET BY MOUTH TWICE A  tablet 3    DULOXETINE 30 MG Oral Cap DR Particles TAKE 1 CAPSULE DAILY 90 capsule 3    cetirizine 10 MG Oral Tab Take 1 tablet (10 mg total) by mouth daily.      Cholecalciferol (VITAMIN D3) 25 MCG (1000 UT) Oral Cap Take 1 tablet by mouth daily. 2000 units daily      LORAZEPAM 0.5 MG Oral Tab TAKE 1 TABLET BY MOUTH TWICE A DAY AS NEEDED 30 tablet 5    Coenzyme Q10 (CO Q-10) 200 MG Oral Cap Take 1 tablet by mouth daily.      aspirin 81 MG Oral Tab EC Take 1 tablet (81 mg total) by mouth daily.      folic acid 1 MG Oral Tab Take 1 tablet (1 mg total) by mouth daily. (Patient not taking: Reported on 12/6/2024) 90 tablet 1      Counseling given: Not Answered       REVIEW OF SYSTEMS:   Positive Findings indicated in BOLD    Constitutional: Fever, Chills, Weight Gain, Weight Loss - stable, Night Sweats, Fatigue, Malaise  ENT/Mouth:  Hearing Changes, Ear Pain, Nasal Congestion, Sinus Pain, Hoarseness, Sore throat, Rhinorrhea, Swallowing Difficulty  Eyes: Eye Pain, Swelling, Redness, Foreign Body, Discharge, Vision Changes  Cardiovascular: Chest Pain, SOB, PND, Dyspnea on Exertion, Orthopnea, Claudication, Edema, Palpitations  Respiratory: Cough, Sputum, Wheezing, Shortness of breath  Gastrointestinal: Nausea, Vomiting, Diarrhea, Constipation, Pain, Heartburn, Dysphagia, Bloody stools, Tarry stools  Genitourinary: Dysmenorrhea, Dysuria, Urinary Frequency, Hematuria, Urinary Incontinence, Urgency,  Flank Pain  Musculoskeletal: Arthralgias, Myalgias, Joint Swelling, Joint Stiffness, Back Pain, Neck Pain  Integumentary: Skin Lesions, Pruritis, Hair Changes, Jaundice, Nail changes  Neuro: Weakness, Numbness,  Paresthesias, Loss of Consciousness, Syncope, Dizziness, Headache, Falls  Psych: Anxiety, Depression, Insomnia, Suicidal Ideation, Homicidal ideation, Memory Changes  Heme/Lymph: Bruising, Bleeding, Lymphadenopathy  Endocrine: Polyuria, Polydipsia, Temperature Intolerance    EXAM:   Vital Signs:  Blood pressure 130/52, pulse 67, temperature 98.3 °F (36.8 °C), temperature source Oral, resp. rate 16, height 5' 7\" (1.702 m), weight 156 lb (70.8 kg), SpO2 98%.     Constitutional: No acute distress. Alert and oriented x 3.  Eyes: EOMI, PERRLA, clear sclera b/l  HENT: NCAT, Moist mucous membranes, Oropharynx without erythema or exudates  Cardiovascular: S1, S2, no S3, no S4, Regular rate and rhythm, No murmurs/gallops/rubs.   Respiratory: Clear to auscultation bilaterally.  No wheezes/rales/rhonchi  Gastrointestinal: Soft, nontender, nondistended. Positive bowel sounds x 4. No rebound tenderness. No hepatomegaly, No splenomegaly  Genitourinary: No CVA tenderness bilaterally  Neurologic: No focal neurological deficits, CN II-XII intact, light touch intact, MSK Strength 5/5 and symmetric in all extremities, normal gait, 2+ patellar tendon  Musculoskeletal: Full range of motion of all extremities, no clubbing/swelling/edema  Skin: No lesions, No erythema, no jaundice, Cap Refill < 2s  Psychiatric: Appropriate mood and affect  Heme/Lymph/Immune: No cervical LAD      Diabetic foot examination  - No visible ulcers, wounds  - Nails dystrophic nails  - Vibration sense up to left shin detectable, right knee detectable  - Monofilament x  2 areas plantar surface only b/l      DATA REVIEWED   Labs:  Recent Results (from the past 8760 hours)   Comp Metabolic Panel (14)    Collection Time: 12/02/24  8:07 AM   Result Value Ref Range    Glucose 103 (H) 70 - 99 mg/dL    Sodium 141 136 - 145 mmol/L    Potassium 4.0 3.5 - 5.1 mmol/L    Chloride 104 98 - 112 mmol/L    CO2 32.0 21.0 - 32.0 mmol/L    Anion Gap 5 0 - 18 mmol/L    BUN 14 9 - 23  mg/dL    Creatinine 1.13 0.70 - 1.30 mg/dL    BUN/CREA Ratio 12.4 10.0 - 20.0    Calcium, Total 10.4 8.7 - 10.4 mg/dL    Calculated Osmolality 293 275 - 295 mOsm/kg    eGFR-Cr 64 >=60 mL/min/1.73m2    ALT 11 10 - 49 U/L    AST 20 <34 U/L    Alkaline Phosphatase 89 45 - 117 U/L    Bilirubin, Total 0.6 0.2 - 1.1 mg/dL    Total Protein 7.4 5.7 - 8.2 g/dL    Albumin 4.9 (H) 3.2 - 4.8 g/dL    Globulin  2.5 2.0 - 3.5 g/dL    A/G Ratio 2.0 1.0 - 2.0    Patient Fasting for CMP? Yes      *Note: Due to a large number of results and/or encounters for the requested time period, some results have not been displayed. A complete set of results can be found in Results Review.       Recent Results (from the past 8760 hours)   CBC With Differential With Platelet    Collection Time: 12/02/24  8:07 AM   Result Value Ref Range    WBC 8.6 4.0 - 11.0 x10(3) uL    RBC 5.13 3.80 - 5.80 x10(6)uL    HGB 14.9 13.0 - 17.5 g/dL    HCT 45.2 39.0 - 53.0 %    MCV 88.1 80.0 - 100.0 fL    MCH 29.0 26.0 - 34.0 pg    MCHC 33.0 31.0 - 37.0 g/dL    RDW-SD 45.0 35.1 - 46.3 fL    RDW 14.0 11.0 - 15.0 %    .0 (L) 150.0 - 450.0 10(3)uL    Neutrophil Absolute Prelim 5.25 1.50 - 7.70 x10 (3) uL    Neutrophil Absolute 5.25 1.50 - 7.70 x10(3) uL    Lymphocyte Absolute 2.43 1.00 - 4.00 x10(3) uL    Monocyte Absolute 0.71 0.10 - 1.00 x10(3) uL    Eosinophil Absolute 0.15 0.00 - 0.70 x10(3) uL    Basophil Absolute 0.02 0.00 - 0.20 x10(3) uL    Immature Granulocyte Absolute 0.02 0.00 - 1.00 x10(3) uL    Neutrophil % 61.3 %    Lymphocyte % 28.3 %    Monocyte % 8.3 %    Eosinophil % 1.7 %    Basophil % 0.2 %    Immature Granulocyte % 0.2 %     *Note: Due to a large number of results and/or encounters for the requested time period, some results have not been displayed. A complete set of results can be found in Results Review.     MRI abdomen and pelvis 10/8/2024    Impression   CONCLUSION:  1. Stable small right adrenal nodule, which is characterized as a benign  adenoma.  2. Cholelithiasis.  3. Stable simple hepatic and renal cysts.  4. Lesser incidental findings as above.          MRI prostate 10/9/2024  Impression   CONCLUSION:  1. Limited MRI of the prostate gland due to technical difficulties with the IV contrast injection.    2. Two small lesions in the peripheral zone at the prostate apex, both measuring approximately 1.0 cm in size.  These include a lesion at 5:30 and a lesion at 7 o'clock.  PI-RADS CLASSIFICATION:  PI-RADS 4 - High (clinically significant cancer is likely  to be present).  Note that PI-RADS classification of these lesions would not change with IV contrast.    3. Additional approximately 1.0 cm sized lesion in the peripheral zone at 7 o'clock at the base of the prostate gland that is more indeterminate.  PI-RADS CLASSIFICATION:  PI-RADS 3 - Intermediate (the presence of clinically significant cancer is  equivocal).  Note this lesion could potentially be upgraded to a PI-RADS 4 lesion if IV contrast demonstrated suspicious enhancement characteristics.  4. No evidence of extracapsular extension of tumor, pelvic lymphadenopathy or osseous metastases.      5. Prostate gland is moderately enlarged and demonstrates benign prostatic hypertrophy.     X-ray right shoulder 10/17/2024    Impression   CONCLUSION:  1. Primary osteoarthritic changes of the right shoulder are apparent.     2. No radiographically visible acute osseous injury of the right shoulder.     3. Radiographic findings which could be indicative of underlying calcific tendinitis of the right supraspinatus tendon.     4. Lateral acromial downsloping could predispose to impingement syndrome in the appropriate clinical setting.       ASSESSMENT AND PLAN:     Weight loss with anorexia  Patient has been losing weight over time.  Patient is lost 6 pounds since he was here in March.  In the past patient weighed around 200 to 205 pounds.  Patient is now down to 157 pounds.  - On Dr. Mendiola's  initial evaluation, he is concerned about occult malignancy given significant weight loss unintentionally, significant anemia, complaints of abdominal pain, elevated PSA  - Noted multiple nutritional deficiencies and iron deficiency, folic acid deficiency, borderline vitamin B12 deficiency  - MRI of the prostate did show lesions PI-RADS 4 in the peripheral zone of the prostate apex, PI-RADS 3 the limited without use of IV contrast.  Clinically significant cancer likely to be present.  He did have follow-up with urology 10/22 for which Kerri electing for observation and surveillance PSA every 6 months.  -Recall that colonoscopy was being arranged as part of our workup.  However, he would like to hold off on this    ASHD (arteriosclerotic heart disease)  History of PCI of LAD and RCA more than 10 years ago  - Undergoing Lexiscan stress test with Dr. Wade  -Planning for repeat echocardiogram on an annual basis  - On aspirin 81 mg daily, control of cholesterol as below  -Discussed with patient and his wife Shanice, we should proceed with noninvasive cardiac workup that was recommended back in March by Dr. Wade.  We need a stress test, echocardiogram.  Aware that if there is any abnormalities given his heart history, cardiac catheterization may be indicated.  May need to follow-up with cardiology regarding this.  - Would advise cardiac clearance prior to any potential invasive procedures in the near future     Type 2 diabetes mellitus without complication, without long-term current use of insulin (HCC)  Recent A1c:   HgbA1C (%)   Date Value   12/02/2024 6.1 (H)     Recent urine microalbumin: No components found for: \"URINEMICROALBUMIN\"  Current medications: 1000 mg metformin twice a day  Eye exam: Due for diabetic eye exam  Foot exam: Check feet daily  - A1c remains controlled  - There seems to be a degree of neuropathy assessed on foot exam above    Hypertension  -Blood pressure today at goal  - Check blood  pressures at home  - Continue with home amlodipine 10 mg daily, losartan 100 mg daily, metoprolol succinate 25 mg daily     Hyperlipidemia  -Last lipid panel remains at goal  - Repeat fasting lipid panel at next visit  - Continue with atorvastatin 20 mg daily      Mitral valve insufficiency, unspecified etiology  Aortic valve stenosis, etiology of cardiac valve disease unspecified  -Being monitored by cardiology, Dr. Wade, last TTE with moderate mitral regurgitation but normal left ventricular ejection fraction and chamber size.  Plan to repeat TTE ED annually     Peripheral polyneuropathy  -Consider effects of diabetic polyneuropathy.  Should also consider vitamin B12 relative deficiency as we are on the lower end of normal  - We will continue glycemic control, vitamin B12 supplementation as below     Gastroesophageal reflux disease, unspecified whether esophagitis present  Irritable bowel syndrome without diarrhea  -On famotidine 20 mg twice a day  - H. pylori breath test was negative  - MRI abdomen otherwise without significant abnormalities    Stage 3a chronic kidney disease (HCC)  Patient's recent creatinine 1.13, EGFR 64  -Will monitor closely  - Continue with adequate water intake     Anemia, with iron deficiency  Thrombocytopenia  -Hemoglobin 14.9, folate repleted, vitamin B12 repleted.  - Folic acid levels at goal.  Okay to discontinue  - Vitamin B12 now repleted.  Would recommend discontinuing and following closely.  - Ferritin levels remain low, but his anemia has improved.  Would continue ferrous sulfate 325 mg daily.  Monitor for constipation  -We will repeat CBC, iron studies, vitamin levels to monitor supplementation     Prostate nodule  BPH with LUTS  Patient has a prostate nodule involving the left lobe of his prostate which is suspicious for prostate cancer.  Patient will have a diagnostic PSA.  -PSA elevated 7.50  - For concern of prostate cancer, does require imaging ideally with CT scan  -  Established with urology, Dr. Barclay most recently seen 10/22.  Patient's preference is observation and plans for repeat PSA and prostate exam in 6 months.  Continued on tamsulosin and dutasteride    Spinal stenosis of lumbar region, unspecified whether neurogenic claudication   Chronic bilateral low back pain without sciatica  Patient complains of much pain in his low back region.  He also has some pain in the upper back region.  -Acetaminophen 500-650 mg every 4-6 hours as needed for pain relief  - Should avoid NSAIDs given history of chronic kidney disease  - On duloxetine 30 mg daily  - Notify us if pain symptoms worsen over time    Memory changes  - May be age-related, however there are likely some contribution with vitamin B12 deficiency  - We will proceed with vitamin B12 supplementation, 1000 mcg once a day  - Vitamin B12 has now been repleted    Onychomycosis  - If LFTs stable, we will proceed with terbinafine daily for 6 weeks.       Orders This Visit:  Orders Placed This Encounter   Procedures    CBC With Differential With Platelet    Comp Metabolic Panel (14)    Hemoglobin A1C    Lipid Panel    TSH W Reflex To Free T4    Vitamin D    Vitamin B12    Folic Acid Serum (Folate)    Ferritin    Iron And Tibc    Lipase    C-Reactive Protein       Meds This Visit:  Requested Prescriptions     Signed Prescriptions Disp Refills    terbinafine 250 MG Oral Tab 42 tablet 0     Sig: Take 1 tablet (250 mg total) by mouth daily.       Imaging & Referrals:  PODIATRY - INTERNAL  NEURO - INTERNAL     Health Maintenance  Last Medicare physical 6/19/2024  - Due for dilated eye exam for diabetes      Spent 45 minutes obtaining history, evaluating patient, discussing treatment options, diet, exercise, review of available labs and radiology reports, and completing documentation.       Return to clinic in 3 months for Medicare annual physical exam    Jose Angel Macias MD, 12/07/24, 7:00 AM

## 2024-12-12 ENCOUNTER — TELEPHONE (OUTPATIENT)
Dept: INTERNAL MEDICINE CLINIC | Facility: CLINIC | Age: 84
End: 2024-12-12

## 2024-12-12 RX ORDER — DULOXETIN HYDROCHLORIDE 30 MG/1
30 CAPSULE, DELAYED RELEASE ORAL DAILY
Qty: 90 CAPSULE | Refills: 3 | Status: SHIPPED | OUTPATIENT
Start: 2024-12-12

## 2024-12-30 RX ORDER — DUTASTERIDE 0.5 MG/1
0.5 CAPSULE, LIQUID FILLED ORAL DAILY
Qty: 90 CAPSULE | Refills: 3 | Status: CANCELLED | OUTPATIENT
Start: 2024-12-30

## 2025-01-02 ENCOUNTER — TELEPHONE (OUTPATIENT)
Dept: INTERNAL MEDICINE CLINIC | Facility: CLINIC | Age: 85
End: 2025-01-02

## 2025-01-02 DIAGNOSIS — R63.0 LOSS OF APPETITE: Primary | ICD-10-CM

## 2025-01-02 NOTE — TELEPHONE ENCOUNTER
To Dr LARSON,    S/w patient's spouse and relayed MD message.HIPAA verified  She will call to schedule appointment with Dietitian      Spouse stated she will try but does not believe patient will follow up with Cardiology as you have advised

## 2025-01-02 NOTE — TELEPHONE ENCOUNTER
Patient's wife, Leta, asks if Dr Macias can recommend a nutritionist for him     Call back # [phone]

## 2025-01-02 NOTE — TELEPHONE ENCOUNTER
Referral for dietitian placed:  To schedule an appointment with an outpatient dietitian, please call [phone].     Again please encourage patient to follow-up with cardiologist for routine cardiac checkup.  If the weight loss persists, we need to reevaluate the need for further testing.

## 2025-01-03 ENCOUNTER — ORDER TRANSCRIPTION (OUTPATIENT)
Dept: ADMINISTRATIVE | Facility: HOSPITAL | Age: 85
End: 2025-01-03

## 2025-01-03 ENCOUNTER — TELEPHONE (OUTPATIENT)
Dept: ENDOCRINOLOGY | Facility: HOSPITAL | Age: 85
End: 2025-01-03

## 2025-01-03 DIAGNOSIS — E11.9 DIABETES MELLITUS TYPE 2 WITHOUT RETINOPATHY (HCC): Primary | ICD-10-CM

## 2025-01-03 DIAGNOSIS — R63.0 LOSS OF APPETITE: Primary | ICD-10-CM

## 2025-01-03 DIAGNOSIS — E46 PROTEIN-CALORIE MALNUTRITION, UNSPECIFIED SEVERITY (HCC): ICD-10-CM

## 2025-01-03 NOTE — TELEPHONE ENCOUNTER
Jas Macias,    I was notified of the referral you entered for this patient to see a nutritionist. Due to his diagnosis of Diabetes, [facility] have [facility]. Outpatient dieticians aren't able to see patients with that diagnosis. I was able to get the patient scheduled with us for this upcoming Tuesday, the 7th. We do need a new order for the patient to be seen in our department though.    If you can please place a new referral for \"Diabetic Education - [facility]\", input the Diabetes Diagnosis code, and the type of education will be \"Initial Medical Nutrition Therapy\" (or MNT for short) You can also add a comment or note stating that you would like an evaluation done for loss of appetite and improvement of caloric intake in regards to his diabetes. I will also let our educator I have him scheduled with a heads up so make sure we provide him the best education we can.         If you can send the requested order as soon as possible I would appreciate it! I do apologize I am not able to pend the order to you as I am no clinical and don't have the ability to.     If there are any questions please call the office [phone] or send me a message.    Thanks!

## 2025-01-03 NOTE — TELEPHONE ENCOUNTER
Jas Blake, thank you for letting me know.  I placed a new appropriate referral as requested.  Please have me know if you need any further assistance in the meantime.

## 2025-01-07 ENCOUNTER — HOSPITAL ENCOUNTER (OUTPATIENT)
Dept: ENDOCRINOLOGY | Facility: HOSPITAL | Age: 85
Discharge: HOME | End: 2025-01-07
Attending: INTERNAL MEDICINE
Payer: MEDICARE

## 2025-01-07 ENCOUNTER — TELEPHONE (OUTPATIENT)
Dept: ENDOCRINOLOGY | Facility: HOSPITAL | Age: 85
End: 2025-01-07

## 2025-01-07 DIAGNOSIS — E11.9 DIABETES MELLITUS TYPE 2 WITHOUT RETINOPATHY (HCC): Primary | ICD-10-CM

## 2025-01-07 DIAGNOSIS — E46 PROTEIN-CALORIE MALNUTRITION, UNSPECIFIED SEVERITY (HCC): ICD-10-CM

## 2025-01-07 NOTE — TELEPHONE ENCOUNTER
Tanmay Macias,    Your patient was mistakenly put into my schedule today; I am the RD in the [facility] and the mistake was made by central scheduling.  I am sorry this happened and I am not going to charge the pt but I did speak with him and his wife a bit since they made the trip over here.  I know that your chart note stated Kashif's limited daily food intake and his wife is really trying to be creative with the meals she is preparing.  After speaking with them, it seems to me that is a multi-faceted problem likely caused by anorexia of aging due to reduced sense of taste and smell, self-reported swallowing difficulty, and possibly the effects of polypharmacy.  I am wondering if a speech therapy consult may be helpful.  Also, if the tips discussed at our meeting today do not help, pt can meet with Danika Randall, the outpt RD at [facility].  Her number is [phone].    Sincerely,  Court Amaral RD, [facility]

## 2025-01-08 ENCOUNTER — TELEPHONE (OUTPATIENT)
Dept: INTERNAL MEDICINE CLINIC | Facility: CLINIC | Age: 85
End: 2025-01-08

## 2025-01-09 RX ORDER — DUTASTERIDE 0.5 MG/1
0.5 CAPSULE, LIQUID FILLED ORAL DAILY
Qty: 90 CAPSULE | Refills: 3 | Status: SHIPPED | OUTPATIENT
Start: 2025-01-09

## 2025-01-24 NOTE — TELEPHONE ENCOUNTER
LOV 12/6/2024:    Onychomycosis  - If LFTs stable, we will proceed with terbinafine daily for 6 weeks.    Mychart to meaghna, to Wendi per protocol

## 2025-01-30 ENCOUNTER — TELEPHONE (OUTPATIENT)
Dept: INTERNAL MEDICINE CLINIC | Facility: CLINIC | Age: 85
End: 2025-01-30

## 2025-01-30 RX ORDER — MEMANTINE HYDROCHLORIDE 5 MG/1
5 TABLET ORAL DAILY
Qty: 90 TABLET | Refills: 1 | Status: SHIPPED | OUTPATIENT
Start: 2025-01-30

## 2025-01-30 NOTE — TELEPHONE ENCOUNTER
Memantine prescribed on 8/16/2024 by  on a trial basis (see 7/19/2024 telephone encounter).     ok to keep refilling it?

## 2025-01-31 RX ORDER — TERBINAFINE HYDROCHLORIDE 250 MG/1
250 TABLET ORAL DAILY
Qty: 42 TABLET | Refills: 0 | OUTPATIENT
Start: 2025-01-31

## 2025-02-03 ENCOUNTER — TELEPHONE (OUTPATIENT)
Dept: INTERNAL MEDICINE CLINIC | Facility: CLINIC | Age: 85
End: 2025-02-03

## 2025-02-03 DIAGNOSIS — G47.00 INSOMNIA, UNSPECIFIED TYPE: ICD-10-CM

## 2025-02-03 RX ORDER — ZOLPIDEM TARTRATE 10 MG/1
10 TABLET ORAL NIGHTLY PRN
Qty: 90 TABLET | Refills: 1 | Status: SHIPPED | OUTPATIENT
Start: 2025-02-03

## 2025-02-03 NOTE — TELEPHONE ENCOUNTER
To MD:  The above refill request is for a controlled substance.  Please review pended medication order.   Print and sign for staff to fax to pharmacy or prescribe electronically.    Last office visit: 12/6/2024  Last time refill sent and quantity/refills: 8/9/2024 #90 with 1 refill ()      Statement Selected

## 2025-02-11 NOTE — PATIENT INSTRUCTIONS
Patient is to continue his current diet, medication and activity. Patient will check with his pharmacy to see if he is received his last COVID booster vaccine and flu vaccine. I will see the patient back in 3 months with blood tests which will include a CBC, BMP, hemoglobin A1c, lipid panel, AST, ALT. I will see the patient back sooner as necessary. Patient will follow-up with cardiology as he is scheduled to do. #1:

## 2025-02-24 ENCOUNTER — TELEPHONE (OUTPATIENT)
Dept: INTERNAL MEDICINE CLINIC | Facility: CLINIC | Age: 85
End: 2025-02-24

## 2025-02-24 RX ORDER — FAMOTIDINE 20 MG/1
20 TABLET, FILM COATED ORAL 2 TIMES DAILY
Qty: 180 TABLET | Refills: 3 | Status: SHIPPED | OUTPATIENT
Start: 2025-02-24

## 2025-02-27 ENCOUNTER — HOSPITAL ENCOUNTER (EMERGENCY)
Facility: HOSPITAL | Age: 85
Discharge: HOME | End: 2025-02-28
Attending: EMERGENCY MEDICINE
Payer: MEDICARE

## 2025-02-27 ENCOUNTER — LAB ENCOUNTER (OUTPATIENT)
Dept: LAB | Age: 85
End: 2025-02-27
Attending: INTERNAL MEDICINE
Payer: MEDICARE

## 2025-02-27 DIAGNOSIS — E55.9 VITAMIN D DEFICIENCY: ICD-10-CM

## 2025-02-27 DIAGNOSIS — E53.8 VITAMIN B12 DEFICIENCY: ICD-10-CM

## 2025-02-27 DIAGNOSIS — R63.4 UNINTENTIONAL WEIGHT LOSS: ICD-10-CM

## 2025-02-27 DIAGNOSIS — E11.649 HYPOGLYCEMIC EPISODE IN PATIENT WITH DIABETES MELLITUS (HCC): Primary | ICD-10-CM

## 2025-02-27 DIAGNOSIS — E78.1 HYPERTRIGLYCERIDEMIA: ICD-10-CM

## 2025-02-27 DIAGNOSIS — D64.9 ANEMIA, UNSPECIFIED TYPE: ICD-10-CM

## 2025-02-27 DIAGNOSIS — N18.31 STAGE 3A CHRONIC KIDNEY DISEASE (HCC): ICD-10-CM

## 2025-02-27 DIAGNOSIS — E11.9 TYPE 2 DIABETES MELLITUS WITHOUT COMPLICATION, WITHOUT LONG-TERM CURRENT USE OF INSULIN (HCC): ICD-10-CM

## 2025-02-27 DIAGNOSIS — D69.6 THROMBOCYTOPENIA: ICD-10-CM

## 2025-02-27 DIAGNOSIS — E53.8 FOLATE DEFICIENCY: ICD-10-CM

## 2025-02-27 LAB
ALBUMIN SERPL-MCNC: 4.3 G/DL (ref 3.2–4.8)
ALBUMIN/GLOB SERPL: 2 {RATIO} (ref 1–2)
ALP LIVER SERPL-CCNC: 71 U/L
ALT SERPL-CCNC: 11 U/L
ANION GAP SERPL CALC-SCNC: 10 MMOL/L (ref 0–18)
AST SERPL-CCNC: 19 U/L (ref ?–34)
BASOPHILS # BLD AUTO: 0.01 X10(3) UL (ref 0–0.2)
BASOPHILS NFR BLD AUTO: 0.1 %
BILIRUB SERPL-MCNC: 0.7 MG/DL (ref 0.2–1.1)
BUN BLD-MCNC: 18 MG/DL (ref 9–23)
BUN/CREAT SERPL: 16.4 (ref 10–20)
CALCIUM BLD-MCNC: 9.4 MG/DL (ref 8.7–10.4)
CHLORIDE SERPL-SCNC: 104 MMOL/L (ref 98–112)
CHOLEST SERPL-MCNC: 95 MG/DL (ref ?–200)
CO2 SERPL-SCNC: 28 MMOL/L (ref 21–32)
CREAT BLD-MCNC: 1.1 MG/DL
CRP SERPL-MCNC: <0.4 MG/DL (ref ?–1)
DEPRECATED HBV CORE AB SER IA-ACNC: 20 NG/ML
DEPRECATED RDW RBC AUTO: 44.5 FL (ref 35.1–46.3)
EGFRCR SERPLBLD CKD-EPI 2021: 66 ML/MIN/1.73M2 (ref 60–?)
EOSINOPHIL # BLD AUTO: 0.15 X10(3) UL (ref 0–0.7)
EOSINOPHIL NFR BLD AUTO: 1.9 %
ERYTHROCYTE [DISTWIDTH] IN BLOOD BY AUTOMATED COUNT: 13.6 % (ref 11–15)
EST. AVERAGE GLUCOSE BLD GHB EST-MCNC: 108 MG/DL (ref 68–126)
FASTING PATIENT LIPID ANSWER: YES
FASTING STATUS PATIENT QL REPORTED: YES
FOLATE SERPL-MCNC: 11.5 NG/ML (ref 5.4–?)
GLOBULIN PLAS-MCNC: 2.1 G/DL (ref 2–3.5)
GLUCOSE BLD-MCNC: 59 MG/DL (ref 70–99)
GLUCOSE BLDC GLUCOMTR-MCNC: 41 MG/DL (ref 70–99)
HBA1C MFR BLD: 5.4 % (ref ?–5.7)
HCT VFR BLD AUTO: 40.5 %
HDLC SERPL-MCNC: 26 MG/DL (ref 40–59)
HGB BLD-MCNC: 13.9 G/DL
IMM GRANULOCYTES # BLD AUTO: 0.02 X10(3) UL (ref 0–1)
IMM GRANULOCYTES NFR BLD: 0.2 %
IRON SATN MFR SERPL: 28 %
IRON SERPL-MCNC: 84 UG/DL
LDLC SERPL CALC-MCNC: 52 MG/DL (ref ?–100)
LIPASE SERPL-CCNC: 177 U/L (ref 12–53)
LYMPHOCYTES # BLD AUTO: 2.31 X10(3) UL (ref 1–4)
LYMPHOCYTES NFR BLD AUTO: 28.6 %
MCH RBC QN AUTO: 30.4 PG (ref 26–34)
MCHC RBC AUTO-ENTMCNC: 34.3 G/DL (ref 31–37)
MCV RBC AUTO: 88.6 FL
MONOCYTES # BLD AUTO: 0.61 X10(3) UL (ref 0.1–1)
MONOCYTES NFR BLD AUTO: 7.5 %
NEUTROPHILS # BLD AUTO: 4.99 X10 (3) UL (ref 1.5–7.7)
NEUTROPHILS # BLD AUTO: 4.99 X10(3) UL (ref 1.5–7.7)
NEUTROPHILS NFR BLD AUTO: 61.7 %
NONHDLC SERPL-MCNC: 69 MG/DL (ref ?–130)
OSMOLALITY SERPL CALC.SUM OF ELEC: 294 MOSM/KG (ref 275–295)
PLATELET # BLD AUTO: 122 10(3)UL (ref 150–450)
POTASSIUM SERPL-SCNC: 4 MMOL/L (ref 3.5–5.1)
PROT SERPL-MCNC: 6.4 G/DL (ref 5.7–8.2)
RBC # BLD AUTO: 4.57 X10(6)UL
SODIUM SERPL-SCNC: 142 MMOL/L (ref 136–145)
TOTAL IRON BINDING CAPACITY: 295 UG/DL (ref 250–425)
TRANSFERRIN SERPL-MCNC: 231 MG/DL (ref 215–365)
TRIGL SERPL-MCNC: 84 MG/DL (ref 30–149)
TSI SER-ACNC: 1.79 UIU/ML (ref 0.55–4.78)
VIT B12 SERPL-MCNC: 554 PG/ML (ref 211–911)
VIT D+METAB SERPL-MCNC: 115.3 NG/ML (ref 30–100)
VLDLC SERPL CALC-MCNC: 12 MG/DL (ref 0–30)
WBC # BLD AUTO: 8.1 X10(3) UL (ref 4–11)

## 2025-02-27 PROCEDURE — 99284 EMERGENCY DEPT VISIT MOD MDM: CPT

## 2025-02-27 PROCEDURE — 86140 C-REACTIVE PROTEIN: CPT

## 2025-02-27 PROCEDURE — 82746 ASSAY OF FOLIC ACID SERUM: CPT

## 2025-02-27 PROCEDURE — 82728 ASSAY OF FERRITIN: CPT

## 2025-02-27 PROCEDURE — 84443 ASSAY THYROID STIM HORMONE: CPT

## 2025-02-27 PROCEDURE — 82607 VITAMIN B-12: CPT

## 2025-02-27 PROCEDURE — 80053 COMPREHEN METABOLIC PANEL: CPT

## 2025-02-27 PROCEDURE — 82306 VITAMIN D 25 HYDROXY: CPT

## 2025-02-27 PROCEDURE — 96374 THER/PROPH/DIAG INJ IV PUSH: CPT

## 2025-02-27 PROCEDURE — 83540 ASSAY OF IRON: CPT

## 2025-02-27 PROCEDURE — 84466 ASSAY OF TRANSFERRIN: CPT

## 2025-02-27 PROCEDURE — 83036 HEMOGLOBIN GLYCOSYLATED A1C: CPT

## 2025-02-27 PROCEDURE — 82962 GLUCOSE BLOOD TEST: CPT

## 2025-02-27 PROCEDURE — 85025 COMPLETE CBC W/AUTO DIFF WBC: CPT

## 2025-02-27 PROCEDURE — 80061 LIPID PANEL: CPT

## 2025-02-27 PROCEDURE — 83690 ASSAY OF LIPASE: CPT

## 2025-02-27 PROCEDURE — 36415 COLL VENOUS BLD VENIPUNCTURE: CPT

## 2025-02-27 RX ORDER — DEXTROSE MONOHYDRATE 25 G/50ML
50 INJECTION, SOLUTION INTRAVENOUS ONCE
Status: COMPLETED | OUTPATIENT
Start: 2025-02-27 | End: 2025-02-27

## 2025-02-28 ENCOUNTER — TELEPHONE (OUTPATIENT)
Dept: INTERNAL MEDICINE CLINIC | Facility: CLINIC | Age: 85
End: 2025-02-28

## 2025-02-28 VITALS
BODY MASS INDEX: 24 KG/M2 | SYSTOLIC BLOOD PRESSURE: 134 MMHG | WEIGHT: 156.06 LBS | OXYGEN SATURATION: 94 % | DIASTOLIC BLOOD PRESSURE: 62 MMHG | RESPIRATION RATE: 18 BRPM | TEMPERATURE: 97 F | HEART RATE: 59 BPM

## 2025-02-28 LAB
GLUCOSE BLDC GLUCOMTR-MCNC: 139 MG/DL (ref 70–99)
GLUCOSE BLDC GLUCOMTR-MCNC: 150 MG/DL (ref 70–99)

## 2025-02-28 PROCEDURE — 82962 GLUCOSE BLOOD TEST: CPT

## 2025-02-28 RX ORDER — TAMSULOSIN HYDROCHLORIDE 0.4 MG/1
0.4 CAPSULE ORAL DAILY
Qty: 90 CAPSULE | Refills: 3 | Status: SHIPPED | OUTPATIENT
Start: 2025-02-28

## 2025-02-28 NOTE — ED QUICK NOTES
Family arrived and at bedside, per pt's family called EMS after they noticed pt was exhibiting \"strange behavior\" per pt's wife pt is normally a&ox3, and noticed pt \"not acting himself\" pt wife endorses she feels pt's metformin dosage needs to be cut back d/t pt hasn't been eating as much recently and has an appointment with his  to discuss the above mentioned concerns.   Pt wife also endorses pt slid himself onto the floor and sustained a skin tear to his rt elbow from the carpet, denies pt hitting his head.

## 2025-02-28 NOTE — TELEPHONE ENCOUNTER
To  - was seen in ER for hypoglycemia and fall  Clinical Impression:  1. Hypoglycemic episode in patient with diabetes mellitus (HCC)

## 2025-02-28 NOTE — ED PROVIDER NOTES
Patient Seen in: [facility] Emergency Department    History     Chief Complaint   Patient presents with    Hypoglycemia    Fall       HPI    The patient presents to the ED via EMS after he became altered at home and glucose was noted to be low.  EMS states glucose was below 40.  He was given oral glucose by EMS and brought to the ED.  Confused on arrival.  History of diabetes and on oral diabetic medication.    History reviewed.   Past Medical History:    Allergic rhinitis    Cataract of both eyes    Cholelithiasis    Depression    Elevated PSA    Hemorrhoids    High triglycerides    IBS (irritable bowel syndrome)    No diabetic retinopathy OU    Osteoarthrosis, unspecified whether generalized or localized, unspecified site    Spinal stenosis    Torn tendon    Right shoulder    Type II or unspecified type diabetes mellitus without mention of complication, not stated as uncontrolled    Unspecified essential hypertension    Vitreous floaters of both eyes       History reviewed.   Past Surgical History:   Procedure Laterality Date    Angioplasty (coronary)  01/01/2011    + stent(s)    Cataract extraction w/  intraocular lens implant Right 03/16/2023    Dr. Gilmore done @ [facility]         Medications :  Prescriptions Prior to Admission[1]     Family History   Problem Relation Age of Onset    Heart Disease Father     Stroke Mother     Pulmonary Disease Brother     Diabetes Brother     Heart Attack Daughter 55    Glaucoma Neg     Macular degeneration Neg        Smoking Status:   Social History     Socioeconomic History    Marital status:    Tobacco Use    Smoking status: Never    Smokeless tobacco: Never   Vaping Use    Vaping status: Never Used   Substance and Sexual Activity    Alcohol use: Yes     Comment: rarely    Drug use: No   Other Topics Concern    Caffeine Concern Yes     Comment: Coffee 1 cup daily    Exercise Yes       Constitutional and vital signs reviewed.      Social History and Family History  elements reviewed from today, pertinent positives to the presenting problem noted.    Physical Exam     ED Triage Vitals [02/27/25 2342]   /60   Pulse 64   Resp 18   Temp 97.4 °F (36.3 °C)   Temp src Temporal   SpO2 98 %   O2 Device None (Room air)       All measures to prevent infection transmission during my interaction with the patient were taken. Handwashing was performed prior to and after the exam.  Stethoscope and any equipment used during my examination was cleaned with super sani-cloth germicidal wipes following the exam.     Physical Exam  Vitals and nursing note reviewed.   Constitutional:       General: He is not in acute distress.     Appearance: He is well-developed. He is not ill-appearing or toxic-appearing.   HENT:      Head: Normocephalic and atraumatic.   Eyes:      General:         Right eye: No discharge.         Left eye: No discharge.      Conjunctiva/sclera: Conjunctivae normal.   Neck:      Trachea: No tracheal deviation.   Cardiovascular:      Rate and Rhythm: Normal rate and regular rhythm.   Pulmonary:      Effort: Pulmonary effort is normal. No respiratory distress.      Breath sounds: No stridor.   Abdominal:      General: There is no distension.      Palpations: Abdomen is soft.      Tenderness: There is no abdominal tenderness.   Musculoskeletal:         General: No deformity.   Skin:     General: Skin is warm and dry.   Neurological:      General: No focal deficit present.      Mental Status: He is alert.   Psychiatric:         Mood and Affect: Mood normal.         Behavior: Behavior normal.         ED Course        Labs Reviewed   POCT GLUCOSE - Abnormal; Notable for the following components:       Result Value    POC Glucose  41 (*)     All other components within normal limits   POCT GLUCOSE - Abnormal; Notable for the following components:    POC Glucose  150 (*)     All other components within normal limits   POCT GLUCOSE - Abnormal; Notable for the following components:     POC Glucose  139 (*)     All other components within normal limits   RAINBOW DRAW LAVENDER   RAINBOW DRAW LIGHT GREEN   RAINBOW DRAW BLUE   RAINBOW DRAW GOLD       As Interpreted by me    Imaging Results Available and Reviewed while in ED: No results found.  ED Medications Administered:   Medications   dextrose 50% injection 50 mL (50 mL Intravenous Given 2/27/25 2343)         MDM     Vitals:    02/27/25 2342 02/28/25 0000 02/28/25 0030 02/28/25 0130   BP: 151/60 147/60 132/74 134/62   Pulse: 64 60 62 59   Resp: 18 19 19 18   Temp: 97.4 °F (36.3 °C)      TempSrc: Temporal      SpO2: 98% 98% 95% 94%   Weight: 70.8 kg        *I personally reviewed and interpreted all ED vitals.    Pulse Ox: 94%, Room air, Normal     Monitor Interpretation:   normal sinus rhythm as interpreted by me.  The cardiac monitor was ordered to monitor heart rate.    Differential Diagnosis/ Diagnostic Considerations: Hypoglycemia, medication reaction, other    Complicating Factors: The patient already has does not have any pertinent problems on file. to contribute to the complexity of this ED evaluation.    Medical Decision Making  Patient presents to the ED after developing hypoglycemia at home.  This resolved but oral glucose and D50.  Blood glucose level stable following.  Patient without further complaints on reevaluation and would like to go home.  I feel stable to do so given close glucose monitoring and PCP follow-up.    Problems Addressed:  Hypoglycemic episode in patient with diabetes mellitus (HCC): acute illness or injury that poses a threat to life or bodily functions    Amount and/or Complexity of Data Reviewed  Independent Historian: EMS     Details: EMS provides history details  Labs: ordered. Decision-making details documented in ED Course.        Condition upon leaving the department: Stable    Disposition and Plan     Clinical Impression:  1. Hypoglycemic episode in patient with diabetes mellitus (HCC)         Disposition:  Discharge    Follow-up:  Jose Angel Macias MD  [address] [place] [id number]  [phone]    Schedule an appointment as soon as possible for a visit in 3 day(s)        Medications Prescribed:  Discharge Medication List as of 2/28/2025  1:44 AM                           [1] (Not in a hospital admission)

## 2025-02-28 NOTE — TELEPHONE ENCOUNTER
Patient's wife Leta is calling yesterday patient was taken to the ER for low blood sugar it was 30.  This past week patient has not been doing well.    Patient is on Metformin should patient take the medication today?    Patient is scheduled to see Dr Macias on 3/3.    Please call Leta [phone]

## 2025-02-28 NOTE — TELEPHONE ENCOUNTER
Hold further metformin use until he sees me next week 3/3.  Should check blood sugars at least 2-3 times a day, 1 fasting in the morning and then in the evening.  Bring the log to our visit next week

## 2025-02-28 NOTE — ED INITIAL ASSESSMENT (HPI)
Pt arrives via EMS from home w/co hypoglycemia and unwitnessed fall. Per EMS pt's family for pt's hypoglycemia. EMS endorsed pt has hx of dementia and is a&ox1 upon arrival to the ED (unknown if pt's baseline). Pt received oral glucose by EMS PTA. Upon arrival pt's repeat BG 41. PIV established and dextrose given.   Unable to obtained full hx during assessment d/t pt's mentation awaiting family.

## 2025-02-28 NOTE — TELEPHONE ENCOUNTER
's message below relayed to patient's wife Leta. She verbalized understanding and will let the patient know.

## 2025-03-02 NOTE — PROGRESS NOTES
Chief Complaint:   Chief Complaint   Patient presents with    Follow - Up     3 mo follow up  Foot exam  Went to ER last Thursday for Hypoglycemia, feels fine since then   Picked up glucose meter and has been monitoring at home          HPI:     Mr. LUBIN is a 84 year old male PMHX coronary artery disease, type 2 diabetes, hypertension, hyperlipidemia, peripheral polyneuropathy, IBS, GERD, lumbar spinal stenosis with chronic back pain, BPH, stage III CKD coming in for follow-up.    Of note, patient was seen in the ER 2/27/2025 with hypoglycemia below 40, there was confusion but responded to medical management with improvement of sugar levels.    Sugars at home. Has been using the Metformin as needed for 180-270s at home.    Cereal yogurt, protein shake. A little bit of sweet potato. Pork tenderloin. He has some sweet foods. Tries to stay regimented with the food intake.    Still with intermittent stomach pain. It starts 2 pm, line in the middle of two fingers across the abdomen. It can keep him from eating. WHere he is more hesitant. No movement, nor GERD.     Not drinking water.     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 and will require a pediatric ET tube if procedure needed    Past Medical History:    Allergic rhinitis    Anxiety    Arthritis    Cataract of both eyes    Cholelithiasis    Depression    Elevated PSA    Hemorrhoids    High triglycerides    IBS (irritable bowel syndrome)    No diabetic retinopathy OU    Osteoarthrosis, unspecified whether generalized or localized, unspecified site    Spinal stenosis    Torn tendon    Right shoulder    Type II or unspecified type diabetes mellitus without mention of complication, not stated as uncontrolled    Unspecified essential hypertension    Vitreous floaters of both eyes     Past Surgical History:   Procedure Laterality Date    Angioplasty (coronary)  01/01/2011    + stent(s)    Appendectomy  1970    Back surgery  2005 ?    Cataract  2023    Cataract  extraction w/  intraocular lens implant Right 03/16/2023    Dr. Gilmore done @ [facility]    Colonoscopy  2015 ?     Social History:  Social History     Socioeconomic History    Marital status:    Tobacco Use    Smoking status: Never    Smokeless tobacco: Never   Vaping Use    Vaping status: Never Used   Substance and Sexual Activity    Alcohol use: Not Currently     Comment: Very occasionally a glass of beer    Drug use: No   Other Topics Concern    Caffeine Concern Yes     Comment: Coffee 1 cup daily    Exercise Yes   Social History Narrative    The patient does not use an assistive device..      The patient does live in a home with stairs.     Family History:  Family History   Problem Relation Age of Onset    Heart Disease Father     Stroke Mother     Pulmonary Disease Brother     Diabetes Brother     Heart Attack Daughter 55    Glaucoma Neg     Macular degeneration Neg      Allergies:  Allergies   Allergen Reactions    Ace Inhibitors Coughing     Other reaction(s): Cough     Current Meds:  Current Outpatient Medications   Medication Sig Dispense Refill    tamsulosin 0.4 MG Oral Cap Take 1 capsule (0.4 mg total) by mouth daily. 90 capsule 3    ZOLPIDEM 10 MG Oral Tab TAKE 1 TABLET BY MOUTH NIGHTLY AS NEEDED FOR SLEEP 90 tablet 1    MEMANTINE 5 MG Oral Tab TAKE 1 TABLET DAILY 90 tablet 1    dutasteride 0.5 MG Oral Cap Take 1 capsule (0.5 mg total) by mouth daily. 90 capsule 3    DULoxetine 30 MG Oral Cap DR Particles Take 1 capsule (30 mg total) by mouth daily. 90 capsule 3    atorvastatin 20 MG Oral Tab Take 1 tablet (20 mg total) by mouth daily. 90 tablet 3    cyanocobalamin 1000 MCG Oral Tab Take 2 tablets (2,000 mcg total) by mouth daily. 180 tablet 1    Ferrous Sulfate 325 (65 Fe) MG Oral Tab Take 1 tablet (325 mg total) by mouth every other day. 45 tablet 1    amLODIPine 10 MG Oral Tab Take 1 tablet (10 mg total) by mouth daily. 90 tablet 3    ipratropium 0.06 % Nasal Solution 2 sprays by Nasal route 4 (four)  times daily. 60 mL 3    metFORMIN HCl 1000 MG Oral Tab Take 1 tablet (1,000 mg total) by mouth 2 (two) times daily with meals. 180 tablet 3    celecoxib 200 MG Oral Cap Take 1 capsule (200 mg total) by mouth daily. 90 capsule 3    LOSARTAN 100 MG Oral Tab TAKE 1 TABLET DAILY 90 tablet 3    METOPROLOL SUCCINATE ER 25 MG Oral Tablet 24 Hr TAKE 1 TABLET DAILY 90 tablet 3    Cholecalciferol (VITAMIN D3) 25 MCG (1000 UT) Oral Cap Take 1 tablet by mouth daily. 2000 units daily      LORAZEPAM 0.5 MG Oral Tab TAKE 1 TABLET BY MOUTH TWICE A DAY AS NEEDED 30 tablet 5    Coenzyme Q10 (CO Q-10) 200 MG Oral Cap Take 1 tablet by mouth daily.      aspirin 81 MG Oral Tab EC Take 1 tablet (81 mg total) by mouth daily.      donepezil (ARICEPT) 10 MG Oral Tab Take 1 tablet (10 mg total) by mouth nightly. 90 tablet 3      Counseling given: Not Answered       REVIEW OF SYSTEMS:   Positive Findings indicated in BOLD    Constitutional: Fever, Chills, Weight Gain, Weight Loss - stable, Night Sweats, Fatigue, Malaise  ENT/Mouth:  Hearing Changes, Ear Pain, Nasal Congestion, Sinus Pain, Hoarseness, Sore throat, Rhinorrhea, Swallowing Difficulty  Eyes: Eye Pain, Swelling, Redness, Foreign Body, Discharge, Vision Changes  Cardiovascular: Chest Pain, SOB, PND, Dyspnea on Exertion, Orthopnea, Claudication, Edema, Palpitations  Respiratory: Cough, Sputum, Wheezing, Shortness of breath  Gastrointestinal: Nausea, Vomiting, Diarrhea, Constipation, Pain, Heartburn, Dysphagia, Bloody stools, Tarry stools  Genitourinary: Dysmenorrhea, Dysuria, Urinary Frequency, Hematuria, Urinary Incontinence, Urgency,  Flank Pain  Musculoskeletal: Arthralgias, Myalgias, Joint Swelling, Joint Stiffness, Back Pain, Neck Pain  Integumentary: Skin Lesions, Pruritis, Hair Changes, Jaundice, Nail changes  Neuro: Weakness, Numbness, Paresthesias, Loss of Consciousness, Syncope, Dizziness, Headache, Falls  Psych: Anxiety, Depression, Insomnia, Suicidal Ideation, Homicidal  ideation, Memory Changes  Heme/Lymph: Bruising, Bleeding, Lymphadenopathy  Endocrine: Polyuria, Polydipsia, Temperature Intolerance    EXAM:   Vital Signs:  Blood pressure 114/54, pulse 54, temperature 97.3 °F (36.3 °C), temperature source Tympanic, height 5' 7\" (1.702 m), weight 157 lb (71.2 kg), SpO2 98%.     Constitutional: No acute distress. Alert and oriented x 3.  Eyes: EOMI, PERRLA, clear sclera b/l  HENT: NCAT, Moist mucous membranes, Oropharynx without erythema or exudates  Cardiovascular: S1, S2, no S3, no S4, Regular rate and rhythm, No murmurs/gallops/rubs.   Respiratory: Clear to auscultation bilaterally.  No wheezes/rales/rhonchi  Gastrointestinal: Soft, nontender, nondistended. Positive bowel sounds x 4. No rebound tenderness. No hepatomegaly, No splenomegaly  Genitourinary: No CVA tenderness bilaterally  Neurologic: No focal neurological deficits, CN II-XII intact, light touch intact, MSK Strength 5/5 and symmetric in all extremities, normal gait, 2+ patellar tendon  Musculoskeletal: Full range of motion of all extremities, no clubbing/swelling/edema  Skin: No lesions, No erythema, no jaundice, Cap Refill < 2s  Psychiatric: Appropriate mood and affect  Heme/Lymph/Immune: No cervical LAD        DATA REVIEWED   Labs:  Recent Results (from the past 8760 hours)   Comp Metabolic Panel (14)    Collection Time: 02/27/25  8:13 AM   Result Value Ref Range    Glucose 59 (L) 70 - 99 mg/dL    Sodium 142 136 - 145 mmol/L    Potassium 4.0 3.5 - 5.1 mmol/L    Chloride 104 98 - 112 mmol/L    CO2 28.0 21.0 - 32.0 mmol/L    Anion Gap 10 0 - 18 mmol/L    BUN 18 9 - 23 mg/dL    Creatinine 1.10 0.70 - 1.30 mg/dL    BUN/CREA Ratio 16.4 10.0 - 20.0    Calcium, Total 9.4 8.7 - 10.4 mg/dL    Calculated Osmolality 294 275 - 295 mOsm/kg    eGFR-Cr 66 >=60 mL/min/1.73m2    ALT 11 10 - 49 U/L    AST 19 <34 U/L    Alkaline Phosphatase 71 45 - 117 U/L    Bilirubin, Total 0.7 0.2 - 1.1 mg/dL    Total Protein 6.4 5.7 - 8.2 g/dL     Albumin 4.3 3.2 - 4.8 g/dL    Globulin  2.1 2.0 - 3.5 g/dL    A/G Ratio 2.0 1.0 - 2.0    Patient Fasting for CMP? Yes      *Note: Due to a large number of results and/or encounters for the requested time period, some results have not been displayed. A complete set of results can be found in Results Review.       Recent Results (from the past 8760 hours)   CBC With Differential With Platelet    Collection Time: 02/27/25  8:13 AM   Result Value Ref Range    WBC 8.1 4.0 - 11.0 x10(3) uL    RBC 4.57 3.80 - 5.80 x10(6)uL    HGB 13.9 13.0 - 17.5 g/dL    HCT 40.5 39.0 - 53.0 %    MCV 88.6 80.0 - 100.0 fL    MCH 30.4 26.0 - 34.0 pg    MCHC 34.3 31.0 - 37.0 g/dL    RDW-SD 44.5 35.1 - 46.3 fL    RDW 13.6 11.0 - 15.0 %    .0 (L) 150.0 - 450.0 10(3)uL    Neutrophil Absolute Prelim 4.99 1.50 - 7.70 x10 (3) uL    Neutrophil Absolute 4.99 1.50 - 7.70 x10(3) uL    Lymphocyte Absolute 2.31 1.00 - 4.00 x10(3) uL    Monocyte Absolute 0.61 0.10 - 1.00 x10(3) uL    Eosinophil Absolute 0.15 0.00 - 0.70 x10(3) uL    Basophil Absolute 0.01 0.00 - 0.20 x10(3) uL    Immature Granulocyte Absolute 0.02 0.00 - 1.00 x10(3) uL    Neutrophil % 61.7 %    Lymphocyte % 28.6 %    Monocyte % 7.5 %    Eosinophil % 1.9 %    Basophil % 0.1 %    Immature Granulocyte % 0.2 %     *Note: Due to a large number of results and/or encounters for the requested time period, some results have not been displayed. A complete set of results can be found in Results Review.     MRI abdomen and pelvis 10/8/2024    Impression   CONCLUSION:  1. Stable small right adrenal nodule, which is characterized as a benign adenoma.  2. Cholelithiasis.  3. Stable simple hepatic and renal cysts.  4. Lesser incidental findings as above.          MRI prostate 10/9/2024  Impression   CONCLUSION:  1. Limited MRI of the prostate gland due to technical difficulties with the IV contrast injection.    2. Two small lesions in the peripheral zone at the prostate apex, both measuring  approximately 1.0 cm in size.  These include a lesion at 5:30 and a lesion at 7 o'clock.  PI-RADS CLASSIFICATION:  PI-RADS 4 - High (clinically significant cancer is likely  to be present).  Note that PI-RADS classification of these lesions would not change with IV contrast.    3. Additional approximately 1.0 cm sized lesion in the peripheral zone at 7 o'clock at the base of the prostate gland that is more indeterminate.  PI-RADS CLASSIFICATION:  PI-RADS 3 - Intermediate (the presence of clinically significant cancer is  equivocal).  Note this lesion could potentially be upgraded to a PI-RADS 4 lesion if IV contrast demonstrated suspicious enhancement characteristics.  4. No evidence of extracapsular extension of tumor, pelvic lymphadenopathy or osseous metastases.      5. Prostate gland is moderately enlarged and demonstrates benign prostatic hypertrophy.     X-ray right shoulder 10/17/2024    Impression   CONCLUSION:  1. Primary osteoarthritic changes of the right shoulder are apparent.     2. No radiographically visible acute osseous injury of the right shoulder.     3. Radiographic findings which could be indicative of underlying calcific tendinitis of the right supraspinatus tendon.     4. Lateral acromial downsloping could predispose to impingement syndrome in the appropriate clinical setting.       ASSESSMENT AND PLAN:     Weight loss with anorexia  Patient has been losing weight over time.  Patient is lost 6 pounds since he was here in March.  In the past patient weighed around 200 to 205 pounds.  Patient is now down to 157 pounds.  - On Dr. Mendiola's initial evaluation, he is concerned about occult malignancy given significant weight loss unintentionally, significant anemia, complaints of abdominal pain, elevated PSA  - Noted multiple nutritional deficiencies and iron deficiency, folic acid deficiency, borderline vitamin B12 deficiency  - MRI of the prostate did show lesions PI-RADS 4 in the peripheral  zone of the prostate apex, PI-RADS 3 the limited without use of IV contrast.  Clinically significant cancer likely to be present.  He did have follow-up with urology 10/22 for which Kerri electing for observation and surveillance PSA every 6 months.  -Recall that colonoscopy was being arranged as part of our workup.  However, he would like to hold off on this  - Did have hypoglycemic event for which we will decrease metformin to once a day.  Best to take in the morning when he has more consistent oral intake.  - Question of pancreatic insufficiency, his lipase was elevated on the last check.  Advised to increase water intake as able    ASHD (arteriosclerotic heart disease)  History of PCI of LAD and RCA more than 10 years ago  - Undergoing Lexiscan stress test with Dr. Wade  -Planning for repeat echocardiogram on an annual basis  - On aspirin 81 mg daily, control of cholesterol as below  -Discussed with patient and his wife Shanice, we should proceed with noninvasive cardiac workup that was recommended back in March by Dr. Wade.  We need a stress test, echocardiogram.  Aware that if there is any abnormalities given his heart history, cardiac catheterization may be indicated.  May need to follow-up with cardiology regarding this.  - Would advise cardiac clearance prior to any potential invasive procedures in the near future  - He is amenable to proceeding with Lexiscan stress test.  If normal, can proceed with gastrointestinal workup further     Type 2 diabetes mellitus without complication, without long-term current use of insulin (HCC)  Recent A1c:   HgbA1C (%)   Date Value   02/27/2025 5.4     Recent urine microalbumin: No components found for: \"URINEMICROALBUMIN\"  Current medications: 1000 mg metformin twice a day  Eye exam: Due for diabetic eye exam  Foot exam: Check feet daily  - A1c remains controlled.  Will decrease metformin to once a day as he is having spikes in the 180-270.    Hypertension  -Blood  pressure today at goal  - Check blood pressures at home  - Continue with home amlodipine 10 mg daily, losartan 100 mg daily, metoprolol succinate 25 mg daily     Hyperlipidemia  -Last lipid panel remains at goal  - Repeat fasting lipid panel at next visit  - Continue with atorvastatin 20 mg daily      Mitral valve insufficiency, unspecified etiology  Aortic valve stenosis, etiology of cardiac valve disease unspecified  -Being monitored by cardiology, Dr. Wade, last TTE with moderate mitral regurgitation but normal left ventricular ejection fraction and chamber size.  Plan to repeat TTE ED annually     Peripheral polyneuropathy  -Consider effects of diabetic polyneuropathy.  Should also consider vitamin B12 relative deficiency as we are on the lower end of normal  - We will continue glycemic control, vitamin B12 supplementation as below     Gastroesophageal reflux disease, unspecified whether esophagitis present  Irritable bowel syndrome without diarrhea  -On famotidine 20 mg twice a day  - H. pylori breath test was negative  - MRI abdomen otherwise without significant abnormalities    Stage 3a chronic kidney disease (HCC)  Patient's recent creatinine 1.10, EGFR 66  -Will monitor closely  - Continue with adequate water intake     Anemia, with iron deficiency  Thrombocytopenia  -Hemoglobin 13.9, folate repleted, vitamin B12 repleted.  - Folic acid levels at goal.  Okay to discontinue  - Vitamin B12 now repleted.  Would recommend discontinuing and following closely.  - Ferritin levels remain low, but his anemia has improved.  Would continue ferrous sulfate 325 mg daily.  Monitor for constipation  -We will monitor CBCs     Prostate nodule  BPH with LUTS  Patient has a prostate nodule involving the left lobe of his prostate which is suspicious for prostate cancer.  Patient will have a diagnostic PSA.  -PSA elevated 7.50  - For concern of prostate cancer, does require imaging ideally with CT scan  - Established with  urology, Dr. Barclay most recently seen 10/22.  Patient's preference is observation and plans for repeat PSA and prostate exam in 6 months.  Continued on tamsulosin and dutasteride    Spinal stenosis of lumbar region, unspecified whether neurogenic claudication   Chronic bilateral low back pain without sciatica  Patient complains of much pain in his low back region.  He also has some pain in the upper back region.  -Acetaminophen 500-650 mg every 4-6 hours as needed for pain relief  - Should avoid NSAIDs given history of chronic kidney disease  - On duloxetine 30 mg daily  - Notify us if pain symptoms worsen over time    Memory changes  - May be age-related, however there are likely some contribution with vitamin B12 deficiency  - We will proceed with vitamin B12 supplementation, 1000 mcg once a day  - Vitamin B12 has now been repleted    Onychomycosis  - If LFTs stable, we will proceed with terbinafine daily for 6 weeks.       Orders This Visit:  Orders Placed This Encounter   Procedures    Comp Metabolic Panel (14)    CBC With Differential With Platelet    Lipase [E]    Amylase [E]       Meds This Visit:  Requested Prescriptions      No prescriptions requested or ordered in this encounter       Imaging & Referrals:  CARD NUC STRESS TEST LEXISCAN (CPT 08795/53503/)     Health Maintenance  Last Medicare physical 6/19/2024  - Due for dilated eye exam for diabetes      Spent 45 minutes obtaining history, evaluating patient, discussing treatment options, diet, exercise, review of available labs and radiology reports, and completing documentation.       Return to clinic in 3 months for Medicare annual physical exam    Jose Angel Macias MD, 03/03/25, 2:24 PM

## 2025-03-02 NOTE — PATIENT INSTRUCTIONS
You are seen in clinic today for follow-up.        - We are checking some blood test as your lipase level was elevated suggestive of  Because of the hypoglycemic event, Decrease metformin to once a day in the morning.  -    Today, we did review your last set of workup with Dr. Barclay  -Given risks for potential procedures, treatments, we are monitoring with urology on prostate levels and examinations  - Ideally would pursue colonoscopy, but as long as weight remains stable we can continue to observe for now    We can discontinue the famotidine, cetirizine    We need adequate nutrition, calorie intake is most important.  No specific restrictions on the diet  - Ideally we have increase in protein, good fiber intake to modulate the bowel movements.    Keep up with good water intake for the kidneys.  Staying hydrated can help aid in blood sugar checks    It may still be reasonable to proceed with noninvasive cardiac testing such as stress test, echocardiogram even if we do not plan on procedures in the future      Return to clinic in 3 months for Medicare annual physical exam.

## 2025-03-03 ENCOUNTER — LAB ENCOUNTER (OUTPATIENT)
Dept: LAB | Age: 85
End: 2025-03-03
Attending: INTERNAL MEDICINE
Payer: MEDICARE

## 2025-03-03 ENCOUNTER — OFFICE VISIT (OUTPATIENT)
Dept: INTERNAL MEDICINE CLINIC | Facility: CLINIC | Age: 85
End: 2025-03-03

## 2025-03-03 VITALS
BODY MASS INDEX: 24.64 KG/M2 | OXYGEN SATURATION: 98 % | HEIGHT: 67 IN | SYSTOLIC BLOOD PRESSURE: 114 MMHG | HEART RATE: 54 BPM | WEIGHT: 157 LBS | DIASTOLIC BLOOD PRESSURE: 54 MMHG | TEMPERATURE: 97 F

## 2025-03-03 DIAGNOSIS — E11.9 TYPE 2 DIABETES MELLITUS WITHOUT COMPLICATION, WITHOUT LONG-TERM CURRENT USE OF INSULIN (HCC): ICD-10-CM

## 2025-03-03 DIAGNOSIS — E53.8 VITAMIN B12 DEFICIENCY: ICD-10-CM

## 2025-03-03 DIAGNOSIS — R63.4 UNINTENTIONAL WEIGHT LOSS: ICD-10-CM

## 2025-03-03 DIAGNOSIS — N40.2 PROSTATE NODULE: ICD-10-CM

## 2025-03-03 DIAGNOSIS — E78.00 HYPERCHOLESTEROLEMIA: ICD-10-CM

## 2025-03-03 DIAGNOSIS — R63.0 LOSS OF APPETITE: Primary | ICD-10-CM

## 2025-03-03 DIAGNOSIS — D69.6 THROMBOCYTOPENIA: ICD-10-CM

## 2025-03-03 DIAGNOSIS — G62.9 PERIPHERAL POLYNEUROPATHY: ICD-10-CM

## 2025-03-03 DIAGNOSIS — I10 ESSENTIAL HYPERTENSION: ICD-10-CM

## 2025-03-03 DIAGNOSIS — N18.31 STAGE 3A CHRONIC KIDNEY DISEASE (HCC): ICD-10-CM

## 2025-03-03 DIAGNOSIS — R74.8 ELEVATED LIPASE: ICD-10-CM

## 2025-03-03 DIAGNOSIS — R63.0 LOSS OF APPETITE: ICD-10-CM

## 2025-03-03 DIAGNOSIS — D64.9 ANEMIA, UNSPECIFIED TYPE: ICD-10-CM

## 2025-03-03 DIAGNOSIS — K21.9 GASTROESOPHAGEAL REFLUX DISEASE, UNSPECIFIED WHETHER ESOPHAGITIS PRESENT: ICD-10-CM

## 2025-03-03 DIAGNOSIS — Z13.89 SCREENING FOR NEPHROPATHY: ICD-10-CM

## 2025-03-03 DIAGNOSIS — E55.9 VITAMIN D DEFICIENCY: ICD-10-CM

## 2025-03-03 DIAGNOSIS — E78.1 HYPERTRIGLYCERIDEMIA: ICD-10-CM

## 2025-03-03 DIAGNOSIS — I25.10 ASHD (ARTERIOSCLEROTIC HEART DISEASE): ICD-10-CM

## 2025-03-03 DIAGNOSIS — R41.3 MEMORY PROBLEM: ICD-10-CM

## 2025-03-03 LAB
ALBUMIN SERPL-MCNC: 4.3 G/DL (ref 3.2–4.8)
ALBUMIN/GLOB SERPL: 2 {RATIO} (ref 1–2)
ALP LIVER SERPL-CCNC: 75 U/L
ALT SERPL-CCNC: 30 U/L
AMYLASE SERPL-CCNC: 178 U/L (ref 30–118)
ANION GAP SERPL CALC-SCNC: 7 MMOL/L (ref 0–18)
AST SERPL-CCNC: 27 U/L (ref ?–34)
BASOPHILS # BLD AUTO: 0.01 X10(3) UL (ref 0–0.2)
BASOPHILS NFR BLD AUTO: 0.1 %
BILIRUB SERPL-MCNC: 0.4 MG/DL (ref 0.2–1.1)
BUN BLD-MCNC: 18 MG/DL (ref 9–23)
BUN/CREAT SERPL: 16.5 (ref 10–20)
CALCIUM BLD-MCNC: 9.5 MG/DL (ref 8.7–10.4)
CHLORIDE SERPL-SCNC: 106 MMOL/L (ref 98–112)
CO2 SERPL-SCNC: 29 MMOL/L (ref 21–32)
CREAT BLD-MCNC: 1.09 MG/DL
DEPRECATED RDW RBC AUTO: 44.9 FL (ref 35.1–46.3)
EGFRCR SERPLBLD CKD-EPI 2021: 67 ML/MIN/1.73M2 (ref 60–?)
EOSINOPHIL # BLD AUTO: 0.11 X10(3) UL (ref 0–0.7)
EOSINOPHIL NFR BLD AUTO: 1.4 %
ERYTHROCYTE [DISTWIDTH] IN BLOOD BY AUTOMATED COUNT: 13.7 % (ref 11–15)
FASTING STATUS PATIENT QL REPORTED: NO
GLOBULIN PLAS-MCNC: 2.1 G/DL (ref 2–3.5)
GLUCOSE BLD-MCNC: 103 MG/DL (ref 70–99)
HCT VFR BLD AUTO: 39.7 %
HGB BLD-MCNC: 13.5 G/DL
IMM GRANULOCYTES # BLD AUTO: 0.03 X10(3) UL (ref 0–1)
IMM GRANULOCYTES NFR BLD: 0.4 %
LIPASE SERPL-CCNC: 55 U/L (ref 12–53)
LYMPHOCYTES # BLD AUTO: 1.99 X10(3) UL (ref 1–4)
LYMPHOCYTES NFR BLD AUTO: 24.8 %
MCH RBC QN AUTO: 30.5 PG (ref 26–34)
MCHC RBC AUTO-ENTMCNC: 34 G/DL (ref 31–37)
MCV RBC AUTO: 89.8 FL
MONOCYTES # BLD AUTO: 0.76 X10(3) UL (ref 0.1–1)
MONOCYTES NFR BLD AUTO: 9.5 %
NEUTROPHILS # BLD AUTO: 5.11 X10 (3) UL (ref 1.5–7.7)
NEUTROPHILS # BLD AUTO: 5.11 X10(3) UL (ref 1.5–7.7)
NEUTROPHILS NFR BLD AUTO: 63.8 %
OSMOLALITY SERPL CALC.SUM OF ELEC: 296 MOSM/KG (ref 275–295)
PLATELET # BLD AUTO: 129 10(3)UL (ref 150–450)
POTASSIUM SERPL-SCNC: 4.4 MMOL/L (ref 3.5–5.1)
PROT SERPL-MCNC: 6.4 G/DL (ref 5.7–8.2)
RBC # BLD AUTO: 4.42 X10(6)UL
SODIUM SERPL-SCNC: 142 MMOL/L (ref 136–145)
WBC # BLD AUTO: 8 X10(3) UL (ref 4–11)

## 2025-03-03 PROCEDURE — 36415 COLL VENOUS BLD VENIPUNCTURE: CPT

## 2025-03-03 PROCEDURE — 82150 ASSAY OF AMYLASE: CPT

## 2025-03-03 PROCEDURE — 83690 ASSAY OF LIPASE: CPT

## 2025-03-03 PROCEDURE — 85025 COMPLETE CBC W/AUTO DIFF WBC: CPT

## 2025-03-03 PROCEDURE — 80053 COMPREHEN METABOLIC PANEL: CPT

## 2025-03-05 ENCOUNTER — TELEPHONE (OUTPATIENT)
Dept: INTERNAL MEDICINE CLINIC | Facility: CLINIC | Age: 85
End: 2025-03-05

## 2025-03-05 DIAGNOSIS — R10.9 CHRONIC ABDOMINAL PAIN: Primary | ICD-10-CM

## 2025-03-05 DIAGNOSIS — G89.29 CHRONIC ABDOMINAL PAIN: Primary | ICD-10-CM

## 2025-03-05 NOTE — TELEPHONE ENCOUNTER
Please notify patient that the primary pancreas levels are normalizing.  Amylase is elevated, but is not specific for the pancreas, as this can be elevated in the salivary glands and other parts of the body normally.  Because of his ongoing symptoms, would recommend reevaluation with Dr. Thorpe:    Referred to Provider Information:  Provider Address Phone   Rudolph Thorpe MD [address] [place] [id number] [phone]        As discussed, should at least proceed with a stress test in the event Dr. Thorpe wants to pursue endoscopic evaluation.  Should make these appointments

## 2025-03-10 ENCOUNTER — TELEPHONE (OUTPATIENT)
Dept: INTERNAL MEDICINE CLINIC | Facility: CLINIC | Age: 85
End: 2025-03-10

## 2025-03-10 RX ORDER — FAMOTIDINE 20 MG/1
20 TABLET, FILM COATED ORAL 2 TIMES DAILY
Qty: 180 TABLET | Refills: 3 | Status: SHIPPED | OUTPATIENT
Start: 2025-03-10

## 2025-03-17 ENCOUNTER — HOSPITAL ENCOUNTER (OUTPATIENT)
Dept: CV DIAGNOSTICS | Facility: HOSPITAL | Age: 85
Discharge: HOME | End: 2025-03-17
Attending: INTERNAL MEDICINE
Payer: MEDICARE

## 2025-03-17 ENCOUNTER — HOSPITAL ENCOUNTER (OUTPATIENT)
Dept: NUCLEAR MEDICINE | Facility: HOSPITAL | Age: 85
Discharge: HOME | End: 2025-03-17
Attending: INTERNAL MEDICINE
Payer: MEDICARE

## 2025-03-17 DIAGNOSIS — I25.10 ASHD (ARTERIOSCLEROTIC HEART DISEASE): ICD-10-CM

## 2025-03-17 PROCEDURE — 93018 CV STRESS TEST I&R ONLY: CPT | Performed by: INTERNAL MEDICINE

## 2025-03-17 PROCEDURE — 78452 HT MUSCLE IMAGE SPECT MULT: CPT | Performed by: INTERNAL MEDICINE

## 2025-03-17 PROCEDURE — 93017 CV STRESS TEST TRACING ONLY: CPT | Performed by: INTERNAL MEDICINE

## 2025-03-17 PROCEDURE — 93016 CV STRESS TEST SUPVJ ONLY: CPT | Performed by: INTERNAL MEDICINE

## 2025-03-17 RX ORDER — REGADENOSON 0.08 MG/ML
INJECTION, SOLUTION INTRAVENOUS
Status: COMPLETED
Start: 2025-03-17 | End: 2025-03-17

## 2025-03-17 RX ADMIN — REGADENOSON 0.4 MG: 0.08 INJECTION, SOLUTION INTRAVENOUS at 11:47:00

## 2025-03-18 ENCOUNTER — TELEPHONE (OUTPATIENT)
Dept: INTERNAL MEDICINE CLINIC | Facility: CLINIC | Age: 85
End: 2025-03-18

## 2025-03-18 LAB
% OF MAX PREDICTED HR: 100 %
MAX DIASTOLIC BP: 69 MMHG
MAX HEART RATE: 67 BPM
MAX PREDICTED HEART RATE: 135 BPM
MAX SYSTOLIC BP: 163 MMHG
MAX WORK LOAD: 10

## 2025-03-18 NOTE — TELEPHONE ENCOUNTER
I left a voicemail as I did receive correspondence from radiology that the stress test came back positive.  He was stable on my cardiac exam in the last visit.  However he is at risk for cardiac event such as heart attack in the near future.  Advised him to call back with questions as we had discussed about this on the last visit.  I did provide ER parameters such as chest pain, shortness of breath that is worsening acutely to go straight to the emergency department    I also provided information for his cardiologist, Dr. Wade

## 2025-03-19 ENCOUNTER — TELEPHONE (OUTPATIENT)
Dept: INTERNAL MEDICINE CLINIC | Facility: CLINIC | Age: 85
End: 2025-03-19

## 2025-03-19 NOTE — TELEPHONE ENCOUNTER
[facility] faxed refill requests for     Donepezil 10 mg take 1 tablet nightly    Glimepiride 2 mg take one tablet daily with breakfast

## 2025-03-20 RX ORDER — DONEPEZIL HYDROCHLORIDE 10 MG/1
10 TABLET, FILM COATED ORAL NIGHTLY
Qty: 90 TABLET | Refills: 3 | Status: SHIPPED | OUTPATIENT
Start: 2025-03-20 | End: 2026-03-20

## 2025-04-16 ENCOUNTER — TELEPHONE (OUTPATIENT)
Dept: INTERNAL MEDICINE CLINIC | Facility: CLINIC | Age: 85
End: 2025-04-16

## 2025-04-16 RX ORDER — METOPROLOL SUCCINATE 25 MG/1
25 TABLET, EXTENDED RELEASE ORAL DAILY
Qty: 90 TABLET | Refills: 3 | Status: SHIPPED | OUTPATIENT
Start: 2025-04-16

## 2025-04-16 NOTE — TELEPHONE ENCOUNTER
To Dr. Macias for initial RX(s)     Previously prescribed by patient's former PCP, Dr. Mendiola.     Established with you on 7/8/2024, LOV 3/3/2025.     Thank you!

## 2025-04-28 ENCOUNTER — OFFICE VISIT (OUTPATIENT)
Dept: SURGERY | Facility: CLINIC | Age: 85
End: 2025-04-28
Payer: MEDICARE

## 2025-04-28 DIAGNOSIS — R97.20 ELEVATED PSA: Primary | ICD-10-CM

## 2025-04-28 DIAGNOSIS — E27.8 ADRENAL MASS (HCC): ICD-10-CM

## 2025-04-28 DIAGNOSIS — N40.2 NODULAR PROSTATE: ICD-10-CM

## 2025-04-28 PROCEDURE — 1159F MED LIST DOCD IN RCRD: CPT | Performed by: UROLOGY

## 2025-04-28 PROCEDURE — 99213 OFFICE O/P EST LOW 20 MIN: CPT | Performed by: UROLOGY

## 2025-04-28 NOTE — PROGRESS NOTES
Kashif Suero is a 84 year old male.    HPI:     Chief Complaint   Patient presents with    Follow - Up     The patient is here today for follow up elevated PSA. The patient is here with his spouse.  The patient reports he has less urination.  The patient  wakes up 1 to 2 times every night.        84-year-old male accompanied by his wife in follow-up to the visit October 2020 for for nodular prostate on exam, elevated serum PSA, unexplained weight loss and poor appetite.  There is no known family history of prostate cancer.  Today, the patient feels well.  States his weight has been stable.  Denies any pain.  Denies any bothersome urination symptoms.  IPSS score is 4 quality-of-life index is 2.  No gross hematuria or dysuria.  An MRI of the prostate performed October 2024 demonstrated a PI-RADS 4 lesion in the prostate.  He is also known to have a benign-appearing adrenal nodule on MRI of the abdomen with features suggestive of adenoma.      HISTORY:  Past Medical History[1]   Past Surgical History[2]   Family History[3]   Social History: Short Social Hx on File[4]     Medications (Active prior to today's visit):  Current Medications[5]    Allergies:  Allergies[6]      ROS:       PHYSICAL EXAM:        ASSESSMENT/PLAN:   Assessment   Encounter Diagnoses   Name Primary?    Elevated PSA Yes    Adrenal mass (HCC)     Nodular prostate        Reviewed findings at length with the patient.  Suggested follow-up on a as needed basis if he experiences signs or symptoms suggestive of prostate cancer progression including changes in his urination symptoms unexplained weight loss, bone pain, blood in the urine.  Patient and wife understands plan and agrees and will follow-up accordingly.         Orders This Visit:  No orders of the defined types were placed in this encounter.      Meds This Visit:  Requested Prescriptions      No prescriptions requested or ordered in this encounter       Imaging & Referrals:  None      4/28/2025  Aimee Barclay MD               [1]   Past Medical History:   Allergic rhinitis    Anxiety    Arthritis    Cataract of both eyes    Cholelithiasis    Depression    Elevated PSA    Hemorrhoids    High triglycerides    IBS (irritable bowel syndrome)    No diabetic retinopathy OU    Osteoarthrosis, unspecified whether generalized or localized, unspecified site    Spinal stenosis    Torn tendon    Right shoulder    Type II or unspecified type diabetes mellitus without mention of complication, not stated as uncontrolled    Unspecified essential hypertension    Vitreous floaters of both eyes   [2]   Past Surgical History:  Procedure Laterality Date    Angioplasty (coronary)  01/01/2011    + stent(s)    Appendectomy  1970    Back surgery  2005 ?    Cataract  2023    Cataract extraction w/  intraocular lens implant Right 03/16/2023    Dr. Gilmore done @ [facility]    Colonoscopy  2015 ?   [3]   Family History  Problem Relation Age of Onset    Heart Disease Father     Stroke Mother     Pulmonary Disease Brother     Diabetes Brother     Heart Attack Daughter 55    Glaucoma Neg     Macular degeneration Neg    [4]   Social History  Socioeconomic History    Marital status:    Tobacco Use    Smoking status: Never    Smokeless tobacco: Never   Vaping Use    Vaping status: Never Used   Substance and Sexual Activity    Alcohol use: Not Currently     Comment: Very occasionally a glass of beer    Drug use: No   Other Topics Concern    Caffeine Concern Yes     Comment: Coffee 1 cup daily    Exercise Yes   Social History Narrative    The patient does not use an assistive device..      The patient does live in a home with stairs.   [5]   Current Outpatient Medications   Medication Sig Dispense Refill    metoprolol succinate ER 25 MG Oral Tablet 24 Hr Take 1 tablet (25 mg total) by mouth daily. 90 tablet 3    donepezil (ARICEPT) 10 MG Oral Tab Take 1 tablet (10 mg total) by mouth nightly. 90 tablet 3    famotidine 20 MG Oral  Tab Take 1 tablet (20 mg total) by mouth 2 (two) times daily. 180 tablet 3    tamsulosin 0.4 MG Oral Cap Take 1 capsule (0.4 mg total) by mouth daily. 90 capsule 3    ZOLPIDEM 10 MG Oral Tab TAKE 1 TABLET BY MOUTH NIGHTLY AS NEEDED FOR SLEEP 90 tablet 1    MEMANTINE 5 MG Oral Tab TAKE 1 TABLET DAILY 90 tablet 1    dutasteride 0.5 MG Oral Cap Take 1 capsule (0.5 mg total) by mouth daily. 90 capsule 3    DULoxetine 30 MG Oral Cap DR Particles Take 1 capsule (30 mg total) by mouth daily. 90 capsule 3    atorvastatin 20 MG Oral Tab Take 1 tablet (20 mg total) by mouth daily. 90 tablet 3    cyanocobalamin 1000 MCG Oral Tab Take 2 tablets (2,000 mcg total) by mouth daily. 180 tablet 1    amLODIPine 10 MG Oral Tab Take 1 tablet (10 mg total) by mouth daily. 90 tablet 3    ipratropium 0.06 % Nasal Solution 2 sprays by Nasal route 4 (four) times daily. 60 mL 3    metFORMIN HCl 1000 MG Oral Tab Take 1 tablet (1,000 mg total) by mouth 2 (two) times daily with meals. 180 tablet 3    celecoxib 200 MG Oral Cap Take 1 capsule (200 mg total) by mouth daily. 90 capsule 3    LOSARTAN 100 MG Oral Tab TAKE 1 TABLET DAILY 90 tablet 3    Cholecalciferol (VITAMIN D3) 25 MCG (1000 UT) Oral Cap Take 1 tablet by mouth daily. 2000 units daily      LORAZEPAM 0.5 MG Oral Tab TAKE 1 TABLET BY MOUTH TWICE A DAY AS NEEDED 30 tablet 5    Coenzyme Q10 (CO Q-10) 200 MG Oral Cap Take 1 tablet by mouth daily.      aspirin 81 MG Oral Tab EC Take 1 tablet (81 mg total) by mouth daily.     [6]   Allergies  Allergen Reactions    Ace Inhibitors Coughing     Other reaction(s): Cough

## 2025-05-16 RX ORDER — LOSARTAN POTASSIUM 100 MG/1
100 TABLET ORAL DAILY
Qty: 90 TABLET | Refills: 3 | Status: SHIPPED | OUTPATIENT
Start: 2025-05-16

## 2025-05-28 RX ORDER — CELECOXIB 200 MG/1
200 CAPSULE ORAL DAILY
Qty: 90 CAPSULE | Refills: 3 | Status: SHIPPED | OUTPATIENT
Start: 2025-05-28

## 2025-05-28 NOTE — TELEPHONE ENCOUNTER
Refill request is for a maintenance medication and has met the criteria specified in the Ambulatory Medication Refill Standing Order for eligibility, visits, laboratory, alerts and was sent to the requested pharmacy.    Requested Prescriptions     Signed Prescriptions Disp Refills    CELECOXIB 200 MG Oral Cap 90 capsule 3     Sig: TAKE 1 CAPSULE DAILY     Authorizing Provider: ALESSANDRA WREN     Ordering User: DHRUV JARRETT

## 2025-06-02 ENCOUNTER — LAB ENCOUNTER (OUTPATIENT)
Dept: LAB | Age: 85
End: 2025-06-02
Attending: INTERNAL MEDICINE
Payer: MEDICARE

## 2025-06-02 ENCOUNTER — TELEPHONE (OUTPATIENT)
Dept: INTERNAL MEDICINE CLINIC | Facility: CLINIC | Age: 85
End: 2025-06-02

## 2025-06-02 DIAGNOSIS — R74.8 ELEVATED LIPASE: ICD-10-CM

## 2025-06-02 DIAGNOSIS — E78.00 HYPERCHOLESTEROLEMIA: ICD-10-CM

## 2025-06-02 DIAGNOSIS — E55.9 VITAMIN D DEFICIENCY: ICD-10-CM

## 2025-06-02 DIAGNOSIS — D69.6 THROMBOCYTOPENIA: ICD-10-CM

## 2025-06-02 DIAGNOSIS — G62.9 PERIPHERAL POLYNEUROPATHY: ICD-10-CM

## 2025-06-02 DIAGNOSIS — E11.9 DIABETES MELLITUS TYPE 2 WITHOUT RETINOPATHY (HCC): ICD-10-CM

## 2025-06-02 DIAGNOSIS — E11.9 TYPE 2 DIABETES MELLITUS WITHOUT COMPLICATION, WITHOUT LONG-TERM CURRENT USE OF INSULIN (HCC): ICD-10-CM

## 2025-06-02 DIAGNOSIS — E53.8 FOLATE DEFICIENCY: ICD-10-CM

## 2025-06-02 DIAGNOSIS — R41.3 MEMORY PROBLEM: ICD-10-CM

## 2025-06-02 DIAGNOSIS — N18.31 STAGE 3A CHRONIC KIDNEY DISEASE (HCC): ICD-10-CM

## 2025-06-02 DIAGNOSIS — E11.9 TYPE 2 DIABETES MELLITUS WITHOUT COMPLICATION, WITHOUT LONG-TERM CURRENT USE OF INSULIN (HCC): Primary | ICD-10-CM

## 2025-06-02 DIAGNOSIS — R53.83 FATIGUE, UNSPECIFIED TYPE: ICD-10-CM

## 2025-06-02 LAB
ALBUMIN SERPL-MCNC: 4.4 G/DL (ref 3.2–4.8)
ALBUMIN/GLOB SERPL: 2.1 {RATIO} (ref 1–2)
ALP LIVER SERPL-CCNC: 99 U/L (ref 45–117)
ALT SERPL-CCNC: 36 U/L (ref 10–49)
ANION GAP SERPL CALC-SCNC: 8 MMOL/L (ref 0–18)
AST SERPL-CCNC: 28 U/L (ref ?–34)
BASOPHILS # BLD AUTO: 0.01 X10(3) UL (ref 0–0.2)
BASOPHILS NFR BLD AUTO: 0.1 %
BILIRUB SERPL-MCNC: 0.8 MG/DL (ref 0.2–1.1)
BUN BLD-MCNC: 21 MG/DL (ref 9–23)
BUN/CREAT SERPL: 21 (ref 10–20)
CALCIUM BLD-MCNC: 9.8 MG/DL (ref 8.7–10.4)
CHLORIDE SERPL-SCNC: 105 MMOL/L (ref 98–112)
CHOLEST SERPL-MCNC: 97 MG/DL (ref ?–200)
CO2 SERPL-SCNC: 29 MMOL/L (ref 21–32)
CREAT BLD-MCNC: 1 MG/DL (ref 0.7–1.3)
CRP SERPL-MCNC: <0.4 MG/DL (ref ?–1)
DEPRECATED HBV CORE AB SER IA-ACNC: 26 NG/ML (ref 50–336)
DEPRECATED RDW RBC AUTO: 41.1 FL (ref 35.1–46.3)
EGFRCR SERPLBLD CKD-EPI 2021: 74 ML/MIN/1.73M2 (ref 60–?)
EOSINOPHIL # BLD AUTO: 0.19 X10(3) UL (ref 0–0.7)
EOSINOPHIL NFR BLD AUTO: 2.7 %
ERYTHROCYTE [DISTWIDTH] IN BLOOD BY AUTOMATED COUNT: 12.6 % (ref 11–15)
EST. AVERAGE GLUCOSE BLD GHB EST-MCNC: 123 MG/DL (ref 68–126)
FASTING PATIENT LIPID ANSWER: YES
FASTING STATUS PATIENT QL REPORTED: YES
FOLATE SERPL-MCNC: 19.1 NG/ML (ref 5.4–?)
GLOBULIN PLAS-MCNC: 2.1 G/DL (ref 2–3.5)
GLUCOSE BLD-MCNC: 120 MG/DL (ref 70–99)
HBA1C MFR BLD: 5.9 % (ref ?–5.7)
HCT VFR BLD AUTO: 42 % (ref 39–53)
HDLC SERPL-MCNC: 31 MG/DL (ref 40–59)
HGB BLD-MCNC: 14.6 G/DL (ref 13–17.5)
IMM GRANULOCYTES # BLD AUTO: 0.01 X10(3) UL (ref 0–1)
IMM GRANULOCYTES NFR BLD: 0.1 %
IRON SATN MFR SERPL: 27 % (ref 20–50)
IRON SERPL-MCNC: 88 UG/DL (ref 65–175)
LDLC SERPL CALC-MCNC: 48 MG/DL (ref ?–100)
LIPASE SERPL-CCNC: 651 U/L (ref 12–53)
LYMPHOCYTES # BLD AUTO: 2.01 X10(3) UL (ref 1–4)
LYMPHOCYTES NFR BLD AUTO: 28.6 %
MCH RBC QN AUTO: 31.5 PG (ref 26–34)
MCHC RBC AUTO-ENTMCNC: 34.8 G/DL (ref 31–37)
MCV RBC AUTO: 90.5 FL (ref 80–100)
MONOCYTES # BLD AUTO: 0.56 X10(3) UL (ref 0.1–1)
MONOCYTES NFR BLD AUTO: 8 %
NEUTROPHILS # BLD AUTO: 4.24 X10 (3) UL (ref 1.5–7.7)
NEUTROPHILS # BLD AUTO: 4.24 X10(3) UL (ref 1.5–7.7)
NEUTROPHILS NFR BLD AUTO: 60.5 %
NONHDLC SERPL-MCNC: 66 MG/DL (ref ?–130)
OSMOLALITY SERPL CALC.SUM OF ELEC: 298 MOSM/KG (ref 275–295)
PLATELET # BLD AUTO: 159 10(3)UL (ref 150–450)
PLATELETS.RETICULATED NFR BLD AUTO: 8.3 % (ref 0–7)
POTASSIUM SERPL-SCNC: 3.8 MMOL/L (ref 3.5–5.1)
PROT SERPL-MCNC: 6.5 G/DL (ref 5.7–8.2)
RBC # BLD AUTO: 4.64 X10(6)UL (ref 3.8–5.8)
SODIUM SERPL-SCNC: 142 MMOL/L (ref 136–145)
TOTAL IRON BINDING CAPACITY: 328 UG/DL (ref 250–425)
TRANSFERRIN SERPL-MCNC: 250 MG/DL (ref 215–365)
TRIGL SERPL-MCNC: 91 MG/DL (ref 30–149)
TSI SER-ACNC: 2.05 UIU/ML (ref 0.55–4.78)
VIT B12 SERPL-MCNC: 739 PG/ML (ref 211–911)
VIT D+METAB SERPL-MCNC: 69.6 NG/ML (ref 30–100)
VLDLC SERPL CALC-MCNC: 13 MG/DL (ref 0–30)
WBC # BLD AUTO: 7 X10(3) UL (ref 4–11)

## 2025-06-02 PROCEDURE — 84443 ASSAY THYROID STIM HORMONE: CPT

## 2025-06-02 PROCEDURE — 85025 COMPLETE CBC W/AUTO DIFF WBC: CPT

## 2025-06-02 PROCEDURE — 82607 VITAMIN B-12: CPT

## 2025-06-02 PROCEDURE — 82728 ASSAY OF FERRITIN: CPT

## 2025-06-02 PROCEDURE — 86140 C-REACTIVE PROTEIN: CPT

## 2025-06-02 PROCEDURE — 82306 VITAMIN D 25 HYDROXY: CPT

## 2025-06-02 PROCEDURE — 36415 COLL VENOUS BLD VENIPUNCTURE: CPT

## 2025-06-02 PROCEDURE — 83036 HEMOGLOBIN GLYCOSYLATED A1C: CPT

## 2025-06-02 PROCEDURE — 83540 ASSAY OF IRON: CPT

## 2025-06-02 PROCEDURE — 80053 COMPREHEN METABOLIC PANEL: CPT

## 2025-06-02 PROCEDURE — 83690 ASSAY OF LIPASE: CPT

## 2025-06-02 PROCEDURE — 82746 ASSAY OF FOLIC ACID SERUM: CPT

## 2025-06-02 PROCEDURE — 84466 ASSAY OF TRANSFERRIN: CPT

## 2025-06-02 PROCEDURE — 80061 LIPID PANEL: CPT

## 2025-06-02 NOTE — TELEPHONE ENCOUNTER
Please notify the patient that the lipase pancreas level was elevated.  If he is having any abdominal pain, fevers, chills, nausea/vomiting he is to go to the emergency department for concern of pancreatitis.  However he has had a chronic incidence of this before, if he is feeling well monitor for symptoms accordingly.  And see me 6/5 as scheduled.  Will go over them together in detail

## 2025-06-02 NOTE — TELEPHONE ENCOUNTER
[facility] lab calling with critical lipase results. Lipase 651. Results in Epic.     To

## 2025-06-04 NOTE — H&P
HPI:   Kashif Suero is a 84 year old male who presents for a MA AHA (Medicare Advantage Annual Health Assessment) and Subsequent Annual Wellness visit (Pt already had Initial Annual Wellness).    Feeling decent. Left abdomen, all day everyday L sided pain. Ongoing pain.   No nausea nor vomiting. BOwels come out very solid, hurts to bring out x 15min     He takes a sleeping pill, 3-3.5 hours. Often urinary awakenings. Without the sleeping pill, he doesn't sleep at all. Eating at 2-3 am    Memory stable. No anxiety nor depression.    I reviewed and updated the PMHx, FamHx, medications, allergies, and SocHx as below with the patient    SocHX  - Home: Feels safe at home  - Work: Retired  - Hobbies: sees family, outings with Shanice  - Nutrition:    Breakfast - cookies, banana, water  Lunch - [employer] protein drink, cereal. Also for dinner  Yogurt.    No appetite. Really doesn't drink a lot of water.    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 Fall Prevention information provided to patient in After Visit Summary.    Do you feel unsteady when standing or walking?: No  Do you worry about falling?: No  Have you fallen in the past year?: Yes  How many times have you fallen?: 2  Were you injured?: No   Cognitive Assessment:   He had a completely normal cognitive assessment - see flowsheet entries     Functional Ability/Status:   Kashif Suero has some abnormal functions as listed below:  He has Driving difficulties based on screening of functional status. He has difficulties Managing Money/Bills based on screening of functional status.He has difficulties Shopping for Groceries based on screening of functional status. He has Hearing problems based on screening of functional status.He has problems with Memory based on  screening of functional status.       Depression Screening (PHQ-2/PHQ-9): Over the LAST 2 WEEKS   Little interest or pleasure in doing things: Not at all  Feeling down, depressed, or hopeless: Not at all  PHQ-2 SCORE: 0      Advanced Directive:  He does NOT have a Living Will. [Do you have a living will?: Yes]  He does NOT have a Power of  for Health Care. [Do you have a healthcare power of ?: Yes]    Tobacco:  He has never smoked tobacco.  Mr. Suero already takes aspirin and has it on his medication list.   CAGE Alcohol Screen:   CAGE screening score of 0 on 6/5/2025, showing low risk of alcohol abuse.       Patient Care Team: Patient Care Team:  Jose Angel Macias MD as PCP - General (Internal Medicine)  Isaac Lambert MD (Physical Medicine)    Problem List[1]  Wt Readings from Last 3 Encounters:   06/05/25 155 lb 12.8 oz (70.7 kg)   03/03/25 157 lb (71.2 kg)   02/27/25 156 lb 1.4 oz (70.8 kg)      Last Cholesterol Labs:   Lab Results   Component Value Date    CHOLEST 97 06/02/2025    HDL 31 (L) 06/02/2025    LDL 48 06/02/2025    TRIG 91 06/02/2025          Last Chemistry Labs:   Lab Results   Component Value Date    AST 28 06/02/2025    ALT 36 06/02/2025    CA 9.8 06/02/2025    ALB 4.4 06/02/2025    TSH 2.048 06/02/2025    CREATSERUM 1.00 06/02/2025     (H) 06/02/2025        CBC  (most recent labs)   Lab Results   Component Value Date    WBC 7.0 06/02/2025    HGB 14.6 06/02/2025    .0 06/02/2025        ALLERGIES:   He is allergic to ace inhibitors.    CURRENT MEDICATIONS:   Active Meds, Sig Only[2]   MEDICAL INFORMATION:   He  has a past medical history of Allergic rhinitis, Anxiety, Arthritis, Cataract of both eyes (2014), Cholelithiasis, Depression, Elevated PSA, Hemorrhoids, High triglycerides, IBS (irritable bowel syndrome), No diabetic retinopathy OU (2014), Osteoarthrosis, unspecified whether generalized or localized, unspecified site, Spinal stenosis, Torn tendon (2018),  Type II or unspecified type diabetes mellitus without mention of complication, not stated as uncontrolled, Unspecified essential hypertension (1988), and Vitreous floaters of both eyes (2014).    He  has a past surgical history that includes angioplasty (coronary) (01/01/2011); Cataract extraction w/  intraocular lens implant (Right, 03/16/2023); appendectomy (1970); back surgery (2005 ?); cataract (2023); and colonoscopy (2015 ?).    His family history includes Diabetes in his brother; Heart Attack (age of onset: 55) in his daughter; Heart Disease in his father; Pulmonary Disease in his brother; Stroke in his mother.   SOCIAL HISTORY:   He  reports that he has never smoked. He has never used smokeless tobacco. He reports that he does not currently use alcohol. He reports that he does not use drugs.     REVIEW OF SYSTEMS:   GENERAL: feels well otherwise  SKIN: denies any unusual skin lesions  EYES: denies blurred vision or double vision  HEENT: denies nasal congestion, sinus pain or ST  LUNGS: denies shortness of breath with exertion  CARDIOVASCULAR: denies chest pain on exertion  GI: +Chronic abdominal pain, denies heartburn  : 3 per night nocturia, no complaint of urinary incontinence  MUSCULOSKELETAL: denies back pain  NEURO: denies headaches  PSYCHE: denies depression or anxiety  HEMATOLOGIC: denies hx of anemia  ENDOCRINE: denies thyroid history  ALL/ASTHMA: denies hx of allergy or asthma    EXAM:   /50   Pulse (!) 45   Temp 97.8 °F (36.6 °C)   Ht 5' 7\" (1.702 m)   Wt 155 lb 12.8 oz (70.7 kg)   SpO2 98%   PF (!) 0 L/min   BMI 24.40 kg/m²   Estimated body mass index is 24.4 kg/m² as calculated from the following:    Height as of this encounter: 5' 7\" (1.702 m).    Weight as of this encounter: 155 lb 12.8 oz (70.7 kg).    Medicare Hearing Assessment  (Required for AWV/SWV)    Hearing Screening    Screening Method: Whisper Test  Whisper Test Result: Pass                Visual Acuity                            General Appearance:  Alert, cooperative, no distress, appears stated age   Head:  Normocephalic, without obvious abnormality, atraumatic   Eyes:  PERRL, conjunctiva/corneas clear, EOM's intact, both eyes   Ears:  Normal TM's and external ear canals, both ears   Nose: Nares normal, septum midline, mucosa normal, no drainage or sinus tenderness   Throat: Lips, mucosa, and tongue normal; teeth and gums normal   Neck: Supple, symmetrical, trachea midline, no adenopathy, thyroid: not enlarged, symmetric, no tenderness/mass/nodules, no carotid bruit or JVD   Back:   Symmetric, no curvature, ROM normal, no CVA tenderness   Lungs:   Clear to auscultation bilaterally, respirations unlabored   Chest Wall:  No tenderness or deformity   Heart:  Regular rate and rhythm, S1, S2 normal, no murmur, rub or gallop   Abdomen:   Soft, non-tender, bowel sounds active all four quadrants,  no masses, no organomegaly   Genitalia: Deferred   Rectal: Deferred   Extremities: Extremities normal, atraumatic, no cyanosis or edema   Pulses: 2+ and symmetric   Skin: Skin color, texture, turgor normal, no rashes or lesions   Lymph nodes: Cervical, supraclavicular, and axillary nodes normal   Neurologic: Normal, CN II through XII intact, 5 out of 5 muscle strength throughout, 2+ DTRs patellar tendons, normal gait          Vaccination History     Immunization History   Administered Date(s) Administered    Covid-19 Vaccine Moderna 100 mcg/0.5 ml 01/29/2021, 02/26/2021, 10/24/2021    Covid-19 Vaccine Moderna 50 Mcg/0.25 Ml 04/15/2022    Covid-19 Vaccine Moderna Bivalent 50mcg/0.5mL 12+ years 09/20/2022, 06/12/2023    FLU VAC High Dose 65 YRS & Older PRSV Free (94088) 09/03/2021, 09/12/2022, 10/25/2023    FLUAD High Dose 65 yr and older (23913) 09/02/2020    Fluzone Vaccine Medicare () 09/21/2015, 09/18/2016, 09/11/2017, 09/11/2018    High Dose Fluzone Influenza Vaccine, 65yr+ PF 0.5mL (95588) 09/10/2014, 09/05/2019, 09/13/2024    Moderna  Covid-19 Vaccine 50mcg/0.5ml 12yrs+ 10/04/2023, 09/13/2024    Pneumococcal (Prevnar 13) 10/06/2015    Pneumovax 23 12/14/2006, 09/23/2020    RSV, recombinant, RSVpreF, adjuvanted (Arexvy) 10/11/2023    TDAP 06/22/2017    Zoster Vaccine Recombinant Adjuvanted (Shingrix) 06/11/2019, 08/10/2019        ASSESSMENT AND OTHER RELEVANT CHRONIC CONDITIONS:   Kashif Suero is a 84 year old male who presents for a Medicare Assessment.       MRI abdomen and pelvis 10/8/2024    Impression   CONCLUSION:  1. Stable small right adrenal nodule, which is characterized as a benign adenoma.  2. Cholelithiasis.  3. Stable simple hepatic and renal cysts.  4. Lesser incidental findings as above.            MRI prostate 10/9/2024  Impression   CONCLUSION:  1. Limited MRI of the prostate gland due to technical difficulties with the IV contrast injection.    2. Two small lesions in the peripheral zone at the prostate apex, both measuring approximately 1.0 cm in size.  These include a lesion at 5:30 and a lesion at 7 o'clock.  PI-RADS CLASSIFICATION:  PI-RADS 4 - High (clinically significant cancer is likely  to be present).  Note that PI-RADS classification of these lesions would not change with IV contrast.    3. Additional approximately 1.0 cm sized lesion in the peripheral zone at 7 o'clock at the base of the prostate gland that is more indeterminate.  PI-RADS CLASSIFICATION:  PI-RADS 3 - Intermediate (the presence of clinically significant cancer is  equivocal).  Note this lesion could potentially be upgraded to a PI-RADS 4 lesion if IV contrast demonstrated suspicious enhancement characteristics.  4. No evidence of extracapsular extension of tumor, pelvic lymphadenopathy or osseous metastases.      5. Prostate gland is moderately enlarged and demonstrates benign prostatic hypertrophy.      X-ray right shoulder 10/17/2024    Impression   CONCLUSION:  1. Primary osteoarthritic changes of the right shoulder are apparent.     2. No  radiographically visible acute osseous injury of the right shoulder.     3. Radiographic findings which could be indicative of underlying calcific tendinitis of the right supraspinatus tendon.     4. Lateral acromial downsloping could predispose to impingement syndrome in the appropriate clinical setting.       Greta scan 3/17/2025    Impression  CONCLUSION:  1. Regadenoson (Lexiscan) perfusion imaging study demonstrating a moderately reversible, moderate-sized perfusion defect of the inferolateral wall. In the appropriate clinical setting, this may constitute evidence of reversible stress-induced ischemia.     2. Preserved normal left ventricular ejection fraction of 72%.     3. There is no wall motion abnormality.    Arterial Doppler 5/23/2025    Impression - [id number]  The study quality is average.    Impression - NF-[id number]-SW  Right mid popliteal artery with 50-75% stenosis.   Impression - NH-IVDRPEXN-DU  Right proximal anterior tibial artery with 50-75% stenosis.   Impression - BB-[id number]-YF  Bilateral scattered plaque causing less than 50% stenosis through remaining lower extremity arteries.   Impression - ZC-ULHOGGVD-UN  Bilateral lower extremity arterial system demonstrates bi-phasic waveforms.   Impression - LT-XAHHQXRZ-ZV  Three vessel runoff to the feet bilaterally.   Impression - BZ-NGHSEEKU-FI  Calcific plaque with shadowing limits visualization. Significant stenosis and/or occlusions can not be ruled out in these areas of shadowing.         Carotid Doppler 5/23/2025  Impression - CAROTID-US  The study quality is good.    Impression - CAROTID-US  Intimal thickening in left distal common carotid artery measuring 1.05 mm.   Impression - CAROTID-US  Bilateral mild carotid bifurcation atherosclerosis.   Impression - CAROTID-US  1-39% stenosis in the proximal right internal carotid artery based on Bluth Criteria.    Impression - CAROTID-US  1-39% stenosis in the proximal left internal carotid artery  based on Bluth Criteria.    Impression - CAROTID-US  Antegrade right vertebral artery flow.    Impression - CAROTID-US  Antegrade left vertebral artery flow.        Echo 5/23/2025    Impression - TTE  The left ventricle is normal in size. The left ventricular wall thickness is normal. Global left ventricular systolic function is normal. The left ventricular ejection fraction is 70%. No regional wall motion abnormalities. The left ventricle diastolic function is impaired (Grade II) with an elevated left atrial pressure.   Impression - TTE  Mildly calcified mitral leaflets. Possible posterior leaflet prolapse. At least moderate and possibly severe mitral regurgitation, directed eccentrically and anteriorly.   Impression - TTE  The left atrium is mildly dilated.     PLAN SUMMARY:   Diagnoses and all orders for this visit:    Screening for colon cancer    Abnormal serum level of lipase  -     GASTRO - INTERNAL; Future  -     Lipase [E]; Future  -     Comp Metabolic Panel (14) [E]; Future  -     Pancreatic elastase, fecal; Future  -     CT ABDOMEN+PELVIS(CONTRAST ONLY)(CPT=74177); Future    Acute pancreatitis, unspecified complication status, unspecified pancreatitis type (HCC)  -     Comp Metabolic Panel (14) [E]; Future  -     CT ABDOMEN+PELVIS(CONTRAST ONLY)(CPT=74177); Future    Type 2 diabetes mellitus without complication, without long-term current use of insulin (HCC)    Memory problem    Stage 3a chronic kidney disease (HCC)    Chronic abdominal pain  -     GASTRO - INTERNAL; Future  -     Lipase [E]; Future  -     Comp Metabolic Panel (14) [E]; Future  -     Pancreatic elastase, fecal; Future  -     CT ABDOMEN+PELVIS(CONTRAST ONLY)(CPT=74177); Future    Loss of appetite  -     GASTRO - INTERNAL; Future  -     Lipase [E]; Future  -     Comp Metabolic Panel (14) [E]; Future  -     Pancreatic elastase, fecal; Future    Unintentional weight loss  -     GASTRO - INTERNAL; Future  -     Lipase [E]; Future  -     Comp  Metabolic Panel (14) [E]; Future  -     Pancreatic elastase, fecal; Future  -     CT ABDOMEN+PELVIS(CONTRAST ONLY)(CPT=74177); Future    Anemia, unspecified type    ASHD (arteriosclerotic heart disease)    Exocrine pancreatic insufficiency (HCC)  -     GASTRO - INTERNAL; Future  -     Lipase [E]; Future  -     Pancreatic elastase, fecal; Future    Encounter for annual health examination    Presbycusis of both ears  -     ENT Referral - In Network           Weight loss with anorexia  Patient has been losing weight over time.  Patient is lost 6 pounds since he was here in March.  In the past patient weighed around 200 to 205 pounds.  Patient is now down to 157 pounds.  - On Dr. Mendiola's initial evaluation, he is concerned about occult malignancy given significant weight loss unintentionally, significant anemia, complaints of abdominal pain, elevated PSA  - Noted multiple nutritional deficiencies and iron deficiency, folic acid deficiency, borderline vitamin B12 deficiency  - MRI of the prostate did show lesions PI-RADS 4 in the peripheral zone of the prostate apex, PI-RADS 3 the limited without use of IV contrast.  Clinically significant cancer likely to be present.  He did have follow-up with urology 10/22 for which Kerri electing for observation and surveillance PSA every 6 months.  -Recall that colonoscopy was being arranged as part of our workup.  However, he would like to hold off on this  - Question of pancreatic insufficiency, his lipase was elevated on the last check however remains asymptomatic..  As this is a clinical change from lipase 55 3 months ago, I would like to proceed with a stat CT abdomen and pelvis to rule out acute pancreatitis versus chronic  - We will also proceed with fecal elastase for possible exocrine pancreatic insufficiency  - May benefit from evaluation with Dr. Ila LOMBARDI (arteriosclerotic heart disease)  History of PCI of LAD and RCA more than 10 years ago.  Recall that  about 1 year ago, ischemia workup with stress test was recommended, but patient had deferred up until recently.  - On aspirin 81 mg daily, control of cholesterol as below  - Nuclear stress test 3/17/2025 moderately reversible moderate size perfusion defect of inferior lateral wall which could constitute evidence of reversible stress-induced ischemia, EF 72%.  No wall motion abnormality  - 2D echo 5/23/2025: Ejection fraction 70%, grade 2 diastolic function  - Did see Dr. Wade 4/30/2025 however Kashif again declined any further intervention including cardiac catheterization.  Echocardiogram was recommended.  Arterial duplex ultrasound recommended.        Type 2 diabetes mellitus without complication, without long-term current use of insulin ([facility])  Recent A1c:   HgbA1C (%)   Date Value   06/02/2025 5.9 (H)     Recent urine microalbumin: No components found for: \"URINEMICROALBUMIN\"  Current medications: 1000 mg metformin twice a day  Eye exam: Due for diabetic eye exam  Foot exam: Check feet daily  - A1c remains controlled.  Will decrease metformin to once a day as he is having spikes in the 180-270.     Hypertension  -Blood pressure today at goal  - Check blood pressures at home  - Continue with home amlodipine 10 mg daily, losartan 100 mg daily, metoprolol succinate 25 mg daily      Hyperlipidemia  -Last lipid panel remains at goal  - Repeat fasting lipid panel at next visit  - Continue with atorvastatin 20 mg daily      Mitral valve insufficiency, unspecified etiology  Aortic valve stenosis, etiology of cardiac valve disease unspecified  -Being monitored by cardiology, Dr. Wade, last TTE with moderate mitral regurgitation but normal left ventricular ejection fraction and chamber size.  Plan to repeat TTE annually  -2D echo which showed trace aortic regurgitation, peak velocity 2.0 m/s, peak gradient 17 mmHg, VTI 46.2     Peripheral arterial disease  - Demonstrated on arterial duplex 5/23/2025 through [facility] with  notable right mid popliteal artery 50-75%, right proximal anterior tibial artery 50-75%, bilateral scattered plaques of the lower extremities less than 50%.  Limited by shadowing effects  - Carotid Doppler 5/23: 1-39% stenosis bilateral ICA  - Ongoing management of hyperlipidemia and coronary artery disease as above    Peripheral polyneuropathy  -Consider effects of diabetic polyneuropathy.  Should also consider vitamin B12 relative deficiency as we are on the lower end of normal  - We will continue glycemic control, vitamin B12 supplementation as below     Gastroesophageal reflux disease, unspecified whether esophagitis present  Irritable bowel syndrome without diarrhea  -On famotidine 20 mg twice a day  - H. pylori breath test was negative  - MRI abdomen otherwise without significant abnormalities     Stage 3a chronic kidney disease (HCC)  Patient's recent creatinine   -Will monitor closely  - Continue with adequate water intake     Anemia, with iron deficiency  Thrombocytopenia  -Hemoglobin 13.9, folate repleted, vitamin B12 repleted.  - Folic acid levels at goal.  Okay to discontinue  - Vitamin B12 now repleted.  Would recommend discontinuing and following closely.  - Ferritin levels remain low, but his anemia has improved.  Would continue ferrous sulfate 325 mg daily.  Monitor for constipation  -We will monitor CBCs     Prostate nodule  BPH with LUTS  Patient has a prostate nodule involving the left lobe of his prostate which is suspicious for prostate cancer.  Patient will have a diagnostic PSA.  -PSA elevated 7.50  - For concern of prostate cancer, does require imaging ideally with CT scan  - Established with urology, Dr. Barclay most recently seen 4/28/2025 follow-up on an as-needed basis for symptoms.     Spinal stenosis of lumbar region, unspecified whether neurogenic claudication   Chronic bilateral low back pain without sciatica  Patient complains of much pain in his low back region.  He also has some  pain in the upper back region.  -Acetaminophen 500-650 mg every 4-6 hours as needed for pain relief  - Should avoid NSAIDs given history of chronic kidney disease  - On duloxetine 30 mg daily  - Notify us if pain symptoms worsen over time     Memory changes  - May be age-related, however there are likely some contribution with vitamin B12 deficiency  - We will proceed with vitamin B12 supplementation, 1000 mcg once a day  - Vitamin B12 has now been repleted       Shanice Rowland and I, discussed his ongoing symptoms.  Recall, he is adamant on not having any invasive procedures.  I discussed that angiogram was recommended by cardiology and I agree with due to reversible blockage concerning for need for possible stent.  This can also lead into further evaluation as multiple occasions, we had discussed concern for cancer especially without a recent colonoscopy.  He does not want any further invasive procedures as well knowing this as a potential risk.  There may be some findings on endoscopy that we would not find on imaging or labs.  Acknowledges this, but he is amenable to noninvasive procedures.  CT scan today.  Will try to workup for pancreatic exocrine insufficiency.    Medically complex case due to active symptoms, need for stat CT, ongoing chronic management with new acute findings of elevated lipase.    HTN Screen: BP at goal  DM Screen: As above  HLD Screen: As above  HCV Screen: Considered low risk  HIV Screen: considered low risk  G/C/Syphilis: Considered low risk    Colon Cancer Screening (45-70): Symptomatic workup as above for screening purposes, no longer indicated  Prostate Cancer Screening: (50-70): Per urology evaluation  Lung Cancer Screening (55-79 with 30 p/year and active < 15 years): Not indicated  AAA Screening (65-75 Hx of smoking): Not indicated    Influenza: Annually   Td/Tdap: Last Tdap 2017  Zoster (50+): Up-to-date  HPV (19-26): Not indicated  Pneumococcal: Up-to-date    Immunization History    Administered Date(s) Administered    Covid-19 Vaccine Moderna 100 mcg/0.5 ml 01/29/2021, 02/26/2021, 10/24/2021    Covid-19 Vaccine Moderna 50 Mcg/0.25 Ml 04/15/2022    Covid-19 Vaccine Moderna Bivalent 50mcg/0.5mL 12+ years 09/20/2022, 06/12/2023    FLU VAC High Dose 65 YRS & Older PRSV Free ([id number]) 09/03/2021, 09/12/2022, 10/25/2023    FLUAD High Dose 65 yr and older (46662) 09/02/2020    Fluzone Vaccine Medicare () 09/21/2015, 09/18/2016, 09/11/2017, 09/11/2018    High Dose Fluzone Influenza Vaccine, 65yr+ PF 0.5mL (55470) 09/10/2014, 09/05/2019, 09/13/2024    Moderna Covid-19 Vaccine 50mcg/0.5ml 12yrs+ 10/04/2023, 09/13/2024    Pneumococcal (Prevnar 13) 10/06/2015    Pneumovax 23 12/14/2006, 09/23/2020    RSV, recombinant, RSVpreF, adjuvanted (Arexvy) 10/11/2023    TDAP 06/22/2017    Zoster Vaccine Recombinant Adjuvanted (Shingrix) 06/11/2019, 08/10/2019           Diet assessment: good     PLAN:  The patient indicates understanding of these issues and agrees to the plan.  Reinforced healthy diet, lifestyle, and exercise.    No follow-ups on file.     Jose Angel Macias MD, 6/4/2025     General Health     In the past six months, have you lost more than 10 pounds without trying?: 2 - No  Has your appetite been poor?: Yes  How does the patient maintain a good energy level?: Other  How would you describe your daily physical activity?: Light  How would you describe your current health state?: Fair  How do you maintain positive mental well-being?: Social Interaction, Visiting Family     Supplementary Documentation:   Kashif Suero's SCREENING SCHEDULE   Tests on this list are recommended by your physician but may not be covered, or covered at this frequency, by your insurer.   Please check with your insurance carrier before scheduling to verify coverage.   PREVENTATIVE SERVICES FREQUENCY &  COVERAGE DETAILS LAST COMPLETION DATE   Diabetes Screening    Fasting Blood Sugar / Glucose    One screening every 12 months  if never tested or if previously tested but not diagnosed with pre-diabetes   One screening every 6 months if diagnosed with pre-diabetes Lab Results   Component Value Date     (H) 06/02/2025        Cardiovascular Disease Screening    Lipid Panel  Cholesterol  Lipoprotein (HDL)  Triglycerides Covered every 5 years for all Medicare beneficiaries without apparent signs or symptoms of cardiovascular disease Lab Results   Component Value Date    CHOLEST 97 06/02/2025    HDL 31 (L) 06/02/2025    LDL 48 06/02/2025    TRIG 91 06/02/2025         Electrocardiogram (EKG)   Covered if needed at Welcome to Medicare, and non-screening if indicated for medical reasons 06/19/2024      Ultrasound Screening for Abdominal Aortic Aneurysm (AAA) Covered once in a lifetime for one of the following risk factors    Men who are 65-75 years old and have ever smoked    Anyone with a family history -     Colorectal Cancer Screening  Covered for ages 50-85; only need ONE of the following:    Colonoscopy   Covered every 10 years    Covered every 2 years if patient is at high risk or previous colonoscopy was abnormal -    No recommendations at this time    Flexible Sigmoidoscopy   Covered every 4 years -    Fecal Occult Blood Test Covered annually 06/19/2024   Prostate Cancer Screening    Prostate-Specific Antigen (PSA) Annually Lab Results   Component Value Date    PSA 7.50 (H) 06/19/2024     Health Maintenance   Topic Date Due    PSA  06/19/2025      Immunizations    Influenza Covered once per flu season  Please get every year 09/13/2024  No recommendations at this time    Pneumococcal Each vaccine ([id number] & [id number]) covered once after 65 Prevnar 13: 10/06/2015    Lerguzlns13: 09/23/2020     No recommendations at this time    Hepatitis B One screening covered for patients with certain risk factors   -  No recommendations at this time    Tetanus Toxoid Not covered by Medicare Part B unless medically necessary (cut with metal); may  be covered with your pharmacy prescription benefits -    Tetanus, Diptheria and Pertusis TD and TDaP Not covered by Medicare Part B -  No recommendations at this time    Zoster Not covered by Medicare Part B; may be covered with your pharmacy  prescription benefits -  No recommendations at this time     Diabetes      Hemoglobin A1C Annually; if last result is elevated, may be asked to retest more frequently.    Medicare covers every 3 months Lab Results   Component Value Date     06/02/2025    A1C 5.9 (H) 06/02/2025       No recommendations at this time    Creat/alb ratio Annually Lab Results   Component Value Date    MICROALBCREA 9.1 12/02/2024       LDL Annually Lab Results   Component Value Date    LDL 48 06/02/2025       Dilated Eye Exam Annually Last Diabetic Eye Exam:  Last Dilated Eye Exam: 04/22/25  Eye Exam shows Diabetic Retinopathy?: No       Annual Monitoring of Persistent Medications (ACE/ARB, digoxin diuretics, anticonvulsants)    Potassium Annually Lab Results   Component Value Date    K 3.8 06/02/2025         Creatinine   Annually Lab Results   Component Value Date    CREATSERUM 1.00 06/02/2025         BUN Annually Lab Results   Component Value Date    BUN 21 06/02/2025       Drug Serum Conc Annually No results found for: \"DIGOXIN\", \"DIG\", \"VALP\"                   [1]   Patient Active Problem List  Diagnosis    Essential hypertension    Type 2 diabetes mellitus without complication, without long-term current use of insulin (HCC)    Hypercholesterolemia    Hypertriglyceridemia    Fatigue    Osteoarthritis    Hereditary and idiopathic peripheral neuropathy    Leg cramps    Elevated PSA    Gastroesophageal reflux disease    Irritable bowel syndrome without diarrhea    Benign non-nodular prostatic hyperplasia with lower urinary tract symptoms    Diabetes mellitus type 2 without retinopathy (HCC)    Age-related nuclear cataract of both eyes    Floater, vitreous    Abnormal EKG    Mitral valve  insufficiency    Cyst of skin    MUSE (dyspnea on exertion)    Stage 3 chronic kidney disease (HCC)    Primary osteoarthritis involving multiple joints    Postlaminectomy syndrome of lumbar region    ASHD (arteriosclerotic heart disease)    Peripheral polyneuropathy    Weakness of both lower extremities    Spinal stenosis of lumbar region    Stage 3a chronic kidney disease (HCC)    Memory problem    Thrombocytopenia    Aortic valve stenosis   [2]   Outpatient Medications Marked as Taking for the 6/5/25 encounter (Office Visit) with Jose Agnel Macias MD   Medication Sig    CELECOXIB 200 MG Oral Cap TAKE 1 CAPSULE DAILY    losartan 100 MG Oral Tab Take 1 tablet (100 mg total) by mouth daily.    metoprolol succinate ER 25 MG Oral Tablet 24 Hr Take 1 tablet (25 mg total) by mouth daily.    donepezil (ARICEPT) 10 MG Oral Tab Take 1 tablet (10 mg total) by mouth nightly.    famotidine 20 MG Oral Tab Take 1 tablet (20 mg total) by mouth 2 (two) times daily.    tamsulosin 0.4 MG Oral Cap Take 1 capsule (0.4 mg total) by mouth daily.    ZOLPIDEM 10 MG Oral Tab TAKE 1 TABLET BY MOUTH NIGHTLY AS NEEDED FOR SLEEP    MEMANTINE 5 MG Oral Tab TAKE 1 TABLET DAILY    dutasteride 0.5 MG Oral Cap Take 1 capsule (0.5 mg total) by mouth daily.    DULoxetine 30 MG Oral Cap DR Particles Take 1 capsule (30 mg total) by mouth daily.    atorvastatin 20 MG Oral Tab Take 1 tablet (20 mg total) by mouth daily.    cyanocobalamin 1000 MCG Oral Tab Take 2 tablets (2,000 mcg total) by mouth daily.    amLODIPine 10 MG Oral Tab Take 1 tablet (10 mg total) by mouth daily.    ipratropium 0.06 % Nasal Solution 2 sprays by Nasal route 4 (four) times daily.    metFORMIN HCl 1000 MG Oral Tab Take 1 tablet (1,000 mg total) by mouth 2 (two) times daily with meals. (Patient taking differently: Take 1 tablet (1,000 mg total) by mouth daily with breakfast.)    Cholecalciferol (VITAMIN D3) 25 MCG (1000 UT) Oral Cap Take 1 tablet by mouth daily. 2000 units daily     LORAZEPAM 0.5 MG Oral Tab TAKE 1 TABLET BY MOUTH TWICE A DAY AS NEEDED    Coenzyme Q10 (CO Q-10) 200 MG Oral Cap Take 1 tablet by mouth daily.    aspirin 81 MG Oral Tab EC Take 1 tablet (81 mg total) by mouth daily.

## 2025-06-04 NOTE — PATIENT INSTRUCTIONS
You are seen in clinic today for follow-up.      We reviewed your most recent set of blood work and there was a significant elevation in one of the pancreas blood test.  Monitor for any abdominal pain, nausea, vomiting as acute pancreatitis can be a medical emergency  - Maintain adequate water intake  - Keep a low-fat food plan in place  - We are arranging for CT scan today to evaluate this further  - It is good that you are not having any active symptoms that needs immediate ER evaluation  - As long as the CT scan is clear, we should also proceed with a stool test to rule out exocrine pancreatic insufficiency which can cause chronic weight loss and gastrointestinal symptoms.    Cardiac workup showed abnormal perfusion, but the repeat scans with Dr. Wade looks good  - Gastroenterology workup with colonoscopy is recommended.  However this is contingent on cardiac angiogram.  We should consider pursuing these measures given the ongoing symptoms    Today, we did review your last set of workup with Dr. Barclay  -Given risks for potential procedures, treatments, we are monitoring with urology on prostate levels and examinations  - Ideally would pursue colonoscopy, but as long as weight remains stable we can continue to observe for now      We need adequate nutrition, calorie intake is most important.  No specific restrictions on the diet  - Ideally we have increase in protein, good fiber intake to modulate the bowel movements.    Keep up with good water intake for the kidneys.  Staying hydrated can help aid in blood sugar checks    You may benefit from evaluation with ENT, Dr. Story with the concern for worsening hearing.      Return to clinic in 3 months for follow-up

## 2025-06-05 ENCOUNTER — TELEPHONE (OUTPATIENT)
Dept: INTERNAL MEDICINE CLINIC | Facility: CLINIC | Age: 85
End: 2025-06-05

## 2025-06-05 ENCOUNTER — HOSPITAL ENCOUNTER (OUTPATIENT)
Dept: CT IMAGING | Facility: HOSPITAL | Age: 85
Discharge: HOME | End: 2025-06-05
Attending: INTERNAL MEDICINE
Payer: MEDICARE

## 2025-06-05 ENCOUNTER — OFFICE VISIT (OUTPATIENT)
Dept: INTERNAL MEDICINE CLINIC | Facility: CLINIC | Age: 85
End: 2025-06-05

## 2025-06-05 ENCOUNTER — LAB ENCOUNTER (OUTPATIENT)
Dept: LAB | Facility: HOSPITAL | Age: 85
End: 2025-06-05
Attending: INTERNAL MEDICINE
Payer: MEDICARE

## 2025-06-05 VITALS
DIASTOLIC BLOOD PRESSURE: 50 MMHG | OXYGEN SATURATION: 98 % | HEART RATE: 45 BPM | HEIGHT: 67 IN | SYSTOLIC BLOOD PRESSURE: 116 MMHG | BODY MASS INDEX: 24.45 KG/M2 | WEIGHT: 155.81 LBS | TEMPERATURE: 98 F

## 2025-06-05 DIAGNOSIS — H91.13 PRESBYCUSIS OF BOTH EARS: ICD-10-CM

## 2025-06-05 DIAGNOSIS — N18.31 STAGE 3A CHRONIC KIDNEY DISEASE (HCC): ICD-10-CM

## 2025-06-05 DIAGNOSIS — R74.8 ABNORMAL SERUM LEVEL OF LIPASE: ICD-10-CM

## 2025-06-05 DIAGNOSIS — R10.9 CHRONIC ABDOMINAL PAIN: ICD-10-CM

## 2025-06-05 DIAGNOSIS — K85.90 ACUTE PANCREATITIS, UNSPECIFIED COMPLICATION STATUS, UNSPECIFIED PANCREATITIS TYPE (HCC): ICD-10-CM

## 2025-06-05 DIAGNOSIS — Z12.11 SCREENING FOR COLON CANCER: Primary | ICD-10-CM

## 2025-06-05 DIAGNOSIS — R63.4 UNINTENTIONAL WEIGHT LOSS: ICD-10-CM

## 2025-06-05 DIAGNOSIS — G89.29 CHRONIC ABDOMINAL PAIN: ICD-10-CM

## 2025-06-05 DIAGNOSIS — K86.81 EXOCRINE PANCREATIC INSUFFICIENCY (HCC): ICD-10-CM

## 2025-06-05 DIAGNOSIS — Z00.00 ENCOUNTER FOR ANNUAL HEALTH EXAMINATION: ICD-10-CM

## 2025-06-05 DIAGNOSIS — E11.9 TYPE 2 DIABETES MELLITUS WITHOUT COMPLICATION, WITHOUT LONG-TERM CURRENT USE OF INSULIN (HCC): ICD-10-CM

## 2025-06-05 DIAGNOSIS — D64.9 ANEMIA, UNSPECIFIED TYPE: ICD-10-CM

## 2025-06-05 DIAGNOSIS — I25.10 ASHD (ARTERIOSCLEROTIC HEART DISEASE): ICD-10-CM

## 2025-06-05 DIAGNOSIS — R63.0 LOSS OF APPETITE: ICD-10-CM

## 2025-06-05 DIAGNOSIS — R41.3 MEMORY PROBLEM: ICD-10-CM

## 2025-06-05 LAB
ALBUMIN SERPL-MCNC: 4.7 G/DL (ref 3.2–4.8)
ALBUMIN/GLOB SERPL: 2.2 {RATIO} (ref 1–2)
ALP LIVER SERPL-CCNC: 97 U/L (ref 45–117)
ALT SERPL-CCNC: 51 U/L (ref 10–49)
ANION GAP SERPL CALC-SCNC: 8 MMOL/L (ref 0–18)
AST SERPL-CCNC: 57 U/L (ref ?–34)
BILIRUB SERPL-MCNC: 0.7 MG/DL (ref 0.2–1.1)
BUN BLD-MCNC: 21 MG/DL (ref 9–23)
BUN/CREAT SERPL: 21.4 (ref 10–20)
CALCIUM BLD-MCNC: 9.5 MG/DL (ref 8.7–10.4)
CHLORIDE SERPL-SCNC: 101 MMOL/L (ref 98–112)
CO2 SERPL-SCNC: 26 MMOL/L (ref 21–32)
CREAT BLD-MCNC: 0.98 MG/DL (ref 0.7–1.3)
EGFRCR SERPLBLD CKD-EPI 2021: 76 ML/MIN/1.73M2 (ref 60–?)
FASTING STATUS PATIENT QL REPORTED: YES
GLOBULIN PLAS-MCNC: 2.1 G/DL (ref 2–3.5)
GLUCOSE BLD-MCNC: 107 MG/DL (ref 70–99)
LIPASE SERPL-CCNC: 381 U/L (ref 12–53)
OSMOLALITY SERPL CALC.SUM OF ELEC: 283 MOSM/KG (ref 275–295)
POTASSIUM SERPL-SCNC: 3.7 MMOL/L (ref 3.5–5.1)
PROT SERPL-MCNC: 6.8 G/DL (ref 5.7–8.2)
SODIUM SERPL-SCNC: 135 MMOL/L (ref 136–145)

## 2025-06-05 PROCEDURE — 83690 ASSAY OF LIPASE: CPT

## 2025-06-05 PROCEDURE — 36415 COLL VENOUS BLD VENIPUNCTURE: CPT

## 2025-06-05 PROCEDURE — 74177 CT ABD & PELVIS W/CONTRAST: CPT | Performed by: INTERNAL MEDICINE

## 2025-06-05 PROCEDURE — 80053 COMPREHEN METABOLIC PANEL: CPT

## 2025-06-05 NOTE — TELEPHONE ENCOUNTER
Called and spoke with patient daughter  as she answered patients telephone number and identified full name and date of birth.  ARLENE verified  Relayed Dr. Macias message and patient voiced understanding with treatment plan

## 2025-06-05 NOTE — TELEPHONE ENCOUNTER
Patient called and relayed Dr Macias' message. Patient verbalized understanding with no further questions noted.

## 2025-06-05 NOTE — TELEPHONE ENCOUNTER
Please call patient spouse with results of tests taken today at hospital  Please discuss with Leta only  Leta is leaving at 3:00 will be out until 8:00PM   Please call tomorrow if after 3:00 today  Tasked to nursing

## 2025-06-05 NOTE — TELEPHONE ENCOUNTER
Please notify patient or patient's wife Leta that the lipase is getting better.  Low suspicion for acute pancreatitis, but lets proceed with good water intake as we discussed.

## 2025-06-05 NOTE — TELEPHONE ENCOUNTER
Please notify patient or patient's wife Leta, there are kidney stones right greater than left for which radiology is recommending input from urology, Dr. Barclay.  Monitor for any bloody urination, but Kashif needs to push lots of fluids, and monitor the urine to see if the stone passes.    He does have constipation.  Noted on exam, continue with stool softeners and fiber as we discussed.    Thankfully, the pancreas looks normal.  In the absence of symptoms and this reassuring test, low suspicion for pancreatitis at this time.  However we will follow-up on the labs that we discussed.

## 2025-06-07 ENCOUNTER — HOSPITAL ENCOUNTER (OUTPATIENT)
Facility: HOSPITAL | Age: 85
Setting detail: OBSERVATION
Discharge: HOME | End: 2025-06-08
Attending: INTERNAL MEDICINE | Admitting: INTERNAL MEDICINE
Payer: MEDICARE

## 2025-06-07 ENCOUNTER — APPOINTMENT (OUTPATIENT)
Dept: ULTRASOUND IMAGING | Facility: HOSPITAL | Age: 85
End: 2025-06-07
Attending: NURSE PRACTITIONER
Payer: MEDICARE

## 2025-06-07 ENCOUNTER — APPOINTMENT (OUTPATIENT)
Dept: CT IMAGING | Facility: HOSPITAL | Age: 85
End: 2025-06-07
Attending: NURSE PRACTITIONER
Payer: MEDICARE

## 2025-06-07 DIAGNOSIS — N23 URETERAL COLIC: Primary | ICD-10-CM

## 2025-06-07 LAB
ALBUMIN SERPL-MCNC: 4.4 G/DL (ref 3.2–4.8)
ALBUMIN/GLOB SERPL: 2.1 {RATIO} (ref 1–2)
ALP LIVER SERPL-CCNC: 94 U/L (ref 45–117)
ALT SERPL-CCNC: 36 U/L (ref 10–49)
ANION GAP SERPL CALC-SCNC: 6 MMOL/L (ref 0–18)
AST SERPL-CCNC: 29 U/L (ref ?–34)
BASOPHILS # BLD AUTO: 0.02 X10(3) UL (ref 0–0.2)
BASOPHILS NFR BLD AUTO: 0.2 %
BILIRUB SERPL-MCNC: 0.7 MG/DL (ref 0.2–1.1)
BILIRUB UR QL: NEGATIVE
BUN BLD-MCNC: 27 MG/DL (ref 9–23)
BUN/CREAT SERPL: 23.3 (ref 10–20)
CALCIUM BLD-MCNC: 9.6 MG/DL (ref 8.7–10.4)
CHLORIDE SERPL-SCNC: 106 MMOL/L (ref 98–112)
CLARITY UR: CLEAR
CO2 SERPL-SCNC: 25 MMOL/L (ref 21–32)
CREAT BLD-MCNC: 1.16 MG/DL (ref 0.7–1.3)
DEPRECATED RDW RBC AUTO: 40.8 FL (ref 35.1–46.3)
EGFRCR SERPLBLD CKD-EPI 2021: 62 ML/MIN/1.73M2 (ref 60–?)
EOSINOPHIL # BLD AUTO: 0.19 X10(3) UL (ref 0–0.7)
EOSINOPHIL NFR BLD AUTO: 2 %
ERYTHROCYTE [DISTWIDTH] IN BLOOD BY AUTOMATED COUNT: 12.7 % (ref 11–15)
GLOBULIN PLAS-MCNC: 2.1 G/DL (ref 2–3.5)
GLUCOSE BLD-MCNC: 122 MG/DL (ref 70–99)
GLUCOSE UR-MCNC: NORMAL MG/DL
HCT VFR BLD AUTO: 39.6 % (ref 39–53)
HGB BLD-MCNC: 14 G/DL (ref 13–17.5)
IMM GRANULOCYTES # BLD AUTO: 0.03 X10(3) UL (ref 0–1)
IMM GRANULOCYTES NFR BLD: 0.3 %
KETONES UR-MCNC: NEGATIVE MG/DL
LEUKOCYTE ESTERASE UR QL STRIP.AUTO: NEGATIVE
LIPASE SERPL-CCNC: 48 U/L (ref 12–53)
LYMPHOCYTES # BLD AUTO: 2.21 X10(3) UL (ref 1–4)
LYMPHOCYTES NFR BLD AUTO: 23.5 %
MCH RBC QN AUTO: 31.1 PG (ref 26–34)
MCHC RBC AUTO-ENTMCNC: 35.4 G/DL (ref 31–37)
MCV RBC AUTO: 88 FL (ref 80–100)
MONOCYTES # BLD AUTO: 0.72 X10(3) UL (ref 0.1–1)
MONOCYTES NFR BLD AUTO: 7.7 %
NEUTROPHILS # BLD AUTO: 6.24 X10 (3) UL (ref 1.5–7.7)
NEUTROPHILS # BLD AUTO: 6.24 X10(3) UL (ref 1.5–7.7)
NEUTROPHILS NFR BLD AUTO: 66.3 %
NITRITE UR QL STRIP.AUTO: NEGATIVE
OSMOLALITY SERPL CALC.SUM OF ELEC: 290 MOSM/KG (ref 275–295)
PH UR: 6 [PH] (ref 5–8)
PLATELET # BLD AUTO: 174 10(3)UL (ref 150–450)
POTASSIUM SERPL-SCNC: 4.2 MMOL/L (ref 3.5–5.1)
PROT SERPL-MCNC: 6.5 G/DL (ref 5.7–8.2)
PROT UR-MCNC: NEGATIVE MG/DL
RBC # BLD AUTO: 4.5 X10(6)UL (ref 3.8–5.8)
RBC #/AREA URNS AUTO: >10 /HPF
SODIUM SERPL-SCNC: 137 MMOL/L (ref 136–145)
SP GR UR STRIP: 1.02 (ref 1–1.03)
UROBILINOGEN UR STRIP-ACNC: NORMAL
WBC # BLD AUTO: 9.4 X10(3) UL (ref 4–11)

## 2025-06-07 PROCEDURE — 76705 ECHO EXAM OF ABDOMEN: CPT | Performed by: NURSE PRACTITIONER

## 2025-06-07 PROCEDURE — 74176 CT ABD & PELVIS W/O CONTRAST: CPT | Performed by: NURSE PRACTITIONER

## 2025-06-07 RX ORDER — TRAMADOL HYDROCHLORIDE 50 MG/1
50 TABLET ORAL ONCE
Status: COMPLETED | OUTPATIENT
Start: 2025-06-07 | End: 2025-06-08

## 2025-06-08 ENCOUNTER — TELEPHONE (OUTPATIENT)
Dept: SURGERY | Facility: CLINIC | Age: 85
End: 2025-06-08

## 2025-06-08 VITALS
WEIGHT: 155 LBS | HEART RATE: 58 BPM | TEMPERATURE: 97 F | RESPIRATION RATE: 16 BRPM | OXYGEN SATURATION: 98 % | SYSTOLIC BLOOD PRESSURE: 162 MMHG | BODY MASS INDEX: 24 KG/M2 | DIASTOLIC BLOOD PRESSURE: 55 MMHG

## 2025-06-08 DIAGNOSIS — N20.0 NEPHROLITHIASIS: Primary | ICD-10-CM

## 2025-06-08 PROBLEM — N23 URETERAL COLIC: Status: ACTIVE | Noted: 2025-06-08

## 2025-06-08 LAB
GLUCOSE BLDC GLUCOMTR-MCNC: 106 MG/DL (ref 70–99)
GLUCOSE BLDC GLUCOMTR-MCNC: 110 MG/DL (ref 70–99)
GLUCOSE BLDC GLUCOMTR-MCNC: 111 MG/DL (ref 70–99)

## 2025-06-08 PROCEDURE — 99222 1ST HOSP IP/OBS MODERATE 55: CPT | Performed by: INTERNAL MEDICINE

## 2025-06-08 PROCEDURE — 99223 1ST HOSP IP/OBS HIGH 75: CPT | Performed by: UROLOGY

## 2025-06-08 RX ORDER — METOPROLOL SUCCINATE 25 MG/1
25 TABLET, EXTENDED RELEASE ORAL DAILY
Status: DISCONTINUED | OUTPATIENT
Start: 2025-06-08 | End: 2025-06-08

## 2025-06-08 RX ORDER — ACETAMINOPHEN 325 MG/1
650 TABLET ORAL EVERY 4 HOURS PRN
Status: DISCONTINUED | OUTPATIENT
Start: 2025-06-08 | End: 2025-06-08

## 2025-06-08 RX ORDER — SULFAMETHOXAZOLE AND TRIMETHOPRIM 800; 160 MG/1; MG/1
1 TABLET ORAL 2 TIMES DAILY
Qty: 10 TABLET | Refills: 0 | Status: SHIPPED | OUTPATIENT
Start: 2025-06-08 | End: 2025-06-13

## 2025-06-08 RX ORDER — MEMANTINE HYDROCHLORIDE 5 MG/1
5 TABLET ORAL DAILY
Status: DISCONTINUED | OUTPATIENT
Start: 2025-06-08 | End: 2025-06-08

## 2025-06-08 RX ORDER — SULFAMETHOXAZOLE AND TRIMETHOPRIM 800; 160 MG/1; MG/1
1 TABLET ORAL 2 TIMES DAILY
Qty: 10 TABLET | Refills: 0
Start: 2025-06-08 | End: 2025-06-13

## 2025-06-08 RX ORDER — MORPHINE SULFATE 2 MG/ML
1 INJECTION, SOLUTION INTRAMUSCULAR; INTRAVENOUS EVERY 2 HOUR PRN
Status: DISCONTINUED | OUTPATIENT
Start: 2025-06-08 | End: 2025-06-08

## 2025-06-08 RX ORDER — LOSARTAN POTASSIUM 100 MG/1
100 TABLET ORAL DAILY
Status: DISCONTINUED | OUTPATIENT
Start: 2025-06-08 | End: 2025-06-08

## 2025-06-08 RX ORDER — NICOTINE POLACRILEX 4 MG
15 LOZENGE BUCCAL
Status: DISCONTINUED | OUTPATIENT
Start: 2025-06-08 | End: 2025-06-08

## 2025-06-08 RX ORDER — TAMSULOSIN HYDROCHLORIDE 0.4 MG/1
0.4 CAPSULE ORAL DAILY
Qty: 30 CAPSULE | Refills: 0
Start: 2025-06-08 | End: 2025-07-08

## 2025-06-08 RX ORDER — ONDANSETRON 2 MG/ML
4 INJECTION INTRAMUSCULAR; INTRAVENOUS EVERY 6 HOURS PRN
Status: DISCONTINUED | OUTPATIENT
Start: 2025-06-08 | End: 2025-06-08

## 2025-06-08 RX ORDER — HYDROCODONE BITARTRATE AND ACETAMINOPHEN 5; 325 MG/1; MG/1
1 TABLET ORAL EVERY 4 HOURS PRN
Status: DISCONTINUED | OUTPATIENT
Start: 2025-06-08 | End: 2025-06-08

## 2025-06-08 RX ORDER — TAMSULOSIN HYDROCHLORIDE 0.4 MG/1
0.4 CAPSULE ORAL ONCE
Status: DISCONTINUED | OUTPATIENT
Start: 2025-06-08 | End: 2025-06-08

## 2025-06-08 RX ORDER — FAMOTIDINE 20 MG/1
20 TABLET, FILM COATED ORAL DAILY
Status: DISCONTINUED | OUTPATIENT
Start: 2025-06-08 | End: 2025-06-08

## 2025-06-08 RX ORDER — HYDRALAZINE HYDROCHLORIDE 20 MG/ML
10 INJECTION INTRAMUSCULAR; INTRAVENOUS EVERY 6 HOURS PRN
Status: DISCONTINUED | OUTPATIENT
Start: 2025-06-08 | End: 2025-06-08

## 2025-06-08 RX ORDER — DULOXETIN HYDROCHLORIDE 30 MG/1
30 CAPSULE, DELAYED RELEASE ORAL DAILY
Status: DISCONTINUED | OUTPATIENT
Start: 2025-06-08 | End: 2025-06-08

## 2025-06-08 RX ORDER — MORPHINE SULFATE 2 MG/ML
2 INJECTION, SOLUTION INTRAMUSCULAR; INTRAVENOUS EVERY 2 HOUR PRN
Status: DISCONTINUED | OUTPATIENT
Start: 2025-06-08 | End: 2025-06-08

## 2025-06-08 RX ORDER — NICOTINE POLACRILEX 4 MG
30 LOZENGE BUCCAL
Status: DISCONTINUED | OUTPATIENT
Start: 2025-06-08 | End: 2025-06-08

## 2025-06-08 RX ORDER — SODIUM CHLORIDE 9 MG/ML
INJECTION, SOLUTION INTRAVENOUS CONTINUOUS
Status: DISCONTINUED | OUTPATIENT
Start: 2025-06-08 | End: 2025-06-08

## 2025-06-08 RX ORDER — DEXTROSE MONOHYDRATE 25 G/50ML
50 INJECTION, SOLUTION INTRAVENOUS
Status: DISCONTINUED | OUTPATIENT
Start: 2025-06-08 | End: 2025-06-08

## 2025-06-08 RX ORDER — FINASTERIDE 5 MG/1
5 TABLET, FILM COATED ORAL DAILY
Status: DISCONTINUED | OUTPATIENT
Start: 2025-06-08 | End: 2025-06-08

## 2025-06-08 RX ORDER — ZOLPIDEM TARTRATE 5 MG/1
5 TABLET ORAL NIGHTLY PRN
Status: DISCONTINUED | OUTPATIENT
Start: 2025-06-08 | End: 2025-06-08

## 2025-06-08 RX ORDER — AMLODIPINE BESYLATE 10 MG/1
10 TABLET ORAL DAILY
Status: DISCONTINUED | OUTPATIENT
Start: 2025-06-08 | End: 2025-06-08

## 2025-06-08 RX ORDER — ASPIRIN 81 MG/1
81 TABLET ORAL DAILY
Status: DISCONTINUED | OUTPATIENT
Start: 2025-06-08 | End: 2025-06-08

## 2025-06-08 RX ORDER — MORPHINE SULFATE 4 MG/ML
4 INJECTION, SOLUTION INTRAMUSCULAR; INTRAVENOUS EVERY 2 HOUR PRN
Status: DISCONTINUED | OUTPATIENT
Start: 2025-06-08 | End: 2025-06-08

## 2025-06-08 RX ORDER — DONEPEZIL HYDROCHLORIDE 10 MG/1
10 TABLET, FILM COATED ORAL NIGHTLY
Status: DISCONTINUED | OUTPATIENT
Start: 2025-06-08 | End: 2025-06-08

## 2025-06-08 RX ORDER — CELECOXIB 100 MG/1
200 CAPSULE ORAL DAILY
Status: DISCONTINUED | OUTPATIENT
Start: 2025-06-08 | End: 2025-06-08

## 2025-06-08 RX ORDER — ACETAMINOPHEN 500 MG
500 TABLET ORAL EVERY 4 HOURS PRN
Status: DISCONTINUED | OUTPATIENT
Start: 2025-06-08 | End: 2025-06-08

## 2025-06-08 RX ORDER — HYDROCODONE BITARTRATE AND ACETAMINOPHEN 5; 325 MG/1; MG/1
2 TABLET ORAL EVERY 4 HOURS PRN
Status: DISCONTINUED | OUTPATIENT
Start: 2025-06-08 | End: 2025-06-08

## 2025-06-08 RX ORDER — ATORVASTATIN CALCIUM 20 MG/1
20 TABLET, FILM COATED ORAL DAILY
Status: DISCONTINUED | OUTPATIENT
Start: 2025-06-08 | End: 2025-06-08

## 2025-06-08 RX ORDER — METOCLOPRAMIDE HYDROCHLORIDE 5 MG/ML
5 INJECTION INTRAMUSCULAR; INTRAVENOUS EVERY 8 HOURS PRN
Status: DISCONTINUED | OUTPATIENT
Start: 2025-06-08 | End: 2025-06-08

## 2025-06-08 RX ORDER — TAMSULOSIN HYDROCHLORIDE 0.4 MG/1
0.4 CAPSULE ORAL DAILY
Status: DISCONTINUED | OUTPATIENT
Start: 2025-06-09 | End: 2025-06-08

## 2025-06-08 NOTE — PLAN OF CARE
Problem: Patient Centered Care  Goal: Patient preferences are identified and integrated in the patient's plan of care  Description: Interventions:  - Provide timely, complete, and accurate information to patient/family  - Incorporate patient and family knowledge, values, beliefs, and cultural backgrounds into the planning and delivery of care  - Encourage patient/family to participate in care and decision-making at the level they choose  - Honor patient and family perspectives and choices  Outcome: Progressing     Problem: Diabetes/Glucose Control  Goal: Glucose maintained within prescribed range  Description: INTERVENTIONS:  - Monitor Blood Glucose as ordered  - Assess for signs and symptoms of hyperglycemia and hypoglycemia  - Administer ordered medications to maintain glucose within target range  - Assess barriers to adequate nutritional intake and initiate nutrition consult as needed  - Instruct patient on self management of diabetes  Outcome: Progressing     Problem: PAIN - ADULT  Goal: Verbalizes/displays adequate comfort level or patient's stated pain goal  Description: INTERVENTIONS:  - Encourage pt to monitor pain and request assistance  - Assess pain using appropriate pain scale  - Administer analgesics based on type and severity of pain and evaluate response  - Implement non-pharmacological measures as appropriate and evaluate response  - Consider cultural and social influences on pain and pain management  - Manage/alleviate anxiety  - Utilize distraction and/or relaxation techniques  - Monitor for opioid side effects  - Notify MD/LIP if interventions unsuccessful or patient reports new pain  - Anticipate increased pain with activity and pre-medicate as appropriate  Outcome: Progressing     Problem: RISK FOR INFECTION - ADULT  Goal: Absence of fever/infection during anticipated neutropenic period  Description: INTERVENTIONS  - Monitor WBC  - Administer growth factors as ordered  - Implement neutropenic  guidelines  Outcome: Progressing     Problem: SAFETY ADULT - FALL  Goal: Free from fall injury  Description: INTERVENTIONS:  - Assess pt frequently for physical needs  - Identify cognitive and physical deficits and behaviors that affect risk of falls.  - [place] fall precautions as indicated by assessment.  - Educate pt/family on patient safety including physical limitations  - Instruct pt to call for assistance with activity based on assessment  - Modify environment to reduce risk of injury  - Provide assistive devices as appropriate  - Consider OT/PT consult to assist with strengthening/mobility  - Encourage toileting schedule  Outcome: Progressing     Problem: DISCHARGE PLANNING  Goal: Discharge to home or other facility with appropriate resources  Description: INTERVENTIONS:  - Identify barriers to discharge w/pt and caregiver  - Include patient/family/discharge partner in discharge planning  - Arrange for needed discharge resources and transportation as appropriate  - Identify discharge learning needs (meds, wound care, etc)  - Arrange for interpreters to assist at discharge as needed  - Consider post-discharge preferences of patient/family/discharge partner  - Complete POLST form as appropriate  - Assess patient's ability to be responsible for managing their own health  - Refer to Case Management Department for coordinating discharge planning if the patient needs post-hospital services based on physician/LIP order or complex needs related to functional status, cognitive ability or social support system  Outcome: Progressing     Problem: GASTROINTESTINAL - ADULT  Goal: Maintains adequate nutritional intake (undernourished)  Description: INTERVENTIONS:  - Monitor percentage of each meal consumed  - Identify factors contributing to decreased intake, treat as appropriate  - Assist with meals as needed  - Monitor I&O, WT and lab values  - Obtain nutritional consult as needed  - Optimize oral hygiene and  moisture  - Encourage food from home; allow for food preferences  - Enhance eating environment  Outcome: Progressing

## 2025-06-08 NOTE — ED QUICK NOTES
Patient ambulatory to and from bathroom with steady GAIT @ this time. Urine sample provided and sent to lab @ this time.

## 2025-06-08 NOTE — ED PROVIDER NOTES
Patient Seen in: [facility] Emergency Department        History  Chief Complaint   Patient presents with    Abdomen/Flank Pain     Stated Complaint: kidney stones    Subjective:   83yo/m presents w worsening epigastric/flank pain. Saw Dr. Macias this week and had elevated lipase on serum labs and went for CT demonstrating bilateral stones. Went home, worsening pain overnight today. No fever. No vomiting. No chest pain. Nothing makes better. Took a sleeping pill tonight, woke up from the pain. No cough or congestion. No dysuria. No hematuria.                       Objective:     Past Medical History:    Allergic rhinitis    Anxiety    Arthritis    Cataract of both eyes    Cholelithiasis    Depression    Elevated PSA    Hemorrhoids    High triglycerides    IBS (irritable bowel syndrome)    No diabetic retinopathy OU    Osteoarthrosis, unspecified whether generalized or localized, unspecified site    Spinal stenosis    Torn tendon    Right shoulder    Type II or unspecified type diabetes mellitus without mention of complication, not stated as uncontrolled    Unspecified essential hypertension    Vitreous floaters of both eyes              Past Surgical History:   Procedure Laterality Date    Angioplasty (coronary)  01/01/2011    + stent(s)    Appendectomy  1970    Back surgery  2005 ?    Cataract  2023    Cataract extraction w/  intraocular lens implant Right 03/16/2023    Dr. Gilmore done @ [facility]    Colonoscopy  2015 ?                Social History     Socioeconomic History    Marital status:    Tobacco Use    Smoking status: Never    Smokeless tobacco: Never   Vaping Use    Vaping status: Never Used   Substance and Sexual Activity    Alcohol use: Not Currently     Comment: Very occasionally a glass of beer    Drug use: No   Other Topics Concern    Caffeine Concern Yes     Comment: Coffee 1 cup daily    Exercise Yes   Social History Narrative    The patient does not use an assistive device..      The  patient does live in a home with stairs.                                Physical Exam    ED Triage Vitals [06/07/25 7607]   /68   Pulse 71   Resp 19   Temp 97.5 °F (36.4 °C)   Temp src Oral   SpO2 96 %   O2 Device None (Room air)       Current Vitals:   Vital Signs  BP: 153/67  Pulse: 56  Resp: 15  Temp: 97.5 °F (36.4 °C)  Temp src: Oral  MAP (mmHg): 93    Oxygen Therapy  SpO2: 95 %  O2 Device: None (Room air)            Physical Exam  Vitals and nursing note reviewed.   Constitutional:       General: He is not in acute distress.     Appearance: He is well-developed.   HENT:      Head: Normocephalic and atraumatic.      Nose: Nose normal.      Mouth/Throat:      Mouth: Mucous membranes are moist.   Eyes:      Conjunctiva/sclera: Conjunctivae normal.      Pupils: Pupils are equal, round, and reactive to light.   Cardiovascular:      Rate and Rhythm: Normal rate and regular rhythm.      Heart sounds: Normal heart sounds.   Pulmonary:      Effort: Pulmonary effort is normal.      Breath sounds: Normal breath sounds.   Abdominal:      General: Bowel sounds are normal.      Palpations: Abdomen is soft.   Musculoskeletal:         General: No tenderness or deformity. Normal range of motion.      Cervical back: Normal range of motion and neck supple.   Skin:     General: Skin is warm and dry.      Capillary Refill: Capillary refill takes less than 2 seconds.      Findings: No rash.      Comments: Normal color   Neurological:      General: No focal deficit present.      Mental Status: He is alert and oriented to person, place, and time.      GCS: GCS eye subscore is 4. GCS verbal subscore is 5. GCS motor subscore is 6.      Cranial Nerves: No cranial nerve deficit.      Gait: Gait normal.                 ED Course  Labs Reviewed   COMP METABOLIC PANEL (14) - Abnormal; Notable for the following components:       Result Value    Glucose 122 (*)     BUN 27 (*)     BUN/CREA Ratio 23.3 (*)     A/G Ratio 2.1 (*)     All  other components within normal limits   URINALYSIS WITH CULTURE REFLEX - Abnormal; Notable for the following components:    Blood Urine 1+ (*)     WBC Urine 6-10 (*)     RBC Urine >10 (*)     All other components within normal limits   LIPASE - Normal   CBC WITH DIFFERENTIAL WITH PLATELET   URINE CULTURE, ROUTINE          US RIGHT UPPER QUADRANT    Comparison: CT abdomen/pelvis 6/5/2025    IMPRESSION:  Multiple gallstones. No evidence of gallbladder wall thickening or pericholecystic fluid.  Negative sonographic Love's sign.  No biliary dilatation.  Nonobstructing stone in lower pole of right kidney.  1.2 cm right renal cyst.  Multiple liver cysts.           CT ABDOMEN/PELVIS (without IV contrast)    Comparison: Gallbladder ultrasound earlier same day and CT abdomen/pelvis with contrast 6/5/2025    IMPRESSION:  4 mm stone in distal right ureter is redemonstrated. Mild right hydroureteronephrosis is slightly increased from prior CT and right perinephric stranding is increased from prior CT.      Other findings are similar to prior allowing for lack of contrast.  Right adrenal lesion is consistent with adenoma.          MDM         Admission disposition: 6/8/2025  1:13 AM           Medical Decision Making  83yo/m w hx and exam as stated; stone to right ureter    No fever  No dyspnea  No leukocytosis  Normal cr  No evidence of uti  Lipase normalized  US non acute  CT w redmonstrated 4mm stone, worsening hydro        Plan  Admit  Urology consult      Amount and/or Complexity of Data Reviewed  Labs:  Decision-making details documented in ED Course.  Radiology:  Decision-making details documented in ED Course.    Risk  OTC drugs.  Prescription drug management.        Disposition and Plan     Clinical Impression:  1. Ureteral colic         Disposition:  Admit  6/8/2025  1:13 am    Follow-up:  No follow-up provider specified.  We recommend that you schedule follow up care with a primary care provider within the next three  months to obtain basic health screening including reassessment of your blood pressure.      Medications Prescribed:  Current Discharge Medication List                Supplementary Documentation:         Hospital Problems       Present on Admission  Date Reviewed: 6/5/2025          ICD-10-CM Noted POA    * (Principal) Ureteral colic N23 6/8/2025 Unknown

## 2025-06-08 NOTE — CONSULTS
UROLOGY CONSULT NOTE    REASON FOR CONSULT: bilateral ureteral stones    HPI: 84M with bilateral ureteral stones since Ct from 6/5 but presented to the ER with right flank pain and was noted to have bilateral ureteral stones.    Tmax 97.5, HR 52, /46, SO2 97%  WBC 9.4, hematocrit 39.6  Cr 1.16  UA negative nitrites, leukocyte esterase, 6-10 WBC, >10 RBCS    Ct with three small \"non obstructing\" stones in the left ureter and a distal right ureteral stone.         PAST MEDICAL HISTORY:  Past Medical History[1]     PAST SURGICAL HISTORY:  Past Surgical History[2]      ALLERGIES:  Allergies[3]      MEDICATIONS:  Current Outpatient Medications   Medication Instructions    amLODIPine (NORVASC) 10 mg, Oral, Daily    aspirin 81 MG Oral Tab EC 1 tablet, Daily    atorvastatin (LIPITOR) 20 mg, Oral, Daily    celecoxib (CELEBREX) 200 mg, Oral, Daily    Cholecalciferol (VITAMIN D3) 25 MCG (1000 UT) Oral Cap 1 tablet, Daily    Coenzyme Q10 (CO Q-10) 200 MG Oral Cap 1 tablet, Daily    cyanocobalamin 2,000 mcg, Oral, Daily    donepezil (ARICEPT) 10 mg, Oral, Nightly    DULoxetine (CYMBALTA) 30 mg, Oral, Daily    dutasteride (AVODART) 0.5 mg, Oral, Daily    famotidine (PEPCID) 20 mg, Oral, 2 times daily    ipratropium 0.06 % Nasal Solution 2 sprays, Nasal, 4 times daily    LORAZEPAM 0.5 MG Oral Tab TAKE 1 TABLET BY MOUTH TWICE A DAY AS NEEDED    losartan (COZAAR) 100 mg, Oral, Daily    memantine (NAMENDA) 5 mg, Oral, Daily    metFORMIN HCl (GLUCOPHAGE) 1,000 mg, Oral, 2 times daily with meals    metoprolol succinate ER (TOPROL XL) 25 mg, Oral, Daily    tamsulosin (FLOMAX) 0.4 mg, Oral, Daily    zolpidem (AMBIEN) 10 mg, Oral, Nightly PRN        FAMILY HISTORY:  Family History[4]     SOCIAL HISTORY:  Short Social Hx on File[5]    PHYSICAL EXAM:  Vitals:    06/08/25 0200 06/08/25 0201 06/08/25 0459 06/08/25 0812   BP: 148/59  151/68 145/46   BP Location: Right arm  Right arm Left arm   Pulse: 53  52 (!) 48   Resp: 16  17    Temp:  97.3 °F (36.3 °C)  97.3 °F (36.3 °C) 97.8 °F (36.6 °C)   TempSrc: Oral  Oral Oral   SpO2: 95%  92% 97%   Weight:  155 lb (70.3 kg)       CONSTITUTIONAL: No apparent distress, cooperative and communicative  NEUROLOGIC: Alert and oriented   HEAD: Normocephalic, atraumatic   EYES: Sclera non-icteric   ENT: Hearing intact, moist mucous membranes   NECK: No obvious goiter or masses   RESPIRATORY: Normal respiratory effort, Nonlabored breathing on room air  SKIN: No evident rashes   ABDOMEN: Soft, nontender, nondistended, no rebound tenderness, no guarding, no masses      REVIEW OF SYSTEMS:    A comprehensive 10-point review of systems was completed.  Pertinent positives and negatives are noted in the the HPI.      LABORATORY DATA:  Lab Results   Component Value Date    WBC 9.4 06/07/2025    HGB 14.0 06/07/2025    HCT 39.6 06/07/2025    .0 06/07/2025    CREATSERUM 1.16 06/07/2025    BUN 27 06/07/2025     06/07/2025    K 4.2 06/07/2025     06/07/2025    CO2 25.0 06/07/2025     06/07/2025    CA 9.6 06/07/2025    ALB 4.4 06/07/2025    ALKPHO 94 06/07/2025    BILT 0.7 06/07/2025    TP 6.5 06/07/2025    AST 29 06/07/2025    ALT 36 06/07/2025    LIP 48 06/07/2025         IMAGING REVIEW:  Bilateral ureteral stone, right in the distal ureter, left mid ureter, no hydronephrosis     OTHER RELEVANT DATA:   none     IMPRESSION: bilateral ureteral stones with no pain currently, stable vital signs, appropriate urine output. Discussed bilateral ureteral stent placement today, but he feels well and would like to hold off for now.    Will continue to monitor and consider repeat CT scan in AM. Recommend IVF, tamsulosin 0.4mg at bedtime, straining all urine, monitoring urine output and if any fevers, chills, pain, decrease in urine output, plan for emergent/urgent stent placement.    Addendum: had a long discussion with patient and his wife. Stated that I recommend bilateral stents given bilateral obstructing  stones. He declined. I stated that if he has a fever or lack of urine output that is a reason to return to the hospital for stent placement. I also offered observation and stent tomorrow. He declined. I offered MET with return precautions and definitive ureteroscopy in an expedited fashion. He opted for discharge today, he refused stents and staying inpatient for observation. He agreed to ureteroscopy in expedited fashion. He understood return precautions. His wife verbalized understanding as well.      PLAN:  Medical expulsive therapy for now  Fevers, decreased UOP, low threshold for stent which patient declined for now  Will set up ureteroscopy on June 19    Thank you for this consult. If you have any questions or concerns, please do not hesitate to contact the urology department.    Sincerely,  Raissa Hughes MD         [1]   Past Medical History:   Allergic rhinitis    Anxiety    Arthritis    Cataract of both eyes    Cholelithiasis    Depression    Elevated PSA    Hemorrhoids    High triglycerides    IBS (irritable bowel syndrome)    No diabetic retinopathy OU    Osteoarthrosis, unspecified whether generalized or localized, unspecified site    Spinal stenosis    Torn tendon    Right shoulder    Type II or unspecified type diabetes mellitus without mention of complication, not stated as uncontrolled    Unspecified essential hypertension    Vitreous floaters of both eyes   [2]   Past Surgical History:  Procedure Laterality Date    Angioplasty (coronary)  01/01/2011    + stent(s)    Appendectomy  1970    Back surgery  2005 ?    Cataract  2023    Cataract extraction w/  intraocular lens implant Right 03/16/2023    Dr. Gilmore done @ [facility]    Colonoscopy  2015 ?   [3]   Allergies  Allergen Reactions    Ace Inhibitors Coughing     Other reaction(s): Cough   [4]   Family History  Problem Relation Age of Onset    Heart Disease Father     Stroke Mother     Pulmonary Disease Brother     Diabetes Brother     Heart Attack  Daughter 55    Glaucoma Neg     Macular degeneration Neg    [5]   Social History  Socioeconomic History    Marital status:    Tobacco Use    Smoking status: Never    Smokeless tobacco: Never   Vaping Use    Vaping status: Never Used   Substance and Sexual Activity    Alcohol use: Not Currently     Comment: Very occasionally a glass of beer    Drug use: No   Other Topics Concern    Caffeine Concern Yes     Comment: Coffee 1 cup daily    Exercise Yes   Social History Narrative    The patient does not use an assistive device..      The patient does live in a home with stairs.     Social Drivers of Health     Food Insecurity: No Food Insecurity (6/8/2025)    NCSS - Food Insecurity     Worried About Running Out of Food in the Last Year: No     Ran Out of Food in the Last Year: No   Transportation Needs: No Transportation Needs (6/8/2025)    NCSS - Transportation     Lack of Transportation: No   Housing Stability: Not At Risk (6/8/2025)    NCSS - Housing/Utilities     Has Housing: Yes     Worried About Losing Housing: No     Unable to Get Utilities: No

## 2025-06-08 NOTE — H&P
[facility]  part of [facility]                                                                                                          History and Physical     Kashif Suero Patient Status:  Emergency    1940 MRN [id number]   Location [facility] EMERGENCY DEPARTMENT Attending Annalise Rahman MD   Hosp Day # 0 PCP Jose Angel Macias MD       Chief Complaint: Abdominal pain    Subjective:    Kashif Suero is a 84 year old male with history of DM, HTN, osteoarthritis, IBS, elevated triglycerides, depression, anxiety and others who presented to the ED for evaluation of right-sided abdominal/flank pain.  Patient with history of chronic abdominal pain.  Saw PCP on 2025.  Had a CT ordered that showed 7 mm and 4 mm calculi in the mid and proximal left ureter respectively.  Also 4 mm calculus in the distal right ureter just above the UVJ. L>R hydronephrosis.  He was referred to outpatient urology.    He presents to the ED today with worsening right sided abdominal pain, onset 7 PM.  Nonradiating.  No associated nausea, vomiting, diarrhea.  No dysuria.  No fever or chills.  No chest pain or shortness of breath.  Labs and vital stable including lipase 48.  UA negative for nitrate and leukocyte esterase but positive for blood.    RUQ US with multiple gallstones.  No sonographic evidence of cholecystitis.  Noted a nonobstructing stone in the lower pole of the right kidney.  CT AP: Redemonstrated distal right ureter 4 mm stone.  Mild right hydronephrosis is slightly increased.  Right perinephric stranding is increased.    Urology consulted.  Recommended starting patient on ampicillin, Rocephin and Flomax with plan for stent in a.m.    History/Other:      Past Medical History:Past Medical History[1]     Past Surgical History: Past Surgical History[2]    Social History:  reports that he has never smoked. He has never used smokeless tobacco. He reports that he does not currently use  alcohol. He reports that he does not use drugs.    Family History: Family History[3]    Allergies: Allergies[4]    Medications:  Medications Ordered Prior to Encounter[5]    Review of Systems:   A comprehensive 14 point review of systems was completed.    Pertinent positives and negatives noted in the HPI.    Objective:     /67   Pulse 56   Temp 97.5 °F (36.4 °C) (Oral)   Resp 15   Wt 155 lb 13.8 oz (70.7 kg)   SpO2 95%   BMI 24.41 kg/m²   General: No acute distress.    HEENT: Normocephalic, atraumatic.  Neck: Supple.  Respiratory: Normal effort.  CTAB  Cardiovascular: S1, S2 regular.  Normal rate.  Murmur present.   Abdomen: Soft, Nondistended.  Right lower quadrant tenderness without rebound or guarding  Neurologic: Alert, oriented x 3.  Nonfocal.  Musculoskeletal: No tenderness or deformity.  Extremities: Trace edema  Psychiatric: Appropriate    Results:      Labs:  Labs Last 24 Hours:   BMP     CBC    Other     Na 137 Cl 106 BUN 27 Glu 122   Hb 14.0   PTT - Procal -   K 4.2 CO2 25.0 Cr 1.16   WBC 9.4 >< .0  INR - CRP -   Renal Lytes Endo    Hct 39.6   Trop - D dim -   eGFR - Ca 9.6 POC Gluc  -    LFT   pBNP - Lactic -   eGFR AA - PO4 - A1c -   AST 29 APk 94 Prot 6.5  LDL -     Mg - TSH -   ALT 36 T maxx 0.7 Alb 4.4          COVID-19 Lab Results    COVID-19  No results found for: \"COVID19\"    Pro-Calcitonin  No results for input(s): \"PCT\" in the last 168 hours.    Cardiac  No results for input(s): \"TROP\", \"PBNP\" in the last 168 hours.    Creatinine Kinase  No results for input(s): \"CK\" in the last 168 hours.    Inflammatory Markers  Recent Labs   Lab 06/02/25  0753   CRP <0.40   SHEA 26*       Imaging: Imaging data reviewed in Epic.    Assessment & Plan:    Obstructing right ureteral stone with hydronephrosis  Ureteral colic/right-sided abdominal pain due to above  - Admit  - N.p.o.  - Pain control  - Empiric/prophylactic antibiotics  - Urology on consult    DM2  - N.p.o.  - Cover with sliding  scale insulin    HTN  Anxiety  Depression  IBS  -Resume home regimen      Quality:  DVT Prophylaxis: SCD  CODE status: Full code  DEJA: 1 to 2 days    Plan of care discussed with patient. Acknowledged understanding and agrees to plan. Also discussed with the ED physician.    Moderate MDM  Time spent on this admission - examining patient, obtaining history, reviewing previous medical records, going over test results/imaging and discussing plan of care. More than 50% of the time was spent in counseling and/or coordination of care related to alcoholic.   All questions answered.     Annalise Rahman MD  6/8/2025                   [1]   Past Medical History:   Allergic rhinitis    Anxiety    Arthritis    Cataract of both eyes    Cholelithiasis    Depression    Elevated PSA    Hemorrhoids    High triglycerides    IBS (irritable bowel syndrome)    No diabetic retinopathy OU    Osteoarthrosis, unspecified whether generalized or localized, unspecified site    Spinal stenosis    Torn tendon    Right shoulder    Type II or unspecified type diabetes mellitus without mention of complication, not stated as uncontrolled    Unspecified essential hypertension    Vitreous floaters of both eyes   [2]   Past Surgical History:  Procedure Laterality Date    Angioplasty (coronary)  01/01/2011    + stent(s)    Appendectomy  1970    Back surgery  2005 ?    Cataract  2023    Cataract extraction w/  intraocular lens implant Right 03/16/2023    Dr. Gilmore done @ [facility]    Colonoscopy  2015 ?   [3]   Family History  Problem Relation Age of Onset    Heart Disease Father     Stroke Mother     Pulmonary Disease Brother     Diabetes Brother     Heart Attack Daughter 55    Glaucoma Neg     Macular degeneration Neg    [4]   Allergies  Allergen Reactions    Ace Inhibitors Coughing     Other reaction(s): Cough   [5]   Current Facility-Administered Medications on File Prior to Encounter   Medication Dose Route Frequency Provider Last Rate Last Admin     [COMPLETED] iopamidol 76% (ISOVUE-370) injection for power injector  80 mL Intravenous ONCE Jose Angel Kelley MD   80 mL at 25 1231    [COMPLETED] regadenoson (Lexiscan) 0.4 mg/5mL injection        0.4 mg at 25 1147     Current Outpatient Medications on File Prior to Encounter   Medication Sig Dispense Refill    CELECOXIB 200 MG Oral Cap TAKE 1 CAPSULE DAILY 90 capsule 3    losartan 100 MG Oral Tab Take 1 tablet (100 mg total) by mouth daily. 90 tablet 3    metoprolol succinate ER 25 MG Oral Tablet 24 Hr Take 1 tablet (25 mg total) by mouth daily. 90 tablet 3    donepezil (ARICEPT) 10 MG Oral Tab Take 1 tablet (10 mg total) by mouth nightly. 90 tablet 3    famotidine 20 MG Oral Tab Take 1 tablet (20 mg total) by mouth 2 (two) times daily. 180 tablet 3    tamsulosin 0.4 MG Oral Cap Take 1 capsule (0.4 mg total) by mouth daily. 90 capsule 3    ZOLPIDEM 10 MG Oral Tab TAKE 1 TABLET BY MOUTH NIGHTLY AS NEEDED FOR SLEEP 90 tablet 1    MEMANTINE 5 MG Oral Tab TAKE 1 TABLET DAILY 90 tablet 1    dutasteride 0.5 MG Oral Cap Take 1 capsule (0.5 mg total) by mouth daily. 90 capsule 3    DULoxetine 30 MG Oral Cap DR Particles Take 1 capsule (30 mg total) by mouth daily. 90 capsule 3    atorvastatin 20 MG Oral Tab Take 1 tablet (20 mg total) by mouth daily. 90 tablet 3    cyanocobalamin 1000 MCG Oral Tab Take 2 tablets (2,000 mcg total) by mouth daily. 180 tablet 1    [] Ferrous Sulfate 325 (65 Fe) MG Oral Tab Take 1 tablet (325 mg total) by mouth every other day. 45 tablet 1    amLODIPine 10 MG Oral Tab Take 1 tablet (10 mg total) by mouth daily. 90 tablet 3    ipratropium 0.06 % Nasal Solution 2 sprays by Nasal route 4 (four) times daily. 60 mL 3    metFORMIN HCl 1000 MG Oral Tab Take 1 tablet (1,000 mg total) by mouth 2 (two) times daily with meals. (Patient taking differently: Take 1 tablet (1,000 mg total) by mouth daily with breakfast.) 180 tablet 3    Cholecalciferol (VITAMIN D3) 25 MCG (1000 UT) Oral  Cap Take 1 tablet by mouth daily. 2000 units daily      LORAZEPAM 0.5 MG Oral Tab TAKE 1 TABLET BY MOUTH TWICE A DAY AS NEEDED 30 tablet 5    Coenzyme Q10 (CO Q-10) 200 MG Oral Cap Take 1 tablet by mouth daily.      aspirin 81 MG Oral Tab EC Take 1 tablet (81 mg total) by mouth daily.

## 2025-06-08 NOTE — ED QUICK NOTES
Orders for admission, patient is aware of plan and ready to go upstairs. Any questions, please call ED RN Maryjane at extension [phone].     Patient Covid vaccination status: Fully vaccinated     COVID Test Ordered in ED: None    COVID Suspicion at Admission: N/A    Running Infusions: Medication Infusions[1] None    Mental Status/LOC at time of transport: A&Ox3-4    Other pertinent information:   CIWA score: N/A   NIH score:  N/A             [1]

## 2025-06-08 NOTE — PLAN OF CARE
Problem: Patient Centered Care  Goal: Patient preferences are identified and integrated in the patient's plan of care  Description: Interventions:  - Provide timely, complete, and accurate information to patient/family  - Incorporate patient and family knowledge, values, beliefs, and cultural backgrounds into the planning and delivery of care  - Encourage patient/family to participate in care and decision-making at the level they choose  - Honor patient and family perspectives and choices  6/8/2025 1613 by Zachariah Banda RN  Outcome: Adequate for Discharge  6/8/2025 1330 by Zachariah Banda RN  Outcome: Adequate for Discharge     Problem: Diabetes/Glucose Control  Goal: Glucose maintained within prescribed range  Description: INTERVENTIONS:  - Monitor Blood Glucose as ordered  - Assess for signs and symptoms of hyperglycemia and hypoglycemia  - Administer ordered medications to maintain glucose within target range  - Assess barriers to adequate nutritional intake and initiate nutrition consult as needed  - Instruct patient on self management of diabetes  6/8/2025 1613 by Zachariah Banda, RN  Outcome: Adequate for Discharge  6/8/2025 1330 by Zachariah Banda RN  Outcome: Adequate for Discharge       Problem: PAIN - ADULT  Goal: Verbalizes/displays adequate comfort level or patient's stated pain goal  Description: INTERVENTIONS:  - Encourage pt to monitor pain and request assistance  - Assess pain using appropriate pain scale  - Administer analgesics based on type and severity of pain and evaluate response  - Implement non-pharmacological measures as appropriate and evaluate response  - Consider cultural and social influences on pain and pain management  - Manage/alleviate anxiety  - Utilize distraction and/or relaxation techniques  - Monitor for opioid side effects  - Notify MD/LIP if interventions unsuccessful or patient reports new pain  - Anticipate increased pain with activity and pre-medicate as  appropriate  6/8/2025 1613 by Zachariah Banda, RN  Outcome: Adequate for Discharge  6/8/2025 1330 by Zachariah Banda RN  Outcome: Adequate for Discharge     Problem: RISK FOR INFECTION - ADULT  Goal: Absence of fever/infection during anticipated neutropenic period  Description: INTERVENTIONS  - Monitor WBC  - Administer growth factors as ordered  - Implement neutropenic guidelines  6/8/2025 1613 by Zachariah Banda, RN  Outcome: Adequate for Discharge  6/8/2025 1330 by Zachariah Banda RN  Outcome: Adequate for Discharge     Problem: SAFETY ADULT - FALL  Goal: Free from fall injury  Description: INTERVENTIONS:  - Assess pt frequently for physical needs  - Identify cognitive and physical deficits and behaviors that affect risk of falls.  - Garber fall precautions as indicated by assessment.  - Educate pt/family on patient safety including physical limitations  - Instruct pt to call for assistance with activity based on assessment  - Modify environment to reduce risk of injury  - Provide assistive devices as appropriate  - Consider OT/PT consult to assist with strengthening/mobility  - Encourage toileting schedule  Outcome: Adequate for Discharge     Problem: DISCHARGE PLANNING  Goal: Discharge to home or other facility with appropriate resources  Description: INTERVENTIONS:  - Identify barriers to discharge w/pt and caregiver  - Include patient/family/discharge partner in discharge planning  - Arrange for needed discharge resources and transportation as appropriate  - Identify discharge learning needs (meds, wound care, etc)  - Arrange for interpreters to assist at discharge as needed  - Consider post-discharge preferences of patient/family/discharge partner  - Complete POLST form as appropriate  - Assess patient's ability to be responsible for managing their own health  - Refer to Case Management Department for coordinating discharge planning if the patient needs post-hospital services based on physician/LIP  order or complex needs related to functional status, cognitive ability or social support system  6/8/2025 1613 by Zachariah Banda, RN  Outcome: Adequate for Discharge  6/8/2025 1330 by Zachariah Banda, RN  Outcome: Adequate for Discharge     Problem: GASTROINTESTINAL - ADULT  Goal: Maintains adequate nutritional intake (undernourished)  Description: INTERVENTIONS:  - Monitor percentage of each meal consumed  - Identify factors contributing to decreased intake, treat as appropriate  - Assist with meals as needed  - Monitor I&O, WT and lab values  - Obtain nutritional consult as needed  - Optimize oral hygiene and moisture  - Encourage food from home; allow for food preferences  - Enhance eating environment  6/8/2025 1613 by Zachariah Banda, RN  Outcome: Adequate for Discharge  6/8/2025 1330 by Zachariah Banda, RN  Outcome: Adequate for Discharge

## 2025-06-08 NOTE — TELEPHONE ENCOUNTER
Hi!  This patient needs a ureteroscopy. Should be booked for 1.5 hours June 19 Needs labs as ordered below.    Thanks!  AR    Urology Surgery Scheduling Request    Location: [facility] OR    Surgeon: BERNARD Hughes MD    Asst. Surgeon: N/A    Diagnosis: Nephrolithiasis    Procedure: Cystoscopy bilateral ureteroscopy, laser lithotripsy, retrograde pyelogram, stent placement     Anesthesia: General     Time Frame: June 19    Time needed: 1.5 hours    Special Equipment: Holmium Laser    On Call to OR: ancef    Admission: Day Surgery    Pre-op Testing: CBC, CMP, PT/INR and Urine Culture     Need Pre-op Clearance: none    Estimated Post Op/Follow Up Appt: tbd.    Raissa Hughes MD

## 2025-06-09 ENCOUNTER — TELEPHONE (OUTPATIENT)
Dept: INTERNAL MEDICINE CLINIC | Facility: CLINIC | Age: 85
End: 2025-06-09

## 2025-06-09 NOTE — TELEPHONE ENCOUNTER
Returned patient's wife, Leta's (Verified HIPAA) voicemail regarding condition update.     Patient is in hospital for kidney stones.   Hospital discontinued Ferrous Sulfate and started him on Sulfamethoxazole-Trimethoprim  CT & Ultrasound of Kidneys was done.Results show stones on both sides. Dr. Hughes wants to put in stents and patient is scheduled for 6/19/25.   Patient will get stool sample in the near future.   Any questions, please call Leta at [phone]

## 2025-06-23 ENCOUNTER — LAB ENCOUNTER (OUTPATIENT)
Dept: LAB | Age: 85
End: 2025-06-23
Attending: UROLOGY
Payer: MEDICARE

## 2025-06-23 DIAGNOSIS — N20.0 NEPHROLITHIASIS: ICD-10-CM

## 2025-06-23 LAB
ANION GAP SERPL CALC-SCNC: 5 MMOL/L (ref 0–18)
BUN BLD-MCNC: 22 MG/DL (ref 9–23)
BUN/CREAT SERPL: 19.8 (ref 10–20)
CALCIUM BLD-MCNC: 9.7 MG/DL (ref 8.7–10.4)
CHLORIDE SERPL-SCNC: 105 MMOL/L (ref 98–112)
CO2 SERPL-SCNC: 28 MMOL/L (ref 21–32)
CREAT BLD-MCNC: 1.11 MG/DL (ref 0.7–1.3)
DEPRECATED RDW RBC AUTO: 41.6 FL (ref 35.1–46.3)
EGFRCR SERPLBLD CKD-EPI 2021: 65 ML/MIN/1.73M2 (ref 60–?)
ERYTHROCYTE [DISTWIDTH] IN BLOOD BY AUTOMATED COUNT: 12.7 % (ref 11–15)
FASTING STATUS PATIENT QL REPORTED: NO
GLUCOSE BLD-MCNC: 212 MG/DL (ref 70–99)
HCT VFR BLD AUTO: 38.7 % (ref 39–53)
HGB BLD-MCNC: 13.4 G/DL (ref 13–17.5)
INR BLD: 1.07 (ref 0.8–1.2)
MCH RBC QN AUTO: 30.9 PG (ref 26–34)
MCHC RBC AUTO-ENTMCNC: 34.6 G/DL (ref 31–37)
MCV RBC AUTO: 89.4 FL (ref 80–100)
OSMOLALITY SERPL CALC.SUM OF ELEC: 296 MOSM/KG (ref 275–295)
PLATELET # BLD AUTO: 150 10(3)UL (ref 150–450)
POTASSIUM SERPL-SCNC: 3.8 MMOL/L (ref 3.5–5.1)
PROTHROMBIN TIME: 14.6 SECONDS (ref 11.6–14.8)
RBC # BLD AUTO: 4.33 X10(6)UL (ref 3.8–5.8)
SODIUM SERPL-SCNC: 138 MMOL/L (ref 136–145)
WBC # BLD AUTO: 7 X10(3) UL (ref 4–11)

## 2025-06-23 PROCEDURE — 80048 BASIC METABOLIC PNL TOTAL CA: CPT

## 2025-06-23 PROCEDURE — 36415 COLL VENOUS BLD VENIPUNCTURE: CPT

## 2025-06-23 PROCEDURE — 85610 PROTHROMBIN TIME: CPT

## 2025-06-23 PROCEDURE — 85027 COMPLETE CBC AUTOMATED: CPT

## 2025-06-23 PROCEDURE — 87086 URINE CULTURE/COLONY COUNT: CPT

## 2025-06-27 ENCOUNTER — HOSPITAL ENCOUNTER (OUTPATIENT)
Dept: CT IMAGING | Facility: HOSPITAL | Age: 85
Discharge: HOME | End: 2025-06-27
Attending: UROLOGY
Payer: MEDICARE

## 2025-06-27 DIAGNOSIS — N20.0 NEPHROLITHIASIS: ICD-10-CM

## 2025-06-27 PROCEDURE — 74176 CT ABD & PELVIS W/O CONTRAST: CPT | Performed by: UROLOGY

## 2025-06-30 ENCOUNTER — TELEPHONE (OUTPATIENT)
Dept: SURGERY | Facility: CLINIC | Age: 85
End: 2025-06-30

## 2025-06-30 NOTE — TELEPHONE ENCOUNTER
I notified Dr. Hughes of wife's message, she says surgery will be cancelled since patient and wife do not wish to proceed with the recommended surgery. Follow up scheduled with Chris HER.

## 2025-06-30 NOTE — TELEPHONE ENCOUNTER
Called patient's wife back, verified name and  of patient. Wife says they don't want to proceed with the surgery unless is 100% necessary because patient doesn't have any symptoms any more. I let wife know I will let Dr. Hughes know of this.

## 2025-06-30 NOTE — TELEPHONE ENCOUNTER
Per wife calling again for ct results and asking if the surgery tomorrow is necessary. Please advise

## 2025-06-30 NOTE — TELEPHONE ENCOUNTER
Per spouse calling for ct results and to confirm if patient will need to go through with the surgery tomorrow. Per wife patient is trying to avoid surgery. Please advise   Patient unable to complete

## 2025-07-01 NOTE — PROGRESS NOTES
[facility]  Part of [facility]  Urology Clinic Note    Today I had the pleasure of seeing Kashif Suero in my clinic. Mr. Suero is a pleasant 85 year old male who I am seeing for follow up. Patient was last seen on 6/8/2025 by Dr. Hughes.    Patient's spouse reported that pain improved; however, they did not use the strainer for all urination and so unknown if patient passed or not. Originally planned surgery cancelled and patient obtained CT to evaluate for possible passed stone.    No symptoms at this time.    CT A/P 6/27/25 showing: Persistent 8 mm calculus at the right ureterovesicular junction. Upstream right hydronephrosis on prior 6/7/2025 CT has, however, improved/nearly completely resolved.   2.  Left ureteral calculi on prior exam are no longer seen. There is no left hydronephrosis.   3.  Stable other nonobstructing bilateral renal calculi. Stable tiny high density right upper pole renal cortical lesion, which probably relates to a benign proteinaceous/hemorrhagic cyst.   4.  Stable well-circumscribed density liver lesions.   5.  Cholelithiasis with   6.  Stable small well-circumscribed right adrenal nodule.   7.  Colonic diverticulosis.   8.  Prostatomegaly.   9.  Coronary and peripheral atherosclerosis.   10.  Stable chronic reticular opacities in the right lung base, which could relate to scarring and/or chronic aspiration.   11.  Lesser incidental findings as above.     6/23/25  Urine culture negative    Cr 1.11    WBC 7.0    Hgb 13.4    #Kidney stones  6/7/25 to ER for right flank pain and noted to have bilateral ureteral stones. CT with 3 small \"non-obstructing\" stones in the left ureter and a distal right ureteral stone. WBC 9.4, hematocrit 39.6, Cr 1.16, ua negative nitrites, leuk esterase, 6-10 WBC, >10RBC; Dr. Hughes recommended bilateral ureteral stents given bilateral obstructing stones. Offered observation and stent, patient declined both. Was offered MET with return  precautions and definitive ureteroscopy in an expedited fashion and he agreed.    #prostate cancer  - As of visit on 4/28/2025 patient is following up on an as-needed basis    #Benign appearing adrenal nodule on MRI of abdomen with features suggestive of adenoma  - As of visit on 4/28/2025 patient is following up on an as-needed basis    PAST MEDICAL HISTORY:  Past Medical History[1]     PAST SURGICAL HISTORY:  Past Surgical History[2]    ALLERGIES:  Allergies[3]      MEDICATIONS:  Current Outpatient Medications   Medication Instructions    amLODIPine (NORVASC) 10 mg, Oral, Daily    aspirin 81 MG Oral Tab EC 1 tablet, Daily    atorvastatin (LIPITOR) 20 mg, Oral, Daily    celecoxib (CELEBREX) 200 mg, Oral, Daily    Cholecalciferol (VITAMIN D3) 25 MCG (1000 UT) Oral Cap 1 tablet, Daily    Coenzyme Q10 (CO Q-10) 200 MG Oral Cap 1 tablet, Daily    donepezil (ARICEPT) 10 mg, Oral, Nightly    DULoxetine (CYMBALTA) 30 mg, Oral, Daily    dutasteride (AVODART) 0.5 mg, Oral, Daily    famotidine (PEPCID) 20 mg, Oral, 2 times daily    ipratropium 0.06 % Nasal Solution 2 sprays, Nasal, 4 times daily    LORAZEPAM 0.5 MG Oral Tab TAKE 1 TABLET BY MOUTH TWICE A DAY AS NEEDED    losartan (COZAAR) 100 mg, Oral, Daily    memantine (NAMENDA) 5 mg, Oral, Daily    metFORMIN HCl (GLUCOPHAGE) 1,000 mg, Oral, 2 times daily with meals    metoprolol succinate ER (TOPROL XL) 25 mg, Oral, Daily    tamsulosin (FLOMAX) 0.4 mg, Oral, Daily    zolpidem (AMBIEN) 10 mg, Oral, Nightly PRN        FAMILY HISTORY:  Family History[4]     SOCIAL HISTORY:  Short Social Hx on File[5]       PHYSICAL EXAMINATION:  There were no vitals taken for this visit.  General: No acute distress, cooperative and communicative, alert and oriented  Skin: Warm and dry  HEENT: Normocephalic, atraumatic, conjunctivae normal, moist mucous membranes, no discharge, no gross neck abnormalities  Respiratory: No respiratory distress. Chest expansion equal bilaterally.  Extremities:  Moves all extremities spontaneously  Psychiatric: Normal behavior, normal affect    REVIEW OF SYSTEMS:  A comprehensive 10-point review of systems was completed.  Pertinent positives and negatives are noted in the the HPI.     LABORATORY DATA:  Urine Culture Results (last three years):  Lab Results   Component Value Date    URINECUL No Growth 2 Days 06/23/2025    URINECUL No Growth at 18-24 hrs. 06/07/2025     See HPI       IMAGING REVIEW:  6/27/25 CT ABDOMEN AND PELVIS  1.  Persistent 8 mm calculus at the right ureterovesicular junction. Upstream right hydronephrosis on prior 6/7/2025 CT has, however, improved/nearly completely resolved.   2.  Left ureteral calculi on prior exam are no longer seen. There is no left hydronephrosis.   3.  Stable other nonobstructing bilateral renal calculi. Stable tiny high density right upper pole renal cortical lesion, which probably relates to a benign proteinaceous/hemorrhagic cyst.   4.  Stable well-circumscribed density liver lesions.   5.  Cholelithiasis with   6.  Stable small well-circumscribed right adrenal nodule.   7.  Colonic diverticulosis.   8.  Prostatomegaly.   9.  Coronary and peripheral atherosclerosis.   10.  Stable chronic reticular opacities in the right lung base, which could relate to scarring and/or chronic aspiration.   11.  Lesser incidental findings as above.     6/7/25 CT ABDOMEN AND PELVIS  KIDNEYS:   Multiple renal calculi are identified bilaterally. At the right ureterovesical junction, there is a 0.4 x 0.4 x 0.5 cm calculus (density of 534 Hounsfield units). There is resultant right-sided hydronephrosis and diffuse right hydroureter.   Moderate right perinephric and periureteric fat stranding is appreciated. Asymmetric right renal edema is manifested.    URINARY BLADDER: Incompletely distended without visible calculus. Mild circumferential bladder wall thickening may relate to underdistention.   ADRENALS:   There is a grossly stable 2.1 cm diameter  right adrenal nodule.   PELVIC NODES: No lymphadenopathy.     PELVIC ORGANS: The prostate is enlarged, measuring 5.5 cm transversely. A prominent median prostatic lobe indents the base of the urinary bladder. Deep pelvic calcifications likely represent phleboliths.   CONCLUSION:   1. An obstructing 0.4-0.5 cm calculus is lodged at the right ureterovesical junction with resultant right-sided hydroureteronephrosis and asymmetric right renal edema.      2. There is moderate right perinephric and periureteric fat stranding, which may relate to obstructive uropathy, but correlation with urinalysis is recommended to exclude superimposed infection of the obstructed collecting system.      3. Additional bilateral nonobstructing renal calculi are evident.      4. Prostatomegaly with probable chronic outlet obstruction.      5. Uncomplicated distal colonic diverticulosis.      6. Cholelithiasis with mild gallbladder distension, but without further CT evidence of acute complication.      7. Stable right adrenal nodule.      8. Chronic right basilar reticular nodular opacity, which could reflect sequela of chronic aspiration.      9. Lesser incidental findings as above.     6/5/2025 CT ABDOMEN AND PELVIS  1. There are 7 mm and 4 mm calculi in the mid and proximal left ureter respectively.  Additional 4 mm calculus in the distal right ureter (just above the ureterovesicular junction).  There is slight upstream left greater than right hydronephrosis, which   suggests that the aforementioned calculi are partially obstructing.  Urology evaluation is recommended and urinalysis correlation is suggested to exclude coexistent ascending urinary tract infection.   2. Other nonobstructing bilateral renal calculi.  Renal scarring.   3. Prostatomegaly. Circumferential urinary bladder wall thickening, which may relate to incomplete distention, chronic partial outlet obstruction from prostate gland enlargement, or cystitis.   4. Colonic  diverticulosis with findings that suggest constipation.   5. Multiple well-circumscribed liver lesions are unchanged and statistically represent benign cysts or hemangiomas.   6. Cholelithiasis.   7. Stable well-circumscribed right adrenal nodule, which statistically represents a benign adenoma.   8. Advanced coronary and peripheral atherosclerosis.   9. Chronic reticulonodular opacities at the right greater left lung bases, which may relate to scarring or sequelae of chronic aspiration.   10. Lesser incidental findings as above.        IMPRESSION:   #Kidney stones    I had an extensive discussion with the patient that since his right kidney stone has still not passed and still appears to be partially obstructing, we should proceed with definitive management of his stone.  Risks of not managing the stone include but are not limited to stone impaction, ureteral scarring, kidney infection, kidney damage, kidney failure, pain.  The patient understands these risks and does not want to take any further management of his stones unless he is experiencing the relevant pain.     PLAN:  #Kidney stone  - Recommend definitive stone management at this time, patient declines despite risks of not managing ureteral stone  -Continue tamsulosin 0.4 mg daily  -Strain all urine  -Patient needs to go to the emergency room if he begins to have flank pain with fever and/or intractable nausea, vomiting, pain  - Repeat CT scan in 3 weeks, if still present, recommend going to procedure at that time  - Patient does refuse all further management at the time of visit despite discussing the risks and benefits    Chris Palomino PA-C    The 21st Century Cures Act makes medical notes available to patients in the interest of transparency.  However, please be advised that this is a medical document.  It is intended as a peer to peer communication.  It is written in medical language and may contain abbreviations or verbiage that are technical and  unfamiliar.  It may appear blunt or direct.  Medical documents are intended to carry relevant information, facts as evident, and the clinical opinion of the practitioner.         [1]   Past Medical History:   Allergic rhinitis    Anxiety    Arthritis    Calculus of kidney    Cataract of both eyes    Cholelithiasis    Depression    Difficult intubation    Needs peadiaric size tube    Elevated PSA    Esophageal reflux    Hearing impaired person, bilateral    doesn't wear hearing aids    Hemorrhoids    High blood pressure    High cholesterol    High triglycerides    IBS (irritable bowel syndrome)    No diabetic retinopathy OU    Osteoarthrosis, unspecified whether generalized or localized, unspecified site    Spinal stenosis    Torn tendon    Right shoulder    Type II or unspecified type diabetes mellitus without mention of complication, not stated as uncontrolled    Unspecified essential hypertension    Visual impairment    reading glasses    Vitreous floaters of both eyes   [2]   Past Surgical History:  Procedure Laterality Date    Angioplasty (coronary)  01/01/2011    + stent(s)    Appendectomy  1970    Back surgery  2005 ?    Cataract Right 2023    Cataract extraction w/  intraocular lens implant Right 03/16/2023    Dr. Gilmore done @ [facility]    Colonoscopy  2015 ?   [3]   Allergies  Allergen Reactions    Ace Inhibitors Coughing     Other reaction(s): Cough   [4]   Family History  Problem Relation Age of Onset    Heart Disease Father     Stroke Mother     Pulmonary Disease Brother     Diabetes Brother     Heart Attack Daughter 55    Glaucoma Neg     Macular degeneration Neg    [5]   Social History  Socioeconomic History    Marital status:    Tobacco Use    Smoking status: Never    Smokeless tobacco: Never   Vaping Use    Vaping status: Never Used   Substance and Sexual Activity    Alcohol use: Not Currently     Comment: Very occasionally a glass of beer    Drug use: No   Other Topics Concern    Caffeine Concern  Yes     Comment: Coffee 1 cup daily    Exercise Yes   Social History Narrative    The patient does not use an assistive device..      The patient does live in a home with stairs.     Social Drivers of Health     Food Insecurity: No Food Insecurity (6/8/2025)    NCSS - Food Insecurity     Worried About Running Out of Food in the Last Year: No     Ran Out of Food in the Last Year: No   Transportation Needs: No Transportation Needs (6/8/2025)    NCSS - Transportation     Lack of Transportation: No   Housing Stability: Not At Risk (6/8/2025)    NCSS - Housing/Utilities     Has Housing: Yes     Worried About Losing Housing: No     Unable to Get Utilities: No

## 2025-07-02 ENCOUNTER — OFFICE VISIT (OUTPATIENT)
Dept: SURGERY | Facility: CLINIC | Age: 85
End: 2025-07-02

## 2025-07-02 DIAGNOSIS — N20.0 NEPHROLITHIASIS: Primary | ICD-10-CM

## 2025-07-02 PROCEDURE — 1111F DSCHRG MED/CURRENT MED MERGE: CPT | Performed by: PHYSICIAN ASSISTANT

## 2025-07-02 PROCEDURE — 1160F RVW MEDS BY RX/DR IN RCRD: CPT | Performed by: PHYSICIAN ASSISTANT

## 2025-07-02 PROCEDURE — 99214 OFFICE O/P EST MOD 30 MIN: CPT | Performed by: PHYSICIAN ASSISTANT

## 2025-07-02 PROCEDURE — 1159F MED LIST DOCD IN RCRD: CPT | Performed by: PHYSICIAN ASSISTANT

## 2025-07-12 NOTE — TELEPHONE ENCOUNTER
Received faxed refill request for below Medication from [facility]   Metformin Tab 1000mg  Qty #180  Take one tablet twice daily with meals    Patient has been reassigned to Dr. Juliette Garcia due to Dr. Macias's practice closing

## 2025-07-24 ENCOUNTER — APPOINTMENT (OUTPATIENT)
Dept: URBAN - METROPOLITAN AREA CLINIC 244 | Age: 85
Setting detail: DERMATOLOGY
End: 2025-07-29

## 2025-07-24 DIAGNOSIS — L82.0 INFLAMED SEBORRHEIC KERATOSIS: ICD-10-CM

## 2025-07-24 DIAGNOSIS — D485 NEOPLASM OF UNCERTAIN BEHAVIOR OF SKIN: ICD-10-CM

## 2025-07-24 PROBLEM — D48.5 NEOPLASM OF UNCERTAIN BEHAVIOR OF SKIN: Status: ACTIVE | Noted: 2025-07-24

## 2025-07-24 PROCEDURE — OTHER BIOPSY BY SHAVE METHOD: OTHER

## 2025-07-24 PROCEDURE — OTHER COUNSELING: OTHER

## 2025-07-24 PROCEDURE — OTHER LIQUID NITROGEN: OTHER

## 2025-07-24 PROCEDURE — 11102 TANGNTL BX SKIN SINGLE LES: CPT | Mod: 59

## 2025-07-24 PROCEDURE — 17110 DESTRUCT B9 LESION 1-14: CPT

## 2025-07-24 ASSESSMENT — LOCATION DETAILED DESCRIPTION DERM
LOCATION DETAILED: LEFT DISTAL POSTERIOR UPPER ARM
LOCATION DETAILED: RIGHT DISTAL POSTERIOR UPPER ARM
LOCATION DETAILED: LEFT PROXIMAL POSTERIOR UPPER ARM
LOCATION DETAILED: RIGHT CENTRAL MALAR CHEEK
LOCATION DETAILED: RIGHT PROXIMAL POSTERIOR UPPER ARM
LOCATION DETAILED: LEFT SUPERIOR PARIETAL SCALP

## 2025-07-24 ASSESSMENT — LOCATION SIMPLE DESCRIPTION DERM
LOCATION SIMPLE: RIGHT UPPER ARM
LOCATION SIMPLE: RIGHT CHEEK
LOCATION SIMPLE: SCALP
LOCATION SIMPLE: LEFT UPPER ARM

## 2025-07-24 ASSESSMENT — LOCATION ZONE DERM
LOCATION ZONE: ARM
LOCATION ZONE: FACE
LOCATION ZONE: SCALP

## 2025-07-30 RX ORDER — ZOLPIDEM TARTRATE 10 MG/1
10 TABLET ORAL NIGHTLY PRN
Qty: 90 TABLET | Refills: 0 | Status: SHIPPED | OUTPATIENT
Start: 2025-07-30

## 2025-07-30 RX ORDER — ZOLPIDEM TARTRATE 10 MG/1
10 TABLET ORAL NIGHTLY PRN
COMMUNITY
Start: 2025-02-03 | End: 2025-07-30 | Stop reason: SDUPTHER

## 2025-07-31 RX ORDER — MEMANTINE HYDROCHLORIDE 5 MG/1
5 TABLET ORAL DAILY
Qty: 90 TABLET | Refills: 0 | Status: SHIPPED | OUTPATIENT
Start: 2025-07-31

## 2025-08-11 ENCOUNTER — APPOINTMENT (OUTPATIENT)
Dept: URBAN - METROPOLITAN AREA CLINIC 244 | Age: 85
Setting detail: DERMATOLOGY
End: 2025-08-11

## 2025-08-11 DIAGNOSIS — L81.4 OTHER MELANIN HYPERPIGMENTATION: ICD-10-CM

## 2025-08-11 DIAGNOSIS — D22 MELANOCYTIC NEVI: ICD-10-CM

## 2025-08-11 DIAGNOSIS — L57.0 ACTINIC KERATOSIS: ICD-10-CM

## 2025-08-11 DIAGNOSIS — Z12.83 ENCOUNTER FOR SCREENING FOR MALIGNANT NEOPLASM OF SKIN: ICD-10-CM

## 2025-08-11 DIAGNOSIS — L82.0 INFLAMED SEBORRHEIC KERATOSIS: ICD-10-CM

## 2025-08-11 DIAGNOSIS — L82.1 OTHER SEBORRHEIC KERATOSIS: ICD-10-CM

## 2025-08-11 DIAGNOSIS — Z85.828 PERSONAL HISTORY OF OTHER MALIGNANT NEOPLASM OF SKIN: ICD-10-CM

## 2025-08-11 PROBLEM — D22.9 MELANOCYTIC NEVI, UNSPECIFIED: Status: ACTIVE | Noted: 2025-08-11

## 2025-08-11 PROBLEM — D04.61 CARCINOMA IN SITU OF SKIN OF RIGHT UPPER LIMB, INCLUDING SHOULDER: Status: ACTIVE | Noted: 2025-08-11

## 2025-08-11 PROCEDURE — 17000 DESTRUCT PREMALG LESION: CPT | Mod: 59

## 2025-08-11 PROCEDURE — 17262 DSTRJ MAL LES T/A/L 1.1-2.0: CPT

## 2025-08-11 PROCEDURE — 17003 DESTRUCT PREMALG LES 2-14: CPT

## 2025-08-11 PROCEDURE — OTHER LIQUID NITROGEN: OTHER

## 2025-08-11 PROCEDURE — OTHER DIAGNOSIS COMMENT: OTHER

## 2025-08-11 PROCEDURE — OTHER OTC TREATMENT REGIMEN: OTHER

## 2025-08-11 PROCEDURE — OTHER CURETTAGE AND DESTRUCTION: OTHER

## 2025-08-11 PROCEDURE — 99213 OFFICE O/P EST LOW 20 MIN: CPT | Mod: 25

## 2025-08-11 PROCEDURE — OTHER COUNSELING: OTHER

## 2025-08-11 ASSESSMENT — LOCATION SIMPLE DESCRIPTION DERM
LOCATION SIMPLE: RIGHT LIP
LOCATION SIMPLE: RIGHT CHEEK
LOCATION SIMPLE: POSTERIOR SCALP
LOCATION SIMPLE: LEFT UPPER ARM
LOCATION SIMPLE: LEFT EYEBROW
LOCATION SIMPLE: RIGHT HAND

## 2025-08-11 ASSESSMENT — LOCATION DETAILED DESCRIPTION DERM
LOCATION DETAILED: LEFT PROXIMAL POSTERIOR UPPER ARM
LOCATION DETAILED: RIGHT CENTRAL MALAR CHEEK
LOCATION DETAILED: RIGHT ULNAR DORSAL HAND
LOCATION DETAILED: LEFT LATERAL EYEBROW
LOCATION DETAILED: RIGHT UPPER CUTANEOUS LIP
LOCATION DETAILED: POSTERIOR MID-PARIETAL SCALP

## 2025-08-11 ASSESSMENT — LOCATION ZONE DERM
LOCATION ZONE: LIP
LOCATION ZONE: FACE
LOCATION ZONE: SCALP
LOCATION ZONE: HAND
LOCATION ZONE: ARM

## 2025-08-21 RX ORDER — METOPROLOL SUCCINATE 25 MG/1
25 TABLET, EXTENDED RELEASE ORAL DAILY
Qty: 90 TABLET | Refills: 3 | Status: SHIPPED | OUTPATIENT
Start: 2025-08-21

## 2025-09-05 ENCOUNTER — TELEPHONE (OUTPATIENT)
Dept: INTERNAL MEDICINE | Age: 85
End: 2025-09-05

## 2025-09-15 ENCOUNTER — APPOINTMENT (OUTPATIENT)
Dept: INTERNAL MEDICINE | Age: 85
End: 2025-09-15

## (undated) DIAGNOSIS — E78.1 HYPERTRIGLYCERIDEMIA: ICD-10-CM

## (undated) DIAGNOSIS — I10 ESSENTIAL HYPERTENSION: Primary | ICD-10-CM

## (undated) DIAGNOSIS — E78.00 HYPERCHOLESTEROLEMIA: ICD-10-CM

## (undated) DIAGNOSIS — E11.9 TYPE 2 DIABETES MELLITUS WITHOUT COMPLICATION, WITHOUT LONG-TERM CURRENT USE OF INSULIN (HCC): ICD-10-CM

## (undated) NOTE — MR AVS SNAPSHOT
[place] [address] [facility] [place] [id number] [phone]               Thank you for choosing us for your health care visit with Jim Rocha MD.  We are glad to serve you and happy to provide you with this summary of your visit.   Pl ASHD (arteriosclerotic heart disease)    Benign non-nodular prostatic hyperplasia with lower urinary tract symptoms    DM (diabetes mellitus)    Elevated PSA    Fatigue    Gastroesophageal reflux disease    Hypercholesteremia    Hypertension    Hypertrigl METAMUCIL 0.52 g Caps   Generic drug:  Psyllium   Take 1 packet by mouth as needed. MetFORMIN HCl 1000 MG Tabs   Take 1 tablet (1,000 mg total) by mouth 2 (two) times daily with meals.    Commonly known as:  GLUCOPHAGE           Metoprolol

## (undated) NOTE — LETTER
Ju[place]o Bass, [address], [place] [address]       02/28/20        Patient: Orelia Osgood   YOB: 1940   Date of Visit: 2/28/2020       Dear  Dr. Abel Foote MD,      Thank you for referring Orelia Osgood to my p

## (undated) NOTE — LETTER
[address]   Date:   8/7/2019     Name:   Zara Varela    YOB: 1940   MRN:   [id number]       WHERE IS YOUR PAIN NOW? Ceasar the areas on your body where you feel the described sensations.   Use the appropriate sy

## (undated) NOTE — MR AVS SNAPSHOT
[place]  [address]  [phone]               Thank you for choosing us for your health care visit with Jose Infante MD.  We are glad to serve you and happy to provide you with this summa This list is accurate as of: 5/18/17  3:22 PM.  Always use your most recent med list.                AmLODIPine Besylate 10 MG Tabs   TAKE 1 TABLET DAILY   Commonly known as:  NORVASC           ASPIRIN ADULT LOW STRENGTH 81 MG Tbec   Generic drug:

## (undated) NOTE — LETTER
August 12, 2021    [facility] [id number]     Patient: Sravan Magaña   YOB: 1940   Date of Visit: 8/12/2021       Dear Dr. Jose Angel Juan MD:    Thank you for referring Sundar Hu to me for eval Vaping Use: Never used    Alcohol use: Yes      Comment: Occasionally    Drug use: No      Medications:  Current Outpatient Medications   Medication Sig Dispense Refill   • IPRATROPIUM BROMIDE 0.06 % Nasal Solution USE 2 SPRAYS NASALLY 4     TIMES DAILY 30 Linear)       Right Left    Dist cc 20/40 20/25    Near cc 20/25 20/20   DVA checked with +1.25 in phoropter  NVA checked with +3.00 in phoropter           Tonometry ([facility], 11:12 AM)       Right Left    Pressure 16 16          Pupils       Pupils    Right in detail with patient. Floater, vitreous   There is no evidence of retinal pathology. All signs and symptoms of retinal detachment/tears explained in detail.     Patient instructed to call the office if they experience increase in floaters, increase in

## (undated) NOTE — LETTER
Hospital Discharge Documentation  Please phone to schedule a hospital follow up appointment.    From: [facility] Hospitalist's Office  Phone: [phone]    Patient discharged time/date: 6/8/2025  5:16 PM  Patient discharge disposition:  Home or Self Care

## (undated) NOTE — Clinical Note
May 18, 2017    Charmayne Applebaum, MD   [address]     Patient: Zara Varela   YOB: 1940   Date of Visit: 5/18/2017       Dear Dr. Lyla Ram MD:    Thank you for referring Brayan Vela to me for evaluation Dutasteride (AVODART) 0.5 MG Oral Cap Take 1 capsule (0.5 mg total) by mouth daily. Disp: 90 capsule Rfl: 3   tamsulosin HCl 0.4 MG Oral Cap Take 1 capsule (0.4 mg total) by mouth daily.  Disp: 90 capsule Rfl: 3   PANTOPRAZOLE SODIUM 40 MG Oral Tab EC TAKE Left PERRL         Visual Fields      Left Right   Result Full Full         Extraocular Movement      Right Left   Result Full, Ortho Full, Ortho         Dilation     Both eyes:  1.0% Mydriacyl and 2.5% Fredy Synephrine @ 2:24 PM            Slit Lamp and Fun No prescriptions requested or ordered in this encounter     Follow up instructions:  Return in about 1 year (around 5/18/2018) for Diabetic eye exam.    5/18/2017  Scribed by: Kade Liu MD        If you have questions, please do not hesitate to call

## (undated) NOTE — LETTER
[address], [place]  Authorization for Invasive Procedures  1.  I hereby authorize  Dr Mary Chinchilla , my physician and whomever may be designated as the doctor's assistant, to perform the following operation and/or procedure: Cardiac Cath procedures to be performed for the purposes of advancing medicine, science, and/or education, provided my identity is not revealed. If the procedure has been videotaped, the physician/surgeon will obtain the original videotape.  The hospital will not be res signature below affirms that prior to the time of the procedure, I have explained to the patient and/or his legal representative, the risks and benefits involved in the proposed treatment and any reasonable alternative to the proposed treatment.  I have als

## (undated) NOTE — MR AVS SNAPSHOT
[place]  [facility] [address] [address]  [phone]               Thank you for choosing us for your health care visit with Inderjit Castillo MD.  We are glad to serve you and happy to provide you with this summary of your visit.   Pl med list.                AmLODIPine Besylate 10 MG Tabs   TAKE 1 TABLET DAILY   Commonly known as:  NORVASC           ASPIRIN ADULT LOW STRENGTH 81 MG Tbec   Generic drug:  aspirin   Take 1 tablet by mouth daily.            AVODART 0.5 MG Caps   Generic dahiana - azithromycin 250 MG Tabs            MyChart               Educational Information     Healthy Diet and Regular Exercise  [employer] [facility] [employer] [facility] for making healthy food choices  -   Enjoy your food, but eat less.   Fully enjoy your food whe

## (undated) NOTE — MR AVS SNAPSHOT
[place]  [facility] [address] [address]  [phone]               Thank you for choosing us for your health care visit with Marbella Madison MD.  We are glad to serve you and happy to provide you with this summary of your visit.   Pl Benign non-nodular prostatic hyperplasia with lower urinary tract symptoms    DM (diabetes mellitus)    Elevated PSA    Fatigue    Gastroesophageal reflux disease    Hypercholesteremia    Hypertension    Hypertriglyceridemia    Irritable bowel syndrome wi Take 1 tablet (100 mg total) by mouth once daily. Commonly known as:  COZAAR           MetFORMIN HCl 1000 MG Tabs   Take 1 tablet (1,000 mg total) by mouth 2 (two) times daily with meals.    Commonly known as:  GLUCOPHAGE           Metoprolol Succinate ER